# Patient Record
Sex: MALE | Race: WHITE | NOT HISPANIC OR LATINO | Employment: OTHER | URBAN - METROPOLITAN AREA
[De-identification: names, ages, dates, MRNs, and addresses within clinical notes are randomized per-mention and may not be internally consistent; named-entity substitution may affect disease eponyms.]

---

## 2018-02-13 ENCOUNTER — ANESTHESIA EVENT (OUTPATIENT)
Dept: SURGERY | Facility: CLINIC | Age: 68
DRG: 219 | End: 2018-02-13
Payer: MEDICARE

## 2018-02-13 ENCOUNTER — APPOINTMENT (OUTPATIENT)
Dept: CT IMAGING | Facility: CLINIC | Age: 68
DRG: 219 | End: 2018-02-13
Attending: EMERGENCY MEDICINE
Payer: MEDICARE

## 2018-02-13 ENCOUNTER — HOSPITAL ENCOUNTER (INPATIENT)
Facility: CLINIC | Age: 68
LOS: 6 days | Discharge: HOME | DRG: 219 | End: 2018-02-19
Attending: EMERGENCY MEDICINE | Admitting: INTERNAL MEDICINE
Payer: MEDICARE

## 2018-02-13 ENCOUNTER — APPOINTMENT (OUTPATIENT)
Dept: CARDIOLOGY | Facility: CLINIC | Age: 68
DRG: 219 | End: 2018-02-13
Attending: INTERNAL MEDICINE
Payer: MEDICARE

## 2018-02-13 ENCOUNTER — APPOINTMENT (OUTPATIENT)
Dept: GENERAL RADIOLOGY | Facility: CLINIC | Age: 68
DRG: 219 | End: 2018-02-13
Attending: PHYSICIAN ASSISTANT
Payer: MEDICARE

## 2018-02-13 ENCOUNTER — ANESTHESIA (OUTPATIENT)
Dept: SURGERY | Facility: CLINIC | Age: 68
DRG: 219 | End: 2018-02-13
Payer: MEDICARE

## 2018-02-13 DIAGNOSIS — I71.21 THORACIC ASCENDING AORTIC ANEURYSM (H): ICD-10-CM

## 2018-02-13 DIAGNOSIS — I71.019 DISSECTION OF THORACIC AORTA (H): Primary | ICD-10-CM

## 2018-02-13 DIAGNOSIS — Z98.890 S/P AORTIC DISSECTION REPAIR: ICD-10-CM

## 2018-02-13 DIAGNOSIS — R57.0 CARDIOGENIC SHOCK (H): ICD-10-CM

## 2018-02-13 DIAGNOSIS — I31.39 PERICARDIAL EFFUSION: ICD-10-CM

## 2018-02-13 LAB
ABO + RH BLD: NORMAL
ABO + RH BLD: NORMAL
ALBUMIN SERPL-MCNC: 4 G/DL (ref 3.4–5)
ALP SERPL-CCNC: 70 U/L (ref 40–150)
ALT SERPL W P-5'-P-CCNC: 47 U/L (ref 0–70)
ANION GAP SERPL CALCULATED.3IONS-SCNC: 12 MMOL/L (ref 3–14)
ANION GAP SERPL CALCULATED.3IONS-SCNC: 13 MMOL/L (ref 3–14)
ANION GAP SERPL CALCULATED.3IONS-SCNC: 15 MMOL/L (ref 3–14)
ANION GAP SERPL CALCULATED.3IONS-SCNC: 9 MMOL/L (ref 3–14)
APTT PPP: 27 SEC (ref 22–37)
APTT PPP: 60 SEC (ref 22–37)
APTT PPP: 67 SEC (ref 22–37)
APTT PPP: 86 SEC (ref 22–37)
AST SERPL W P-5'-P-CCNC: 35 U/L (ref 0–45)
BASE DEFICIT BLDA-SCNC: 3.6 MMOL/L
BASOPHILS # BLD AUTO: 0 10E9/L (ref 0–0.2)
BASOPHILS # BLD AUTO: 0 10E9/L (ref 0–0.2)
BASOPHILS NFR BLD AUTO: 0.1 %
BASOPHILS NFR BLD AUTO: 0.3 %
BILIRUB SERPL-MCNC: 0.8 MG/DL (ref 0.2–1.3)
BLD GP AB SCN SERPL QL: NORMAL
BLD PROD TYP BPU: NORMAL
BLD UNIT ID BPU: 0
BLOOD BANK CMNT PATIENT-IMP: NORMAL
BLOOD PRODUCT CODE: NORMAL
BPU ID: NORMAL
BUN SERPL-MCNC: 20 MG/DL (ref 7–30)
BUN SERPL-MCNC: 20 MG/DL (ref 7–30)
BUN SERPL-MCNC: 21 MG/DL (ref 7–30)
BUN SERPL-MCNC: 21 MG/DL (ref 7–30)
CA-I BLD-SCNC: 2.4 MG/DL (ref 4.4–5.2)
CA-I BLD-SCNC: 4.7 MG/DL (ref 4.4–5.2)
CALCIUM SERPL-MCNC: 7.2 MG/DL (ref 8.5–10.1)
CALCIUM SERPL-MCNC: 7.2 MG/DL (ref 8.5–10.1)
CALCIUM SERPL-MCNC: 7.6 MG/DL (ref 8.5–10.1)
CALCIUM SERPL-MCNC: 8.5 MG/DL (ref 8.5–10.1)
CHLORIDE SERPL-SCNC: 106 MMOL/L (ref 94–109)
CHLORIDE SERPL-SCNC: 110 MMOL/L (ref 94–109)
CHLORIDE SERPL-SCNC: 112 MMOL/L (ref 94–109)
CHLORIDE SERPL-SCNC: 112 MMOL/L (ref 94–109)
CO2 BLD-SCNC: 20 MMOL/L (ref 21–28)
CO2 BLD-SCNC: 20 MMOL/L (ref 21–28)
CO2 BLD-SCNC: 21 MMOL/L (ref 21–28)
CO2 BLD-SCNC: 22 MMOL/L (ref 21–28)
CO2 BLD-SCNC: 22 MMOL/L (ref 21–28)
CO2 BLD-SCNC: 23 MMOL/L (ref 21–28)
CO2 BLD-SCNC: 25 MMOL/L (ref 21–28)
CO2 BLDCOV-SCNC: 23 MMOL/L (ref 21–28)
CO2 SERPL-SCNC: 20 MMOL/L (ref 20–32)
CO2 SERPL-SCNC: 21 MMOL/L (ref 20–32)
CO2 SERPL-SCNC: 21 MMOL/L (ref 20–32)
CO2 SERPL-SCNC: 26 MMOL/L (ref 20–32)
CPB APPLIED: ABNORMAL
CREAT SERPL-MCNC: 1.35 MG/DL (ref 0.66–1.25)
CREAT SERPL-MCNC: 1.39 MG/DL (ref 0.66–1.25)
CREAT SERPL-MCNC: 1.4 MG/DL (ref 0.66–1.25)
CREAT SERPL-MCNC: 1.47 MG/DL (ref 0.66–1.25)
D DIMER PPP FEU-MCNC: 0.4 UG/ML FEU (ref 0–0.5)
DIFFERENTIAL METHOD BLD: ABNORMAL
DIFFERENTIAL METHOD BLD: ABNORMAL
EOSINOPHIL # BLD AUTO: 0 10E9/L (ref 0–0.7)
EOSINOPHIL # BLD AUTO: 0.1 10E9/L (ref 0–0.7)
EOSINOPHIL NFR BLD AUTO: 0.2 %
EOSINOPHIL NFR BLD AUTO: 0.8 %
ERYTHROCYTE [DISTWIDTH] IN BLOOD BY AUTOMATED COUNT: 13.9 % (ref 10–15)
ERYTHROCYTE [DISTWIDTH] IN BLOOD BY AUTOMATED COUNT: 14 % (ref 10–15)
ERYTHROCYTE [DISTWIDTH] IN BLOOD BY AUTOMATED COUNT: 14.2 % (ref 10–15)
ERYTHROCYTE [DISTWIDTH] IN BLOOD BY AUTOMATED COUNT: 14.2 % (ref 10–15)
FIBRINOGEN PPP-MCNC: 185 MG/DL (ref 200–420)
FIBRINOGEN PPP-MCNC: 211 MG/DL (ref 200–420)
GFR SERPL CREATININE-BSD FRML MDRD: 48 ML/MIN/1.7M2
GFR SERPL CREATININE-BSD FRML MDRD: 50 ML/MIN/1.7M2
GFR SERPL CREATININE-BSD FRML MDRD: 51 ML/MIN/1.7M2
GFR SERPL CREATININE-BSD FRML MDRD: 53 ML/MIN/1.7M2
GLUCOSE BLDC GLUCOMTR-MCNC: 122 MG/DL (ref 70–99)
GLUCOSE BLDC GLUCOMTR-MCNC: 167 MG/DL (ref 70–99)
GLUCOSE BLDC GLUCOMTR-MCNC: 174 MG/DL (ref 70–99)
GLUCOSE SERPL-MCNC: 140 MG/DL (ref 70–99)
GLUCOSE SERPL-MCNC: 178 MG/DL (ref 70–99)
GLUCOSE SERPL-MCNC: 185 MG/DL (ref 70–99)
GLUCOSE SERPL-MCNC: 209 MG/DL (ref 70–99)
HBA1C MFR BLD: 5.5 % (ref 4.3–6)
HCO3 BLD-SCNC: 21 MMOL/L (ref 21–28)
HCO3 BLDV-SCNC: 23 MMOL/L (ref 21–28)
HCT VFR BLD AUTO: 27.1 % (ref 40–53)
HCT VFR BLD AUTO: 29.8 % (ref 40–53)
HCT VFR BLD AUTO: 31.7 % (ref 40–53)
HCT VFR BLD AUTO: 43.3 % (ref 40–53)
HCT VFR BLD CALC: 22 %PCV (ref 40–53)
HCT VFR BLD CALC: 29 %PCV (ref 40–53)
HCT VFR BLD CALC: 29 %PCV (ref 40–53)
HCT VFR BLD CALC: 32 %PCV (ref 40–53)
HCT VFR BLD CALC: 33 %PCV (ref 40–53)
HCT VFR BLD CALC: 33 %PCV (ref 40–53)
HCT VFR BLD CALC: 34 %PCV (ref 40–53)
HCT VFR BLD CALC: 36 %PCV (ref 40–53)
HCT VFR BLD CALC: 42 %PCV (ref 40–53)
HGB BLD CALC-MCNC: 10.9 G/DL (ref 13.3–17.7)
HGB BLD CALC-MCNC: 11.2 G/DL (ref 13.3–17.7)
HGB BLD CALC-MCNC: 11.2 G/DL (ref 13.3–17.7)
HGB BLD CALC-MCNC: 11.6 G/DL (ref 13.3–17.7)
HGB BLD CALC-MCNC: 12.2 G/DL (ref 13.3–17.7)
HGB BLD CALC-MCNC: 14.3 G/DL (ref 13.3–17.7)
HGB BLD CALC-MCNC: 7.5 G/DL (ref 13.3–17.7)
HGB BLD CALC-MCNC: 9.9 G/DL (ref 13.3–17.7)
HGB BLD CALC-MCNC: 9.9 G/DL (ref 13.3–17.7)
HGB BLD-MCNC: 10.1 G/DL (ref 13.3–17.7)
HGB BLD-MCNC: 10.7 G/DL (ref 13.3–17.7)
HGB BLD-MCNC: 14.6 G/DL (ref 13.3–17.7)
HGB BLD-MCNC: 9 G/DL (ref 13.3–17.7)
IMM GRANULOCYTES # BLD: 0.1 10E9/L (ref 0–0.4)
IMM GRANULOCYTES # BLD: 0.2 10E9/L (ref 0–0.4)
IMM GRANULOCYTES NFR BLD: 0.5 %
IMM GRANULOCYTES NFR BLD: 1.2 %
INR PPP: 0.82 (ref 0.86–1.14)
INR PPP: 0.86 (ref 0.86–1.14)
INR PPP: 1.04 (ref 0.86–1.14)
INR PPP: 1.65 (ref 0.86–1.14)
INTERPRETATION ECG - MUSE: NORMAL
INTERPRETATION ECG - MUSE: NORMAL
LACTATE BLD-SCNC: 1.3 MMOL/L (ref 0.7–2.1)
LACTATE BLD-SCNC: 1.5 MMOL/L (ref 0.7–2.1)
LACTATE BLD-SCNC: 1.6 MMOL/L (ref 0.7–2.1)
LACTATE BLD-SCNC: 1.6 MMOL/L (ref 0.7–2.1)
LACTATE BLD-SCNC: 1.7 MMOL/L (ref 0.7–2.1)
LACTATE BLD-SCNC: 2 MMOL/L (ref 0.7–2.1)
LACTATE BLD-SCNC: 2.3 MMOL/L (ref 0.7–2)
LACTATE BLD-SCNC: 2.6 MMOL/L (ref 0.7–2)
LACTATE BLD-SCNC: 4.1 MMOL/L (ref 0.7–2)
LACTATE BLD-SCNC: NORMAL MMOL/L (ref 0.7–2)
LYMPHOCYTES # BLD AUTO: 1.3 10E9/L (ref 0.8–5.3)
LYMPHOCYTES # BLD AUTO: 1.4 10E9/L (ref 0.8–5.3)
LYMPHOCYTES NFR BLD AUTO: 11.2 %
LYMPHOCYTES NFR BLD AUTO: 7.8 %
MCH RBC QN AUTO: 30.4 PG (ref 26.5–33)
MCH RBC QN AUTO: 30.8 PG (ref 26.5–33)
MCH RBC QN AUTO: 30.9 PG (ref 26.5–33)
MCH RBC QN AUTO: 31 PG (ref 26.5–33)
MCHC RBC AUTO-ENTMCNC: 33.2 G/DL (ref 31.5–36.5)
MCHC RBC AUTO-ENTMCNC: 33.7 G/DL (ref 31.5–36.5)
MCHC RBC AUTO-ENTMCNC: 33.8 G/DL (ref 31.5–36.5)
MCHC RBC AUTO-ENTMCNC: 33.9 G/DL (ref 31.5–36.5)
MCV RBC AUTO: 91 FL (ref 78–100)
MCV RBC AUTO: 92 FL (ref 78–100)
MONOCYTES # BLD AUTO: 0.6 10E9/L (ref 0–1.3)
MONOCYTES # BLD AUTO: 1.2 10E9/L (ref 0–1.3)
MONOCYTES NFR BLD AUTO: 4.8 %
MONOCYTES NFR BLD AUTO: 6.9 %
NEUTROPHILS # BLD AUTO: 14.9 10E9/L (ref 1.6–8.3)
NEUTROPHILS # BLD AUTO: 9.8 10E9/L (ref 1.6–8.3)
NEUTROPHILS NFR BLD AUTO: 82.4 %
NEUTROPHILS NFR BLD AUTO: 83.8 %
NRBC # BLD AUTO: 0 10*3/UL
NRBC # BLD AUTO: 0 10*3/UL
NRBC BLD AUTO-RTO: 0 /100
NRBC BLD AUTO-RTO: 0 /100
NT-PROBNP SERPL-MCNC: 350 PG/ML (ref 0–900)
NUM BPU REQUESTED: 6
OXYHGB MFR BLD: 94 % (ref 92–100)
OXYHGB MFR BLDV: 66 %
PCO2 BLD: 36 MM HG (ref 35–45)
PCO2 BLD: 40 MM HG (ref 35–45)
PCO2 BLD: 42 MM HG (ref 35–45)
PCO2 BLD: 43 MM HG (ref 35–45)
PCO2 BLD: 44 MM HG (ref 35–45)
PCO2 BLD: 46 MM HG (ref 35–45)
PCO2 BLD: 47 MM HG (ref 35–45)
PCO2 BLD: 51 MM HG (ref 35–45)
PCO2 BLDV: 40 MM HG (ref 40–50)
PCO2 BLDV: 48 MM HG (ref 40–50)
PH BLD: 7.28 PH (ref 7.35–7.45)
PH BLD: 7.28 PH (ref 7.35–7.45)
PH BLD: 7.29 PH (ref 7.35–7.45)
PH BLD: 7.29 PH (ref 7.35–7.45)
PH BLD: 7.3 PH (ref 7.35–7.45)
PH BLD: 7.33 PH (ref 7.35–7.45)
PH BLD: 7.33 PH (ref 7.35–7.45)
PH BLD: 7.38 PH (ref 7.35–7.45)
PH BLDV: 7.29 PH (ref 7.32–7.43)
PH BLDV: 7.37 PH (ref 7.32–7.43)
PLATELET # BLD AUTO: 118 10E9/L (ref 150–450)
PLATELET # BLD AUTO: 151 10E9/L (ref 150–450)
PLATELET # BLD AUTO: 67 10E9/L (ref 150–450)
PLATELET # BLD AUTO: 97 10E9/L (ref 150–450)
PO2 BLD: 185 MM HG (ref 80–105)
PO2 BLD: 199 MM HG (ref 80–105)
PO2 BLD: 239 MM HG (ref 80–105)
PO2 BLD: 315 MM HG (ref 80–105)
PO2 BLD: 329 MM HG (ref 80–105)
PO2 BLD: 342 MM HG (ref 80–105)
PO2 BLD: 520 MM HG (ref 80–105)
PO2 BLD: 74 MM HG (ref 80–105)
PO2 BLDV: 34 MM HG (ref 25–47)
PO2 BLDV: 49 MM HG (ref 25–47)
POTASSIUM BLD-SCNC: 3.2 MMOL/L (ref 3.4–5.3)
POTASSIUM BLD-SCNC: 4 MMOL/L (ref 3.4–5.3)
POTASSIUM BLD-SCNC: 4.3 MMOL/L (ref 3.4–5.3)
POTASSIUM BLD-SCNC: 4.3 MMOL/L (ref 3.4–5.3)
POTASSIUM BLD-SCNC: 4.5 MMOL/L (ref 3.4–5.3)
POTASSIUM BLD-SCNC: 6.1 MMOL/L (ref 3.4–5.3)
POTASSIUM BLD-SCNC: 6.1 MMOL/L (ref 3.4–5.3)
POTASSIUM BLD-SCNC: 6.2 MMOL/L (ref 3.4–5.3)
POTASSIUM BLD-SCNC: 7 MMOL/L (ref 3.4–5.3)
POTASSIUM SERPL-SCNC: 3.3 MMOL/L (ref 3.4–5.3)
POTASSIUM SERPL-SCNC: 4 MMOL/L (ref 3.4–5.3)
POTASSIUM SERPL-SCNC: 4 MMOL/L (ref 3.4–5.3)
POTASSIUM SERPL-SCNC: 4.3 MMOL/L (ref 3.4–5.3)
PROT SERPL-MCNC: 7.2 G/DL (ref 6.8–8.8)
RBC # BLD AUTO: 2.96 10E12/L (ref 4.4–5.9)
RBC # BLD AUTO: 3.28 10E12/L (ref 4.4–5.9)
RBC # BLD AUTO: 3.46 10E12/L (ref 4.4–5.9)
RBC # BLD AUTO: 4.71 10E12/L (ref 4.4–5.9)
SAO2 % BLDA FROM PO2: 100 % (ref 92–100)
SAO2 % BLDA FROM PO2: 99 % (ref 92–100)
SAO2 % BLDV FROM PO2: 80 %
SODIUM BLD-SCNC: 139 MMOL/L (ref 133–144)
SODIUM BLD-SCNC: 141 MMOL/L (ref 133–144)
SODIUM BLD-SCNC: 141 MMOL/L (ref 133–144)
SODIUM BLD-SCNC: 142 MMOL/L (ref 133–144)
SODIUM BLD-SCNC: 144 MMOL/L (ref 133–144)
SODIUM BLD-SCNC: 144 MMOL/L (ref 133–144)
SODIUM SERPL-SCNC: 141 MMOL/L (ref 133–144)
SODIUM SERPL-SCNC: 144 MMOL/L (ref 133–144)
SODIUM SERPL-SCNC: 145 MMOL/L (ref 133–144)
SODIUM SERPL-SCNC: 147 MMOL/L (ref 133–144)
SPECIMEN EXP DATE BLD: NORMAL
TRANSFUSION STATUS PATIENT QL: NORMAL
TROPONIN I BLD-MCNC: 0 UG/L (ref 0–0.1)
TROPONIN I SERPL-MCNC: 0.02 UG/L (ref 0–0.04)
TROPONIN I SERPL-MCNC: <0.015 UG/L (ref 0–0.04)
WBC # BLD AUTO: 11.9 10E9/L (ref 4–11)
WBC # BLD AUTO: 17.8 10E9/L (ref 4–11)
WBC # BLD AUTO: 18.9 10E9/L (ref 4–11)
WBC # BLD AUTO: 8.6 10E9/L (ref 4–11)

## 2018-02-13 PROCEDURE — 86923 COMPATIBILITY TEST ELECTRIC: CPT | Performed by: EMERGENCY MEDICINE

## 2018-02-13 PROCEDURE — 86850 RBC ANTIBODY SCREEN: CPT | Performed by: EMERGENCY MEDICINE

## 2018-02-13 PROCEDURE — 82803 BLOOD GASES ANY COMBINATION: CPT

## 2018-02-13 PROCEDURE — 25000128 H RX IP 250 OP 636: Performed by: PHYSICIAN ASSISTANT

## 2018-02-13 PROCEDURE — 82805 BLOOD GASES W/O2 SATURATION: CPT | Performed by: SURGERY

## 2018-02-13 PROCEDURE — 86901 BLOOD TYPING SEROLOGIC RH(D): CPT | Performed by: EMERGENCY MEDICINE

## 2018-02-13 PROCEDURE — C1781 MESH (IMPLANTABLE): HCPCS | Performed by: SURGERY

## 2018-02-13 PROCEDURE — 25000128 H RX IP 250 OP 636: Performed by: EMERGENCY MEDICINE

## 2018-02-13 PROCEDURE — 25000128 H RX IP 250 OP 636: Performed by: NURSE ANESTHETIST, CERTIFIED REGISTERED

## 2018-02-13 PROCEDURE — 86900 BLOOD TYPING SEROLOGIC ABO: CPT | Performed by: EMERGENCY MEDICINE

## 2018-02-13 PROCEDURE — P9059 PLASMA, FRZ BETWEEN 8-24HOUR: HCPCS | Performed by: THORACIC SURGERY (CARDIOTHORACIC VASCULAR SURGERY)

## 2018-02-13 PROCEDURE — 70498 CT ANGIOGRAPHY NECK: CPT

## 2018-02-13 PROCEDURE — 80053 COMPREHEN METABOLIC PANEL: CPT | Performed by: EMERGENCY MEDICINE

## 2018-02-13 PROCEDURE — 96365 THER/PROPH/DIAG IV INF INIT: CPT

## 2018-02-13 PROCEDURE — 25000125 ZZHC RX 250: Performed by: SURGERY

## 2018-02-13 PROCEDURE — 25000125 ZZHC RX 250

## 2018-02-13 PROCEDURE — 80048 BASIC METABOLIC PNL TOTAL CA: CPT | Performed by: INTERNAL MEDICINE

## 2018-02-13 PROCEDURE — 20000003 ZZH R&B ICU

## 2018-02-13 PROCEDURE — 27210995 ZZH RX 272

## 2018-02-13 PROCEDURE — 25000128 H RX IP 250 OP 636: Performed by: INTERNAL MEDICINE

## 2018-02-13 PROCEDURE — 83605 ASSAY OF LACTIC ACID: CPT | Performed by: EMERGENCY MEDICINE

## 2018-02-13 PROCEDURE — 25800025 ZZH RX 258: Performed by: PHYSICIAN ASSISTANT

## 2018-02-13 PROCEDURE — 37000008 ZZH ANESTHESIA TECHNICAL FEE, 1ST 30 MIN: Performed by: SURGERY

## 2018-02-13 PROCEDURE — 84484 ASSAY OF TROPONIN QUANT: CPT | Performed by: EMERGENCY MEDICINE

## 2018-02-13 PROCEDURE — 85027 COMPLETE CBC AUTOMATED: CPT | Performed by: INTERNAL MEDICINE

## 2018-02-13 PROCEDURE — 27211024 ZZHC OR SUPPLY OTHER OPNP: Performed by: SURGERY

## 2018-02-13 PROCEDURE — 83605 ASSAY OF LACTIC ACID: CPT

## 2018-02-13 PROCEDURE — 36000075 ZZH SURGERY LEVEL 6 EA 15 ADDTL MIN: Performed by: SURGERY

## 2018-02-13 PROCEDURE — 96375 TX/PRO/DX INJ NEW DRUG ADDON: CPT

## 2018-02-13 PROCEDURE — 25000566 ZZH SEVOFLURANE, EA 15 MIN: Performed by: SURGERY

## 2018-02-13 PROCEDURE — 70450 CT HEAD/BRAIN W/O DYE: CPT

## 2018-02-13 PROCEDURE — 85027 COMPLETE CBC AUTOMATED: CPT | Performed by: SURGERY

## 2018-02-13 PROCEDURE — 93320 DOPPLER ECHO COMPLETE: CPT

## 2018-02-13 PROCEDURE — 02HQ32Z INSERTION OF MONITORING DEVICE INTO RIGHT PULMONARY ARTERY, PERCUTANEOUS APPROACH: ICD-10-PCS | Performed by: SURGERY

## 2018-02-13 PROCEDURE — 40000344 ZZHCL STATISTIC THAWING COMPONENT: Performed by: INTERNAL MEDICINE

## 2018-02-13 PROCEDURE — 37000009 ZZH ANESTHESIA TECHNICAL FEE, EACH ADDTL 15 MIN: Performed by: SURGERY

## 2018-02-13 PROCEDURE — 02RX0JZ REPLACEMENT OF THORACIC AORTA, ASCENDING/ARCH WITH SYNTHETIC SUBSTITUTE, OPEN APPROACH: ICD-10-PCS | Performed by: SURGERY

## 2018-02-13 PROCEDURE — 40000501 ZZHCL STATISTIC HEMATOCRIT ED POCT

## 2018-02-13 PROCEDURE — 4A133B3 MONITORING OF ARTERIAL PRESSURE, PULMONARY, PERCUTANEOUS APPROACH: ICD-10-PCS | Performed by: SURGERY

## 2018-02-13 PROCEDURE — 5A1221Z PERFORMANCE OF CARDIAC OUTPUT, CONTINUOUS: ICD-10-PCS | Performed by: SURGERY

## 2018-02-13 PROCEDURE — 85014 HEMATOCRIT: CPT

## 2018-02-13 PROCEDURE — 83605 ASSAY OF LACTIC ACID: CPT | Performed by: SURGERY

## 2018-02-13 PROCEDURE — 83036 HEMOGLOBIN GLYCOSYLATED A1C: CPT | Performed by: SURGERY

## 2018-02-13 PROCEDURE — 99285 EMERGENCY DEPT VISIT HI MDM: CPT | Mod: 25

## 2018-02-13 PROCEDURE — 40000498 ZZHCL STATISTIC POTASSIUM ED POCT

## 2018-02-13 PROCEDURE — P9037 PLATE PHERES LEUKOREDU IRRAD: HCPCS | Performed by: INTERNAL MEDICINE

## 2018-02-13 PROCEDURE — 25000555 ZZHC RX FACTOR IP 250 OP 636: Performed by: SURGERY

## 2018-02-13 PROCEDURE — 84132 ASSAY OF SERUM POTASSIUM: CPT

## 2018-02-13 PROCEDURE — 25000301 ZZH OR RX SURGIFLO W/THROMBIN KIT 2ML 1991 OPNP: Performed by: SURGERY

## 2018-02-13 PROCEDURE — 84295 ASSAY OF SERUM SODIUM: CPT

## 2018-02-13 PROCEDURE — P9016 RBC LEUKOCYTES REDUCED: HCPCS | Performed by: EMERGENCY MEDICINE

## 2018-02-13 PROCEDURE — 25000125 ZZHC RX 250: Performed by: NURSE ANESTHETIST, CERTIFIED REGISTERED

## 2018-02-13 PROCEDURE — 93010 ELECTROCARDIOGRAM REPORT: CPT | Performed by: INTERNAL MEDICINE

## 2018-02-13 PROCEDURE — 40000275 ZZH STATISTIC RCP TIME EA 10 MIN

## 2018-02-13 PROCEDURE — 27210995 ZZH RX 272: Performed by: SURGERY

## 2018-02-13 PROCEDURE — 25000128 H RX IP 250 OP 636: Performed by: SURGERY

## 2018-02-13 PROCEDURE — 25000125 ZZHC RX 250: Performed by: EMERGENCY MEDICINE

## 2018-02-13 PROCEDURE — 40000497 ZZHCL STATISTIC SODIUM ED POCT

## 2018-02-13 PROCEDURE — 25000128 H RX IP 250 OP 636

## 2018-02-13 PROCEDURE — 41000022 ZZH PER-PERFUSION, SH ONLY,  1ST 30 MIN: Performed by: SURGERY

## 2018-02-13 PROCEDURE — 85610 PROTHROMBIN TIME: CPT | Performed by: SURGERY

## 2018-02-13 PROCEDURE — 85379 FIBRIN DEGRADATION QUANT: CPT | Performed by: EMERGENCY MEDICINE

## 2018-02-13 PROCEDURE — P9012 CRYOPRECIPITATE EACH UNIT: HCPCS | Performed by: INTERNAL MEDICINE

## 2018-02-13 PROCEDURE — 82330 ASSAY OF CALCIUM: CPT

## 2018-02-13 PROCEDURE — 40000344 ZZHCL STATISTIC THAWING COMPONENT: Performed by: THORACIC SURGERY (CARDIOTHORACIC VASCULAR SURGERY)

## 2018-02-13 PROCEDURE — 93005 ELECTROCARDIOGRAM TRACING: CPT

## 2018-02-13 PROCEDURE — 88304 TISSUE EXAM BY PATHOLOGIST: CPT | Performed by: SURGERY

## 2018-02-13 PROCEDURE — 96367 TX/PROPH/DG ADDL SEQ IV INF: CPT

## 2018-02-13 PROCEDURE — 88304 TISSUE EXAM BY PATHOLOGIST: CPT | Mod: 26 | Performed by: SURGERY

## 2018-02-13 PROCEDURE — 93320 DOPPLER ECHO COMPLETE: CPT | Mod: 26 | Performed by: INTERNAL MEDICINE

## 2018-02-13 PROCEDURE — 4A1239Z MONITORING OF CARDIAC OUTPUT, PERCUTANEOUS APPROACH: ICD-10-PCS | Performed by: SURGERY

## 2018-02-13 PROCEDURE — 85610 PROTHROMBIN TIME: CPT | Performed by: EMERGENCY MEDICINE

## 2018-02-13 PROCEDURE — C1768 GRAFT, VASCULAR: HCPCS | Performed by: SURGERY

## 2018-02-13 PROCEDURE — 85730 THROMBOPLASTIN TIME PARTIAL: CPT | Performed by: EMERGENCY MEDICINE

## 2018-02-13 PROCEDURE — 93005 ELECTROCARDIOGRAM TRACING: CPT | Mod: 76

## 2018-02-13 PROCEDURE — 36415 COLL VENOUS BLD VENIPUNCTURE: CPT

## 2018-02-13 PROCEDURE — 40000986 XR CHEST PORT 1 VW

## 2018-02-13 PROCEDURE — 74177 CT ABD & PELVIS W/CONTRAST: CPT

## 2018-02-13 PROCEDURE — 87040 BLOOD CULTURE FOR BACTERIA: CPT | Performed by: EMERGENCY MEDICINE

## 2018-02-13 PROCEDURE — 40000857 ZZH STATISTIC TEE INCLUDES SEDATION

## 2018-02-13 PROCEDURE — 96366 THER/PROPH/DIAG IV INF ADDON: CPT

## 2018-02-13 PROCEDURE — 85025 COMPLETE CBC W/AUTO DIFF WBC: CPT | Performed by: EMERGENCY MEDICINE

## 2018-02-13 PROCEDURE — 80048 BASIC METABOLIC PNL TOTAL CA: CPT | Performed by: SURGERY

## 2018-02-13 PROCEDURE — P9041 ALBUMIN (HUMAN),5%, 50ML: HCPCS

## 2018-02-13 PROCEDURE — 85025 COMPLETE CBC W/AUTO DIFF WBC: CPT | Performed by: INTERNAL MEDICINE

## 2018-02-13 PROCEDURE — 85384 FIBRINOGEN ACTIVITY: CPT | Performed by: INTERNAL MEDICINE

## 2018-02-13 PROCEDURE — 36000073 ZZH SURGERY LEVEL 6 1ST 30 MIN: Performed by: SURGERY

## 2018-02-13 PROCEDURE — 25000125 ZZHC RX 250: Performed by: INTERNAL MEDICINE

## 2018-02-13 PROCEDURE — 84484 ASSAY OF TROPONIN QUANT: CPT

## 2018-02-13 PROCEDURE — 85730 THROMBOPLASTIN TIME PARTIAL: CPT | Performed by: INTERNAL MEDICINE

## 2018-02-13 PROCEDURE — 25000131 ZZH RX MED GY IP 250 OP 636 PS 637: Mod: GY | Performed by: PHYSICIAN ASSISTANT

## 2018-02-13 PROCEDURE — 93312 ECHO TRANSESOPHAGEAL: CPT | Mod: 26 | Performed by: INTERNAL MEDICINE

## 2018-02-13 PROCEDURE — 27210794 ZZH OR GENERAL SUPPLY STERILE: Performed by: SURGERY

## 2018-02-13 PROCEDURE — 41000023 ZZH PERA-PERFUSION, SH ONLY,  EACH ADDTL 15 MIN: Performed by: SURGERY

## 2018-02-13 PROCEDURE — 83880 ASSAY OF NATRIURETIC PEPTIDE: CPT | Performed by: EMERGENCY MEDICINE

## 2018-02-13 PROCEDURE — 85730 THROMBOPLASTIN TIME PARTIAL: CPT | Performed by: SURGERY

## 2018-02-13 PROCEDURE — 85610 PROTHROMBIN TIME: CPT | Performed by: INTERNAL MEDICINE

## 2018-02-13 PROCEDURE — 40000008 ZZH STATISTIC AIRWAY CARE

## 2018-02-13 PROCEDURE — 99291 CRITICAL CARE FIRST HOUR: CPT | Performed by: INTERNAL MEDICINE

## 2018-02-13 PROCEDURE — 94002 VENT MGMT INPAT INIT DAY: CPT

## 2018-02-13 PROCEDURE — 25000125 ZZHC RX 250: Performed by: PHYSICIAN ASSISTANT

## 2018-02-13 PROCEDURE — 93325 DOPPLER ECHO COLOR FLOW MAPG: CPT | Mod: 26 | Performed by: INTERNAL MEDICINE

## 2018-02-13 PROCEDURE — 00000146 ZZHCL STATISTIC GLUCOSE BY METER IP

## 2018-02-13 DEVICE — GRAFT PTFE FELT 6X6" 007837: Type: IMPLANTABLE DEVICE | Site: HEART | Status: FUNCTIONAL

## 2018-02-13 DEVICE — IMPLANTABLE DEVICE: Type: IMPLANTABLE DEVICE | Site: HEART | Status: FUNCTIONAL

## 2018-02-13 RX ORDER — SODIUM CHLORIDE, SODIUM LACTATE, POTASSIUM CHLORIDE, CALCIUM CHLORIDE 600; 310; 30; 20 MG/100ML; MG/100ML; MG/100ML; MG/100ML
INJECTION, SOLUTION INTRAVENOUS CONTINUOUS PRN
Status: DISCONTINUED | OUTPATIENT
Start: 2018-02-13 | End: 2018-02-13

## 2018-02-13 RX ORDER — LIDOCAINE 40 MG/G
CREAM TOPICAL
Status: DISCONTINUED | OUTPATIENT
Start: 2018-02-13 | End: 2018-02-13

## 2018-02-13 RX ORDER — SODIUM CHLORIDE 9 MG/ML
INJECTION, SOLUTION INTRAVENOUS CONTINUOUS PRN
Status: DISCONTINUED | OUTPATIENT
Start: 2018-02-13 | End: 2018-02-13

## 2018-02-13 RX ORDER — NOREPINEPHRINE BITARTRATE/D5W 16MG/250ML
0.03-0.4 PLASTIC BAG, INJECTION (ML) INTRAVENOUS CONTINUOUS
Status: DISCONTINUED | OUTPATIENT
Start: 2018-02-13 | End: 2018-02-13

## 2018-02-13 RX ORDER — FENTANYL CITRATE 50 UG/ML
25 INJECTION, SOLUTION INTRAMUSCULAR; INTRAVENOUS
Status: DISCONTINUED | OUTPATIENT
Start: 2018-02-13 | End: 2018-02-13

## 2018-02-13 RX ORDER — NICOTINE POLACRILEX 4 MG
15-30 LOZENGE BUCCAL
Status: DISCONTINUED | OUTPATIENT
Start: 2018-02-13 | End: 2018-02-19 | Stop reason: HOSPADM

## 2018-02-13 RX ORDER — FLUMAZENIL 0.1 MG/ML
0.2 INJECTION, SOLUTION INTRAVENOUS
Status: DISCONTINUED | OUTPATIENT
Start: 2018-02-13 | End: 2018-02-13

## 2018-02-13 RX ORDER — LIDOCAINE HYDROCHLORIDE 20 MG/ML
INJECTION, SOLUTION INFILTRATION; PERINEURAL PRN
Status: DISCONTINUED | OUTPATIENT
Start: 2018-02-13 | End: 2018-02-13

## 2018-02-13 RX ORDER — IOPAMIDOL 755 MG/ML
100 INJECTION, SOLUTION INTRAVASCULAR ONCE
Status: COMPLETED | OUTPATIENT
Start: 2018-02-13 | End: 2018-02-13

## 2018-02-13 RX ORDER — POTASSIUM CHLORIDE 1500 MG/1
20-40 TABLET, EXTENDED RELEASE ORAL
Status: DISCONTINUED | OUTPATIENT
Start: 2018-02-13 | End: 2018-02-19 | Stop reason: HOSPADM

## 2018-02-13 RX ORDER — HEPARIN SODIUM 1000 [USP'U]/ML
INJECTION, SOLUTION INTRAVENOUS; SUBCUTANEOUS PRN
Status: DISCONTINUED | OUTPATIENT
Start: 2018-02-13 | End: 2018-02-13

## 2018-02-13 RX ORDER — DEXTROSE MONOHYDRATE 25 G/50ML
25-50 INJECTION, SOLUTION INTRAVENOUS
Status: DISCONTINUED | OUTPATIENT
Start: 2018-02-13 | End: 2018-02-19 | Stop reason: HOSPADM

## 2018-02-13 RX ORDER — LIDOCAINE 40 MG/G
CREAM TOPICAL
Status: DISCONTINUED | OUTPATIENT
Start: 2018-02-13 | End: 2018-02-19 | Stop reason: HOSPADM

## 2018-02-13 RX ORDER — LIDOCAINE HYDROCHLORIDE 40 MG/ML
1.5 SOLUTION TOPICAL ONCE
Status: DISCONTINUED | OUTPATIENT
Start: 2018-02-13 | End: 2018-02-13

## 2018-02-13 RX ORDER — MAGNESIUM SULFATE HEPTAHYDRATE 40 MG/ML
4 INJECTION, SOLUTION INTRAVENOUS EVERY 4 HOURS PRN
Status: DISCONTINUED | OUTPATIENT
Start: 2018-02-13 | End: 2018-02-19 | Stop reason: HOSPADM

## 2018-02-13 RX ORDER — LIDOCAINE HYDROCHLORIDE AND EPINEPHRINE 10; 10 MG/ML; UG/ML
10 INJECTION, SOLUTION INFILTRATION; PERINEURAL ONCE
Status: DISCONTINUED | OUTPATIENT
Start: 2018-02-13 | End: 2018-02-13

## 2018-02-13 RX ORDER — CEFAZOLIN SODIUM 1 G
1 VIAL (EA) INJECTION SEE ADMIN INSTRUCTIONS
Status: DISCONTINUED | OUTPATIENT
Start: 2018-02-13 | End: 2018-02-13

## 2018-02-13 RX ORDER — NALOXONE HYDROCHLORIDE 0.4 MG/ML
.1-.4 INJECTION, SOLUTION INTRAMUSCULAR; INTRAVENOUS; SUBCUTANEOUS
Status: DISCONTINUED | OUTPATIENT
Start: 2018-02-13 | End: 2018-02-19 | Stop reason: HOSPADM

## 2018-02-13 RX ORDER — GLYCOPYRROLATE 0.2 MG/ML
0.1 INJECTION, SOLUTION INTRAMUSCULAR; INTRAVENOUS ONCE
Status: DISCONTINUED | OUTPATIENT
Start: 2018-02-13 | End: 2018-02-13

## 2018-02-13 RX ORDER — ALBUMIN, HUMAN INJ 5% 5 %
500-1000 SOLUTION INTRAVENOUS
Status: DISCONTINUED | OUTPATIENT
Start: 2018-02-13 | End: 2018-02-16

## 2018-02-13 RX ORDER — FENTANYL CITRATE 50 UG/ML
INJECTION, SOLUTION INTRAMUSCULAR; INTRAVENOUS PRN
Status: DISCONTINUED | OUTPATIENT
Start: 2018-02-13 | End: 2018-02-13

## 2018-02-13 RX ORDER — HYDRALAZINE HYDROCHLORIDE 20 MG/ML
10 INJECTION INTRAMUSCULAR; INTRAVENOUS EVERY 30 MIN PRN
Status: DISCONTINUED | OUTPATIENT
Start: 2018-02-13 | End: 2018-02-19 | Stop reason: HOSPADM

## 2018-02-13 RX ORDER — POTASSIUM CHLORIDE 1.5 G/1.58G
20-40 POWDER, FOR SOLUTION ORAL
Status: DISCONTINUED | OUTPATIENT
Start: 2018-02-13 | End: 2018-02-19 | Stop reason: HOSPADM

## 2018-02-13 RX ORDER — PROPOFOL 10 MG/ML
5-75 INJECTION, EMULSION INTRAVENOUS CONTINUOUS
Status: DISCONTINUED | OUTPATIENT
Start: 2018-02-13 | End: 2018-02-16

## 2018-02-13 RX ORDER — HEPARIN SODIUM 1000 [USP'U]/ML
INJECTION, SOLUTION INTRAVENOUS; SUBCUTANEOUS CONTINUOUS PRN
Status: DISCONTINUED | OUTPATIENT
Start: 2018-02-13 | End: 2018-02-13

## 2018-02-13 RX ORDER — AMOXICILLIN 250 MG
2 CAPSULE ORAL 2 TIMES DAILY PRN
Status: DISCONTINUED | OUTPATIENT
Start: 2018-02-13 | End: 2018-02-19 | Stop reason: HOSPADM

## 2018-02-13 RX ORDER — PROTAMINE SULFATE 10 MG/ML
INJECTION, SOLUTION INTRAVENOUS PRN
Status: DISCONTINUED | OUTPATIENT
Start: 2018-02-13 | End: 2018-02-13

## 2018-02-13 RX ORDER — OXYCODONE HYDROCHLORIDE 5 MG/1
5-10 TABLET ORAL EVERY 4 HOURS PRN
Status: DISCONTINUED | OUTPATIENT
Start: 2018-02-13 | End: 2018-02-19 | Stop reason: HOSPADM

## 2018-02-13 RX ORDER — METOPROLOL TARTRATE 25 MG/1
25 TABLET, FILM COATED ORAL
Status: DISCONTINUED | OUTPATIENT
Start: 2018-02-13 | End: 2018-02-13

## 2018-02-13 RX ORDER — POTASSIUM CL/LIDO/0.9 % NACL 10MEQ/0.1L
10 INTRAVENOUS SOLUTION, PIGGYBACK (ML) INTRAVENOUS
Status: DISCONTINUED | OUTPATIENT
Start: 2018-02-13 | End: 2018-02-19 | Stop reason: HOSPADM

## 2018-02-13 RX ORDER — ONDANSETRON 2 MG/ML
4 INJECTION INTRAMUSCULAR; INTRAVENOUS ONCE
Status: COMPLETED | OUTPATIENT
Start: 2018-02-13 | End: 2018-02-13

## 2018-02-13 RX ORDER — HYDROMORPHONE HYDROCHLORIDE 1 MG/ML
.3-.5 INJECTION, SOLUTION INTRAMUSCULAR; INTRAVENOUS; SUBCUTANEOUS
Status: DISCONTINUED | OUTPATIENT
Start: 2018-02-13 | End: 2018-02-16

## 2018-02-13 RX ORDER — MILRINONE LACTATE 0.2 MG/ML
0.25-0.75 INJECTION, SOLUTION INTRAVENOUS CONTINUOUS
Status: DISCONTINUED | OUTPATIENT
Start: 2018-02-13 | End: 2018-02-16

## 2018-02-13 RX ORDER — CEFAZOLIN SODIUM 2 G/100ML
2 INJECTION, SOLUTION INTRAVENOUS EVERY 8 HOURS
Status: COMPLETED | OUTPATIENT
Start: 2018-02-14 | End: 2018-02-14

## 2018-02-13 RX ORDER — DEXTROSE MONOHYDRATE, SODIUM CHLORIDE, AND POTASSIUM CHLORIDE 50; 1.49; 4.5 G/1000ML; G/1000ML; G/1000ML
INJECTION, SOLUTION INTRAVENOUS CONTINUOUS
Status: DISCONTINUED | OUTPATIENT
Start: 2018-02-13 | End: 2018-02-16

## 2018-02-13 RX ORDER — ONDANSETRON 2 MG/ML
4 INJECTION INTRAMUSCULAR; INTRAVENOUS EVERY 6 HOURS PRN
Status: DISCONTINUED | OUTPATIENT
Start: 2018-02-13 | End: 2018-02-19 | Stop reason: HOSPADM

## 2018-02-13 RX ORDER — ONDANSETRON 2 MG/ML
INJECTION INTRAMUSCULAR; INTRAVENOUS
Status: COMPLETED
Start: 2018-02-13 | End: 2018-02-13

## 2018-02-13 RX ORDER — SODIUM CHLORIDE 9 MG/ML
1000 INJECTION, SOLUTION INTRAVENOUS CONTINUOUS
Status: DISCONTINUED | OUTPATIENT
Start: 2018-02-13 | End: 2018-02-13

## 2018-02-13 RX ORDER — AMOXICILLIN 250 MG
1 CAPSULE ORAL 2 TIMES DAILY PRN
Status: DISCONTINUED | OUTPATIENT
Start: 2018-02-13 | End: 2018-02-19 | Stop reason: HOSPADM

## 2018-02-13 RX ORDER — NALOXONE HYDROCHLORIDE 0.4 MG/ML
.1-.4 INJECTION, SOLUTION INTRAMUSCULAR; INTRAVENOUS; SUBCUTANEOUS
Status: DISCONTINUED | OUTPATIENT
Start: 2018-02-13 | End: 2018-02-13

## 2018-02-13 RX ORDER — FENTANYL CITRATE 50 UG/ML
25-50 INJECTION, SOLUTION INTRAMUSCULAR; INTRAVENOUS
Status: DISCONTINUED | OUTPATIENT
Start: 2018-02-13 | End: 2018-02-13

## 2018-02-13 RX ORDER — SODIUM CHLORIDE 9 MG/ML
INJECTION, SOLUTION INTRAVENOUS ONCE
Status: COMPLETED | OUTPATIENT
Start: 2018-02-13 | End: 2018-02-13

## 2018-02-13 RX ORDER — POTASSIUM CHLORIDE 7.45 MG/ML
10 INJECTION INTRAVENOUS
Status: DISCONTINUED | OUTPATIENT
Start: 2018-02-13 | End: 2018-02-19 | Stop reason: HOSPADM

## 2018-02-13 RX ORDER — PROPOFOL 10 MG/ML
INJECTION, EMULSION INTRAVENOUS PRN
Status: DISCONTINUED | OUTPATIENT
Start: 2018-02-13 | End: 2018-02-13

## 2018-02-13 RX ORDER — CEFAZOLIN SODIUM 2 G/100ML
2 INJECTION, SOLUTION INTRAVENOUS
Status: DISCONTINUED | OUTPATIENT
Start: 2018-02-13 | End: 2018-02-13

## 2018-02-13 RX ORDER — ACETAMINOPHEN 325 MG/1
975 TABLET ORAL EVERY 8 HOURS
Status: DISPENSED | OUTPATIENT
Start: 2018-02-14 | End: 2018-02-16

## 2018-02-13 RX ORDER — ONDANSETRON 4 MG/1
4 TABLET, ORALLY DISINTEGRATING ORAL EVERY 6 HOURS PRN
Status: DISCONTINUED | OUTPATIENT
Start: 2018-02-13 | End: 2018-02-19 | Stop reason: HOSPADM

## 2018-02-13 RX ORDER — NITROGLYCERIN 20 MG/100ML
INJECTION INTRAVENOUS CONTINUOUS PRN
Status: DISCONTINUED | OUTPATIENT
Start: 2018-02-13 | End: 2018-02-13

## 2018-02-13 RX ORDER — PROPOFOL 10 MG/ML
INJECTION, EMULSION INTRAVENOUS CONTINUOUS PRN
Status: DISCONTINUED | OUTPATIENT
Start: 2018-02-13 | End: 2018-02-13

## 2018-02-13 RX ORDER — CEFAZOLIN SODIUM 1 G/3ML
INJECTION, POWDER, FOR SOLUTION INTRAMUSCULAR; INTRAVENOUS PRN
Status: DISCONTINUED | OUTPATIENT
Start: 2018-02-13 | End: 2018-02-13

## 2018-02-13 RX ORDER — MUPIROCIN 20 MG/G
OINTMENT TOPICAL 2 TIMES DAILY
Status: DISCONTINUED | OUTPATIENT
Start: 2018-02-13 | End: 2018-02-13

## 2018-02-13 RX ORDER — GLYCOPYRROLATE 0.2 MG/ML
0.1 INJECTION, SOLUTION INTRAMUSCULAR; INTRAVENOUS ONCE
Status: COMPLETED | OUTPATIENT
Start: 2018-02-13 | End: 2018-02-13

## 2018-02-13 RX ORDER — CALCIUM CHLORIDE 100 MG/ML
INJECTION INTRAVENOUS; INTRAVENTRICULAR PRN
Status: DISCONTINUED | OUTPATIENT
Start: 2018-02-13 | End: 2018-02-13

## 2018-02-13 RX ORDER — MUPIROCIN 20 MG/G
0.5 OINTMENT TOPICAL 2 TIMES DAILY
Status: DISPENSED | OUTPATIENT
Start: 2018-02-13 | End: 2018-02-18

## 2018-02-13 RX ORDER — FENTANYL CITRATE 50 UG/ML
25-50 INJECTION, SOLUTION INTRAMUSCULAR; INTRAVENOUS
Status: COMPLETED | OUTPATIENT
Start: 2018-02-13 | End: 2018-02-13

## 2018-02-13 RX ORDER — VECURONIUM BROMIDE 1 MG/ML
INJECTION, POWDER, LYOPHILIZED, FOR SOLUTION INTRAVENOUS PRN
Status: DISCONTINUED | OUTPATIENT
Start: 2018-02-13 | End: 2018-02-13

## 2018-02-13 RX ORDER — MEPERIDINE HYDROCHLORIDE 25 MG/ML
12.5-25 INJECTION INTRAMUSCULAR; INTRAVENOUS; SUBCUTANEOUS
Status: DISCONTINUED | OUTPATIENT
Start: 2018-02-13 | End: 2018-02-13 | Stop reason: CLARIF

## 2018-02-13 RX ORDER — PROCHLORPERAZINE MALEATE 5 MG
5 TABLET ORAL EVERY 6 HOURS PRN
Status: DISCONTINUED | OUTPATIENT
Start: 2018-02-13 | End: 2018-02-19 | Stop reason: HOSPADM

## 2018-02-13 RX ORDER — ACETAMINOPHEN 325 MG/1
650 TABLET ORAL EVERY 4 HOURS PRN
Status: DISCONTINUED | OUTPATIENT
Start: 2018-02-16 | End: 2018-02-19 | Stop reason: HOSPADM

## 2018-02-13 RX ORDER — ASPIRIN 81 MG/1
81 TABLET ORAL DAILY
Status: DISCONTINUED | OUTPATIENT
Start: 2018-02-14 | End: 2018-02-15

## 2018-02-13 RX ORDER — POTASSIUM CHLORIDE 29.8 MG/ML
20 INJECTION INTRAVENOUS
Status: DISCONTINUED | OUTPATIENT
Start: 2018-02-13 | End: 2018-02-19 | Stop reason: HOSPADM

## 2018-02-13 RX ADMIN — TRANEXAMIC ACID 125 MG/HR: 100 INJECTION, SOLUTION INTRAVENOUS at 16:53

## 2018-02-13 RX ADMIN — FENTANYL CITRATE 150 MCG: 50 INJECTION, SOLUTION INTRAMUSCULAR; INTRAVENOUS at 19:32

## 2018-02-13 RX ADMIN — PROTAMINE SULFATE 250 MG: 10 INJECTION, SOLUTION INTRAVENOUS at 19:49

## 2018-02-13 RX ADMIN — CEFAZOLIN 2 G: 1 INJECTION, POWDER, FOR SOLUTION INTRAMUSCULAR; INTRAVENOUS at 16:45

## 2018-02-13 RX ADMIN — GLYCOPYRROLATE 0.1 MG: 0.2 INJECTION, SOLUTION INTRAMUSCULAR; INTRAVENOUS at 14:32

## 2018-02-13 RX ADMIN — FENTANYL CITRATE 100 MCG: 50 INJECTION, SOLUTION INTRAMUSCULAR; INTRAVENOUS at 16:59

## 2018-02-13 RX ADMIN — PROPOFOL 100 MG: 10 INJECTION, EMULSION INTRAVENOUS at 16:20

## 2018-02-13 RX ADMIN — MUPIROCIN 0.5 G: 20 OINTMENT TOPICAL at 23:20

## 2018-02-13 RX ADMIN — FENTANYL CITRATE 100 MCG: 50 INJECTION, SOLUTION INTRAMUSCULAR; INTRAVENOUS at 21:17

## 2018-02-13 RX ADMIN — MIDAZOLAM 2 MG: 1 INJECTION INTRAMUSCULAR; INTRAVENOUS at 20:05

## 2018-02-13 RX ADMIN — NOREPINEPHRINE BITARTRATE 16 MCG: 1 INJECTION INTRAVENOUS at 16:27

## 2018-02-13 RX ADMIN — FENTANYL CITRATE 50 MCG: 50 INJECTION, SOLUTION INTRAMUSCULAR; INTRAVENOUS at 20:46

## 2018-02-13 RX ADMIN — MIDAZOLAM 2 MG: 1 INJECTION INTRAMUSCULAR; INTRAVENOUS at 17:17

## 2018-02-13 RX ADMIN — POTASSIUM CHLORIDE, DEXTROSE MONOHYDRATE AND SODIUM CHLORIDE: 150; 5; 450 INJECTION, SOLUTION INTRAVENOUS at 23:15

## 2018-02-13 RX ADMIN — SODIUM CHLORIDE, POTASSIUM CHLORIDE, SODIUM LACTATE AND CALCIUM CHLORIDE: 600; 310; 30; 20 INJECTION, SOLUTION INTRAVENOUS at 15:49

## 2018-02-13 RX ADMIN — SODIUM CHLORIDE, POTASSIUM CHLORIDE, SODIUM LACTATE AND CALCIUM CHLORIDE: 600; 310; 30; 20 INJECTION, SOLUTION INTRAVENOUS at 16:16

## 2018-02-13 RX ADMIN — FENTANYL CITRATE 50 MCG: 50 INJECTION, SOLUTION INTRAMUSCULAR; INTRAVENOUS at 18:30

## 2018-02-13 RX ADMIN — ONDANSETRON 4 MG: 2 INJECTION INTRAMUSCULAR; INTRAVENOUS at 10:15

## 2018-02-13 RX ADMIN — LIDOCAINE HYDROCHLORIDE 100 MG: 20 INJECTION, SOLUTION INFILTRATION; PERINEURAL at 16:20

## 2018-02-13 RX ADMIN — FENTANYL CITRATE 250 MCG: 50 INJECTION, SOLUTION INTRAMUSCULAR; INTRAVENOUS at 16:20

## 2018-02-13 RX ADMIN — PHENYLEPHRINE HYDROCHLORIDE 0.25 MCG/KG/MIN: 10 INJECTION, SOLUTION INTRAMUSCULAR; INTRAVENOUS; SUBCUTANEOUS at 16:28

## 2018-02-13 RX ADMIN — MIDAZOLAM 2 MG: 1 INJECTION INTRAMUSCULAR; INTRAVENOUS at 15:45

## 2018-02-13 RX ADMIN — Medication 40000 UNITS: at 17:04

## 2018-02-13 RX ADMIN — SODIUM CHLORIDE: 9 INJECTION, SOLUTION INTRAVENOUS at 16:14

## 2018-02-13 RX ADMIN — ROCURONIUM BROMIDE 50 MG: 10 INJECTION INTRAVENOUS at 16:21

## 2018-02-13 RX ADMIN — FENTANYL CITRATE 100 MCG: 50 INJECTION, SOLUTION INTRAMUSCULAR; INTRAVENOUS at 17:17

## 2018-02-13 RX ADMIN — PROPOFOL 25 MCG/KG/MIN: 10 INJECTION, EMULSION INTRAVENOUS at 21:04

## 2018-02-13 RX ADMIN — PROPOFOL 50 MCG/KG/MIN: 10 INJECTION, EMULSION INTRAVENOUS at 23:40

## 2018-02-13 RX ADMIN — FENTANYL CITRATE 100 MCG: 50 INJECTION, SOLUTION INTRAMUSCULAR; INTRAVENOUS at 16:38

## 2018-02-13 RX ADMIN — SODIUM CHLORIDE 1000 ML: 9 INJECTION, SOLUTION INTRAVENOUS at 10:31

## 2018-02-13 RX ADMIN — VECURONIUM BROMIDE 5 MG: 1 INJECTION, POWDER, LYOPHILIZED, FOR SOLUTION INTRAVENOUS at 17:21

## 2018-02-13 RX ADMIN — SODIUM CHLORIDE 90 ML: 9 INJECTION, SOLUTION INTRAVENOUS at 11:27

## 2018-02-13 RX ADMIN — TRANEXAMIC ACID 4000 MG/HR: 100 INJECTION, SOLUTION INTRAVENOUS at 16:38

## 2018-02-13 RX ADMIN — FENTANYL CITRATE 100 MCG: 50 INJECTION, SOLUTION INTRAMUSCULAR; INTRAVENOUS at 16:52

## 2018-02-13 RX ADMIN — NOREPINEPHRINE BITARTRATE 0.03 MCG/KG/MIN: 1 INJECTION INTRAVENOUS at 10:26

## 2018-02-13 RX ADMIN — FENTANYL CITRATE 100 MCG: 50 INJECTION, SOLUTION INTRAMUSCULAR; INTRAVENOUS at 20:05

## 2018-02-13 RX ADMIN — EPINEPHRINE 0.03 MCG/KG/MIN: 1 INJECTION INTRAMUSCULAR; INTRAVENOUS; SUBCUTANEOUS at 19:34

## 2018-02-13 RX ADMIN — CALCIUM CHLORIDE 500 MG: 100 INJECTION, SOLUTION INTRAVENOUS at 20:20

## 2018-02-13 RX ADMIN — VASOPRESSIN 3 UNITS/HR: 20 INJECTION INTRAVENOUS at 16:40

## 2018-02-13 RX ADMIN — NOREPINEPHRINE BITARTRATE 16 MCG: 1 INJECTION INTRAVENOUS at 16:33

## 2018-02-13 RX ADMIN — SODIUM CHLORIDE: 9 INJECTION, SOLUTION INTRAVENOUS at 12:07

## 2018-02-13 RX ADMIN — VANCOMYCIN HYDROCHLORIDE 1500 MG: 5 INJECTION, POWDER, LYOPHILIZED, FOR SOLUTION INTRAVENOUS at 13:34

## 2018-02-13 RX ADMIN — MIDAZOLAM 2 MG: 1 INJECTION INTRAMUSCULAR; INTRAVENOUS at 20:46

## 2018-02-13 RX ADMIN — VECURONIUM BROMIDE 10 MG: 1 INJECTION, POWDER, LYOPHILIZED, FOR SOLUTION INTRAVENOUS at 16:43

## 2018-02-13 RX ADMIN — MIDAZOLAM HYDROCHLORIDE 6 MG: 1 INJECTION, SOLUTION INTRAMUSCULAR; INTRAVENOUS at 14:51

## 2018-02-13 RX ADMIN — NOREPINEPHRINE BITARTRATE 16 MCG: 1 INJECTION INTRAVENOUS at 16:35

## 2018-02-13 RX ADMIN — NOREPINEPHRINE BITARTRATE 16 MCG: 1 INJECTION INTRAVENOUS at 16:30

## 2018-02-13 RX ADMIN — MIDAZOLAM 2 MG: 1 INJECTION INTRAMUSCULAR; INTRAVENOUS at 21:17

## 2018-02-13 RX ADMIN — VECURONIUM BROMIDE 5 MG: 1 INJECTION, POWDER, LYOPHILIZED, FOR SOLUTION INTRAVENOUS at 19:01

## 2018-02-13 RX ADMIN — SODIUM CHLORIDE, POTASSIUM CHLORIDE, SODIUM LACTATE AND CALCIUM CHLORIDE: 600; 310; 30; 20 INJECTION, SOLUTION INTRAVENOUS at 16:10

## 2018-02-13 RX ADMIN — BENZOCAINE 2 ML: 220 SPRAY, METERED PERIODONTAL at 14:34

## 2018-02-13 RX ADMIN — POTASSIUM CHLORIDE 20 MEQ: 29.8 INJECTION, SOLUTION INTRAVENOUS at 23:52

## 2018-02-13 RX ADMIN — TAZOBACTAM SODIUM AND PIPERACILLIN SODIUM 4.5 G: 500; 4 INJECTION, SOLUTION INTRAVENOUS at 10:57

## 2018-02-13 RX ADMIN — IOPAMIDOL 100 ML: 755 INJECTION, SOLUTION INTRAVENOUS at 11:26

## 2018-02-13 RX ADMIN — SODIUM CHLORIDE: 9 INJECTION, SOLUTION INTRAVENOUS at 17:15

## 2018-02-13 RX ADMIN — NITROGLYCERIN 0.07 MCG/KG/MIN: 20 INJECTION INTRAVENOUS at 21:15

## 2018-02-13 RX ADMIN — HYDROMORPHONE HYDROCHLORIDE 0.5 MG: 1 INJECTION, SOLUTION INTRAMUSCULAR; INTRAVENOUS; SUBCUTANEOUS at 23:40

## 2018-02-13 RX ADMIN — MIDAZOLAM 2 MG: 1 INJECTION INTRAMUSCULAR; INTRAVENOUS at 19:09

## 2018-02-13 RX ADMIN — FENTANYL CITRATE 100 MCG: 50 INJECTION, SOLUTION INTRAMUSCULAR; INTRAVENOUS at 14:51

## 2018-02-13 RX ADMIN — SODIUM CHLORIDE 1000 ML: 9 INJECTION, SOLUTION INTRAVENOUS at 10:18

## 2018-02-13 RX ADMIN — SODIUM CHLORIDE 1 UNITS/HR: 9 INJECTION, SOLUTION INTRAVENOUS at 23:14

## 2018-02-13 RX ADMIN — MIDAZOLAM 2 MG: 1 INJECTION INTRAMUSCULAR; INTRAVENOUS at 16:20

## 2018-02-13 RX ADMIN — MIDAZOLAM 2 MG: 1 INJECTION INTRAMUSCULAR; INTRAVENOUS at 18:17

## 2018-02-13 RX ADMIN — MIDAZOLAM 2 MG: 1 INJECTION INTRAMUSCULAR; INTRAVENOUS at 19:32

## 2018-02-13 RX ADMIN — COAGULATION FACTOR VIIA (RECOMBINANT) 4491 MCG: KIT at 20:33

## 2018-02-13 RX ADMIN — NOREPINEPHRINE BITARTRATE 0.06 MCG/KG/MIN: 1 INJECTION INTRAVENOUS at 16:30

## 2018-02-13 ASSESSMENT — ENCOUNTER SYMPTOMS
BACK PAIN: 0
NUMBNESS: 1
BLOOD IN STOOL: 0
COUGH: 0
ABDOMINAL PAIN: 0
VOMITING: 0
NAUSEA: 0
DIAPHORESIS: 1
LIGHT-HEADEDNESS: 1
SHORTNESS OF BREATH: 1
HEADACHES: 1

## 2018-02-13 NOTE — IP AVS SNAPSHOT
MRN:[id number]                      After Visit Summary   2/13/2018    Vincenzo Moss    MRN: [id number]           Thank you!     Thank you for choosing [facility] for your care. Our goal is always to provide you with excellent care. Hearing back from our patients is one way we can continue to improve our services. Please take a few minutes to complete the written survey that you may receive in the mail after you visit with us. Thank you!        Patient Information     Date Of Birth          1950        Designated Caregiver       Most Recent Value    Caregiver    Will someone help with your care after discharge? yes    Name of designated caregiver Pricila    Phone number of caregiver [phone]    Caregiver address .      About your hospital stay     You were admitted on:  February 13, 2018 You last received care in the:  [place] [facility] [facility] [facility]    You were discharged on:  February 19, 2018        Reason for your hospital stay       Emergent Ascending Aortic Dissection Repair                  Who to Call     For medical emergencies, please call 911.  For non-urgent questions about your medical care, please call your primary care provider or clinic, [phone]  For questions related to your surgery, please call your surgery clinic        Attending Provider     Provider Specialty    Vandana Messer MD Emergency Medicine    [facility], Royce Harvey MD Critical Care Medicine    [facility], Naomie Dunlap MD Thoracic Diseases       Primary Care Provider Office Phone # Fax #    Michael Mcbride [phone]      After Care Instructions     Activity       Your activity upon discharge: activity as tolerated, no driving for 4 weeks, do not lift more than 10lbs            Diet       Follow this diet upon discharge: Orders Placed This Encounter      Moderate Consistent CHO Diet            Discharge Instructions       *Weight yourself daily and please call  if weight gain 2-3 lbs over 24 hours or if greater than 5 lbs in 1 week as this may indicate fluid overload and could signify a problem with your heart. Please also call if you notice worsening swelling in your legs as this may indicate fluid overload as well.  A certain amount of swelling is expected, especially if you had vein taken out of your leg for bypass surgery.  Keep your legs elevated above the level of your heart when you are in bed to help drainage.  *No lifting more than 10 lbs for 8-12 weeks.  *No driving for 1 month.  *Call for pain medication refill.  Other medications should be filled by your primary doctor.  *Call for temperature greater than 101 degree Fahrenheit, signs of incision infection, such as redness, drainage, odor, pain.  *Daily shower with antibacterial soap. You can gently clean incision.  *Some popping or clicking sensations can be normal after open heart surgery as the chest has been stretched open.  If it is associated with significant pain or becomes bothersome, please call.  *Some drainage from the sites where your chest tubes were is normal and can persist for awhile until the tract has healed, it is best to keep this open to air to allow scab formation and healing, but you can cover it with a sterile gauze pad if necessary for comfort.  *It can be normal to have a decrease appetite for awhile after surgery.  You can eat whatever it takes to keep up a normal food/calorie intake in the immediate post-operative period for about a month or so, and eventually we will want you to adopt a heart healthy diet, especially if you have coronary artery disease.  *If you are going home, please set up outpatient cardiac rehabilitation as soon as possible, knowing that there may be a bit of a delay before you can start after you get discharged from the hospital.  In the meantime, continue to work on the rehab activities you learned in the hospital and maintain your physical activity.  Aerobic  "activity, especially walking, is a great way to regain your physical endurance.  *Please continue docusate (Colace) for as long as you are on narcotic pain medications (oxycodone, Oxycontin, MS Contin), as these medication can be constipating.  You can discontinue these once you are off these medications and having normal bowel movements or sooner if you are experiencing diarrhea.  *You may use Tylenol (acetaminophen) as needed for pain as well to help transition off of narcotic pain medications, as long as you have no problems with your liver.  Keep intake less than 4 g (4,000 mg) of Tylenol daily.  *The following medications may or may not be new for you:   -Aspirin: this helps to thin your blood and possibly prevent clot formation, this can   also be hard on your stomach though so be sure to use the \"enteric coated\"   Version.  You may be prescribed either the baby aspirin dose (81 mg), or the   adult dose (325 mg) depending on your situation.     -Beta blocker (carvedilol, atenolol, metoprolol): this medication lowers your blood   pressure and heart rate and is helpful for your heart, especially if you have had a   heart attack or bypass surgery.  This medication can also help prevent atrial   fibrillation, a heart rhythm disturbance, or control the rate of it so your heart isn't   beating so fast.     -Cholesterol medication (atorvastatin, simvastatin, rosuvastatin): these    medications help decrease cholesterol, which is a contributor to plaque formation  in your arteries.   -Zantac (ranitidine)/Protonix (pantoprazole): these medication reduce the acid in your stomach, which can be high from the stress of surgery.  These should be continued in the post-operative period, but don't necessarily need to continue long term, unless you were previously on them or had an ulcer.            Monitor and record       Weight and blood pressure daily                  Follow-up Appointments     Follow-up and recommended " labs and tests        *Follow up primary care provider in 7-10 days after discharge in order to review your medication, vital signs, obtain any necessary lab work your doctor may want, and to update them on your hospitalization and medical issues.  *Follow up with Nicole Melara PA-C for Dr Toledo, heart surgeon, on 3/8/18 at 3pm at [facility] at [facility] [address]. If any questions or concerns call [phone].  You will see us once at this visit and then if everything is going well you will not need to see us again.  You will follow long term with your cardiologist.  *Follow up with Dr Velazquez, cardiologist, 3/14/18 at 1:15pm. This is who you will follow with long term about your heart issues. [phone].  *For [facility] outpatient cardiac rehab, call [phone].                  Your next 10 appointments already scheduled     Mar 08, 2018  3:00 PM CST   Post-Op with [facility] CARDIOTHORACIC [facility]GERY[place]   [facility] Cardiothoracic ([facility])    [address] [facility] [id number] [phone]            Mar 14, 2018  1:15 PM CDT   New Visit with Can Velazquez MD   [facility] ([facility])    [address] [place], [place] [address]  [facility] [address] [phone]              Additional Services     CARDIAC REHAB REFERRAL       Please be aware that coverage of these services is subject to the terms and limitations of your health insurance plan.  Call member services at your health plan with any benefit or coverage questions.     Order is sent electronically to central rehab scheduling. Call [phone] if you haven't been contacted regarding these appointments within 2 business days of discharge.            CARDIAC REHAB REFERRAL       *This therapy referral will be filtered to a centralized scheduling office at [facility] and the patient will receive a call to schedule an appointment at a [facility] location most  "convenient for them.*     If you have not heard from the scheduling office within 2 business days, please call [phone] for all locations.    Please be aware that coverage of these services is subject to the terms and limitations of your health insurance plan.  Call member services at your health plan with any benefit or coverage questions.      **Note to Provider:  If you are referring outside of [facility] for the therapy appointment, please list the name of the location in the \"special instructions\" above, print the referral and give to the patient to schedule the appointment.     Diagnosis of aortic aneurysm/repair of aortic dissection                  Further instructions from your care team       YOUR CARE AFTER HEART SURGERY   DISCHARGE INTRUCTIONS      Weight - Weigh yourself daily and record on daily weight sheet provided in this packet.     Incentive Spirometer - Continue to use 3 to 4 times a day for 10 days; follow use of spirometer with coughing. Use attached sheet to report progress.     Daily shower - Wash all surgical incisions daily with liquid antibacterial soap, such as Dial.   No tub baths until incisions are healed. The sticky glue used to close your incisions may peel off slowly.    Incisions - Avoid all lotions, oils, ointments or sunscreen on incisions for 8 weeks. Avoid sunlight on incision sites for 8 weeks and then use sunscreen on incisions for one year.   You may have numbness, tingling, and increased sensitivity around your incision lines for several weeks/months as the nerves grow back. Some drainage from the sites where your chest tubes were is normal and can persist for a while until it has healed. It is best to keep this open to air to allow scab formation and healing, you can cover it with a gauze pad or band-aid if needed.     Diet - Eat a well balanced diet to promote healing. It can be normal to have a decreased appetite for a while after surgery. You can eat whatever it takes " to keep up a normal food/calorie intake in the immediate post-operative period for about a month. Try to limit high sodium (salt) foods to prevent fluid retention.  If you are a diabetic, continue to balance your carbohydrate intake with your medicine. Eventually you will need to adopt a heart healthy diet, especially if you have coronary artery disease.     Daily exercise/activity precautions - Cardiac rehab therapist will review your restrictions with you.  No lifting, pushing or pulling anything over 10 pounds for 12 weeks if you are a diabetic or 8 weeks if you are not a diabetic (reference: a gallon of milk weighs 8 pounds).    Positioning -Keep your legs elevated above the level of your heart when you are in bed. You may lie on your side and stomach if it s comfortable and you have no sternal click (popping in breastbone).    Pain medications - Use as directed. Call surgeon for pain medication refill if needed. Other medications should be filled by your primary doctor.     Depression - It is not uncommon after surgery.  If it lasts longer than two weeks or you feel you need to talk to someone, contact your primary physician.    Driving -No driving for 4 weeks from the day of surgery (or until your surgeon has approved it).    Work and Travel - Consult with your physician about specific work and travel restrictions    Cardiac Rehabilitation - Usually begins approximately one week after discharge and lasts up to 6 weeks. In the meantime, continue to work on the rehab activities you learned in the hospital and maintain your physical activity. Aerobic activity, especially walking, is a great way to regain your physical endurance.     Checking your Blood sugar (glucose) - If you have been instructed to begin checking your blood sugars at home, record them on the back page of this packet and take the packet with you to your follow appointment with you primary physician (usually about 1 week after  surgery).          CALL YOUR SURGEON IF ANY OF THE FOLLOWING OCCURS:      Fever - If you feel chills, shaking, or your temperature is over 101 degrees    Sternal Click - Some popping or clicking sensations can be normal as the chest has been stretched open. If you notice a significant change or if pain becomes bothersome, please call.    Angina/Chest Pain - Or symptoms similar to those you experienced before surgery    Infection - If incisions look infected (increasing redness, tenderness, swelling, warmth, odor, drainage, or change in color if drainage).    Weight gain - Please call if weight gain of 2-3pounds over 24 hours or if greater than 5 pounds in 1 week as this may indicate fluid overload and could signify a problem with your heart.     Swelling - If you notice worsening swelling in your legs. A certain amount of swelling is expected, especially if you had a vein taken out of your leg for bypass surgery.      Shortness of breath - Not relieved by rest    Persistent heart palpitations - Rapid, pounding heartbeat    Dizziness/lightheadedness - While sitting or at rest    Pain - Not relieved by pain medications      Your Daily Weight  Weigh yourself every morning in the same clothes after urinating and before breakfast.* Call your physician if your weight increases 2-3 lbs in one day or 3-5 lbs in a week**  My baseline weight (first morning home):____________   Date Weight   Date Weight   1.    17.     2.    18.     3.    19.     4.    20.     5.    21.     6.    22.     7.    23.     8.    24.     9.    25.     10.    26.     11.    27.     12.    28.     13.    29.     14.    30.     15.    31.     16.    32.           Incentive Spirometer- After Surgery Progress Record    Discharge Volume_______________ml    As you recover from your surgery it is important to continue the use of your incentive spirometer at home.  We recommend that you use your spirometer at least 3-4 times per day.  You should take 5 slow  "deep breaths with each use. Inhale slowly to raise the piston in the chamber as high as you are able.  Hold breath to count of 3 and then exhale normally.  Allow piston to return to bottom of chamber, rest and repeat 4 more times. It is helpful to see your progress by recording your average volume in the spaces provided below.    Day  1       Day  2       Day  3       Day  4       Day  5       Day  6       Day  7       Day  8       Day  9       Day  10             Pending Results     Date and Time Order Name Status Description    2/13/2018 1851 Cryoprecipitate prepare order unit In process     2/13/2018 1507 Plasma prepare order unit In process             Statement of Approval     Ordered          02/19/18 1441  I have reviewed and agree with all the recommendations and orders detailed in this document.  EFFECTIVE NOW     Approved and electronically signed by:  Nicole Melara PA-C             Admission Information     Date & Time Provider Department Dept. Phone    2/13/2018 Naomie Toledo MD [place] [address] [facility] [phone]      Your Vitals Were     Blood Pressure Pulse Temperature Respirations Height Weight    111/60 (BP Location: Right arm) 75 99.1  F (37.3  C) (Oral) 16 1.778 m (5' 10\") 105.6 kg (232 lb 12.9 oz)    Pulse Oximetry BMI (Body Mass Index)                93% 33.4 kg/m2          MyChart Information     [employer] lets you send messages to your doctor, view your test results, renew your prescriptions, schedule appointments and more. To sign up, go to [email or web address] . Click on \"Log in\" on the left side of the screen, which will take you to the Welcome page. Then click on \"Sign up Now\" on the right side of the page.     You will be asked to enter the access code listed below, as well as some personal information. Please follow the directions to create your username and password.     Your access code is: [email or web address]  Expires: 5/20/2018 10:00 AM     Your access " code will  in 90 days. If you need help or a new code, please call your [facility] clinic or [phone].        Care EveryWhere ID     This is your Care EveryWhere ID. This could be used by other organizations to access your [facility] medical records  [id number]        Equal Access to Services     KATI CONWAY : Hadii raymundo gilbert hadasho Soomaali, waaxda luqadaha, qaybta kaalmada adeegyada, joe stewartmamiesacha torres. So [facility] [phone].    ATENCIÓN: Si habla español, tiene a antonio disposición servicios gratuitos de asistencia lingüística. Llleonela al [phone].    We comply with applicable federal civil rights laws and [place] laws. We do not discriminate on the basis of race, color, national origin, age, disability, sex, sexual orientation, or gender identity.               Review of your medicines      START taking        Dose / Directions    acetaminophen 325 MG tablet   Commonly known as:  TYLENOL        Dose:  650 mg   Take 2 tablets (650 mg) by mouth every 4 hours as needed for other   Quantity:  100 tablet   Refills:  1       aspirin 81 MG chewable tablet        Dose:  81 mg   Start taking on:  2018   Take 1 tablet (81 mg) by mouth daily   Quantity:  30 tablet   Refills:  3       metoprolol tartrate 50 MG tablet   Commonly known as:  LOPRESSOR        Dose:  50 mg   Take 1 tablet (50 mg) by mouth 2 times daily   Quantity:  60 tablet   Refills:  3       oxyCODONE IR 5 MG tablet   Commonly known as:  ROXICODONE        Dose:  5-10 mg   Take 1-2 tablets (5-10 mg) by mouth every 4 hours as needed for other (pain control or improvement in physical function. Hold dose for analgesic side effects.)   Quantity:  40 tablet   Refills:  0       senna-docusate 8.6-50 MG per tablet   Commonly known as:  SENOKOT-S;PERICOLACE        Dose:  1 tablet   Take 1 tablet by mouth 2 times daily as needed for constipation   Quantity:  100 tablet   Refills:  1         CONTINUE these medicines which may have  CHANGED, or have new prescriptions. If we are uncertain of the size of tablets/capsules you have at home, strength may be listed as something that might have changed.        Dose / Directions    losartan 25 MG tablet   Commonly known as:  COZAAR   This may have changed:  how much to take        Dose:  25 mg   Start taking on:  2/20/2018   Take 1 tablet (25 mg) by mouth daily   Quantity:  30 tablet   Refills:  3         CONTINUE these medicines which have NOT CHANGED        Dose / Directions    atorvastatin 10 MG tablet   Commonly known as:  LIPITOR        Dose:  10 mg   Take 1 tablet (10 mg) by mouth daily   Quantity:  30 tablet   Refills:  3       CALCIUM + D PO        Dose:  600 mg   Take 600 mg by mouth daily   Refills:  0       Multi-vitamin Tabs tablet        Dose:  1 tablet   Take 1 tablet by mouth daily   Refills:  0            Where to get your medicines      These medications were sent to [facility] [place], [place] - [address]  [address], [place] [address]     Phone:  [phone]     acetaminophen 325 MG tablet    aspirin 81 MG chewable tablet    atorvastatin 10 MG tablet    losartan 25 MG tablet    metoprolol tartrate 50 MG tablet    senna-docusate 8.6-50 MG per tablet         Some of these will need a paper prescription and others can be bought over the counter. Ask your nurse if you have questions.     Bring a paper prescription for each of these medications     oxyCODONE IR 5 MG tablet                Protect others around you: Learn how to safely use, store and throw away your medicines at www.disposemymeds.org.        Information about OPIOIDS     PRESCRIPTION OPIOIDS: WHAT YOU NEED TO KNOW    Prescription opioids can be used to help relieve moderate to severe pain and are often prescribed following a surgery or injury, or for certain health conditions. These medications can be an important part of treatment but also come with serious risks. It is important to work  with your health care provider to make sure you are getting the safest, most effective care.    WHAT ARE THE RISKS AND SIDE EFFECTS OF OPIOID USE?  Prescription opioids carry serious risks of addiction and overdose, especially with prolonged use. An opioid overdose, often marked by slowed breathing can cause sudden death. The use of prescription opioids can have a number of side effects as well, even when taken as directed:      Tolerance - meaning you might need to take more of a medication for the same pain relief    Physical dependence - meaning you have symptoms of withdrawal when a medication is stopped    Increased sensitivity to pain    Constipation    Nausea, vomiting, and dry mouth    Sleepiness and dizziness    Confusion    Depression    Low levels of testosterone that can result in lower sex drive, energy, and strength    Itching and sweating    RISKS ARE GREATER WITH:    History of drug misuse, substance use disorder, or overdose    Mental health conditions (such as depression or anxiety)    Sleep apnea    Older age (65 years or older)    Pregnancy    Avoid alcohol while taking prescription opioids.   Also, unless specifically advised by your health care provider, medications to avoid include:    Benzodiazepines (such as Xanax or Valium)    Muscle relaxants (such as Soma or Flexeril)    Hypnotics (such as Ambien or Lunesta)    Other prescription opioids    KNOW YOUR OPTIONS:  Talk to your health care provider about ways to manage your pain that do not involve prescription opioids. Some of these options may actually work better and have fewer risks and side effects:    Pain relievers such as acetaminophen, ibuprofen, and naproxen    Some medications that are also used for depression or seizures    Physical therapy and exercise    Cognitive behavioral therapy, a psychological, goal-directed approach, in which patients learn how to modify physical, behavioral, and emotional triggers of pain and stress    IF  YOU ARE PRESCRIBED OPIOIDS FOR PAIN:    Never take opioids in greater amounts or more often than prescribed    Follow up with your primary health care provider and work together to create a plan on how to manage your pain.    Talk about ways to help manage your pain that do not involve prescription opioids    Talk about all concerns and side effects    Help prevent misuse and abuse    Never sell or share prescription opioids    Never use another person's prescription opioids    Store prescription opioids in a secure place and out of reach of others (this may include visitors, children, friends, and family)    Visit www.cdc.gov/drugoverdose to learn about risks of opioid abuse and overdose    If you believe you may be struggling with addiction, tell your health care provider and ask for guidance or call [facility]'s National Helpline at 7-814-312-HELP    LEARN MORE / www.cdc.gov/drugoverdose/prescribing/guideline.html    Safely dispose of unused prescription opioids: Find your local drug take-back programs and more information about the importance of safe disposal at www.doseofreality.[place].gov             Medication List: This is a list of all your medications and when to take them. Check marks below indicate your daily home schedule. Keep this list as a reference.      Medications           Morning Afternoon Evening Bedtime As Needed    acetaminophen 325 MG tablet   Commonly known as:  TYLENOL   Take 2 tablets (650 mg) by mouth every 4 hours as needed for other   Last time this was given:  650 mg on 2/19/2018  4:54 AM                                   aspirin 81 MG chewable tablet   Take 1 tablet (81 mg) by mouth daily   Start taking on:  2/20/2018   Last time this was given:  81 mg on 2/19/2018  8:36 AM                                   atorvastatin 10 MG tablet   Commonly known as:  LIPITOR   Take 1 tablet (10 mg) by mouth daily   Last time this was given:  10 mg on 2/19/2018  8:36 AM                                    CALCIUM + D PO   Take 600 mg by mouth daily                                   losartan 25 MG tablet   Commonly known as:  COZAAR   Take 1 tablet (25 mg) by mouth daily   Start taking on:  2/20/2018   Last time this was given:  25 mg on 2/19/2018  8:36 AM                                   metoprolol tartrate 50 MG tablet   Commonly known as:  LOPRESSOR   Take 1 tablet (50 mg) by mouth 2 times daily   Last time this was given:  50 mg on 2/19/2018  8:36 AM                                   Multi-vitamin Tabs tablet   Take 1 tablet by mouth daily                                   oxyCODONE IR 5 MG tablet   Commonly known as:  ROXICODONE   Take 1-2 tablets (5-10 mg) by mouth every 4 hours as needed for other (pain control or improvement in physical function. Hold dose for analgesic side effects.)   Last time this was given:  5 mg on 2/15/2018  2:27 PM                                   senna-docusate 8.6-50 MG per tablet   Commonly known as:  SENOKOT-S;PERICOLACE   Take 1 tablet by mouth 2 times daily as needed for constipation
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
## 2018-02-13 NOTE — ANESTHESIA PROCEDURE NOTES
ARTERIAL LINE PROCEDURE NOTE:   Pre-Procedure  Performed by ANA ALFARO  Location: pre-op      Pre-Anesthestic Checklist: patient identified, IV checked, risks and benefits discussed, informed consent, monitors and equipment checked and pre-op evaluation    Timeout  Correct Patient: Yes   Correct Procedure: Yes   Correct Site: Yes   Correct Laterality: N/A   Correct Position: Yes   Site Marked: N/A   .   Procedure Documentation  Procedure: arterial line    Supine  Insertion Site:left, radial.Skin infiltrated with mL of 1% lidocaine. Injection technique: Seldinger Technique  .  .  Patient Prep;all elements of maximal sterile barrier technique followed, mask, hat, sterile gown, sterile gloves, draped, hand hygiene, chlorhexidine gluconate and isopropyl alcohol    Assessment/Narrative    Catheter: 20 gauge, 1.75 in/4.5 cm quick cath (integral wire)          Arterial waveform: Yes IBP within 10% of NIBP: Yes

## 2018-02-13 NOTE — CONSULTS
"Critical Care  Note      02/13/2018    Name: Vincenzo Moss MRN#: [id number]   Age: 67 year old YOB: 1950     [facility]  [facility]1    MV DAY #             Problem List:   Active Problems:    * No active hospital problems. *  1. Acute dissection of ascending aortic anuerysm resulting in pericardial effusion (bloody) and hypotension requiring Levophed support (functionally, he had obstructive shock due to partial tamponade. He had a OLI in ICU confirming diagnosis and was \"whisked away\" to OR for surgical intervention. Of note, he remained clinically compensated throughout the course of events this afternoon.   2. History of HTN  3. Dyslipidemia   4. GERD  5. BPH  6. History of Urinary frequency  Overall, acute and critical. 40 minutes of critical care time spent at bedside.          Summary/Hospital Course:   See below        Assessment and plan :     Vincenzo Moss IS a 67 year old male admitted on 2/13/2018 with symptoms of syncopal symptoms, abilio and neck pain (right sided) which is what prompted visit to ED. In ED, he was noted to be hypotensive, received 2 liters of IV NS, and was started on Levophed while work up was undertaken. He had a head CT and CTA (negative) and a CT chest showing an ascending aortic AAA and a pericardial effusion.  He was admitted to ICU for the purpose of consulting CV surgery and an emergency OLI. The OLI showed evidence of a \"rent\" in aortic wall, confirming a leaking anuerysm. He was taken to OR emergently.   I have personally reviewed the daily labs, imaging studies, cultures and discussed the case with referring physician and consulting physicians.     My assessment and plan by system for this patient is as follows:    Neurology/Psychiatry:   1. Normal    Cardiovascular:   1.Hemodynamics - obstructive shock due to pericardial tamponade- well compensated after IV fluid (2 liters by resport) and Levophed. He is on his way to OR.    Pulmonary/Ventilator " Management:   1. He was oxygenating well on room air prior to going to OR. I expect he will come back on a ventilator    GI and Nutrition :   1. Deferred at present     Renal/Fluids/Electrolytes:   1. His renal function appeared to be normal at the time of assessment     Infectious Disease:   1. No issues at present. Antibiotics were started in ED with concern of possible septic shock, but will likely stop them as the cause of shock now known.     Endocrine:   1. Monitor glucose expectantly     Hematology/Oncology:   1. No issues at present.      IV/Access:   1. Venous access - peripheral IV  2. Arterial access - pending   3.  Plan  - central access required and necessary      ICU Prophylaxis:   1. DVT: Hep Subq/ LMWH/mechanical  2. VAP: HOB 30 degrees, chlorhexidine rinse  3. Stress Ulcer: PPI/H2 blocker  4. Restraints: Nonviolent soft two point restraints required and necessary for patient safety and continued cares and good effect as patient continues to pull at necessary lines, tubes despite education and distraction. Will readdress daily.   5. Wound care  -   6. Feeding - deferred   7. Family Update: discussed with wife and children at bedside before surgery   8. Disposition - to OR        Key goals for next 24 hours:   1. Pending return from OR  2.  3.               Medical History:     Past Medical History:   Diagnosis Date     BPH (benign prostatic hyperplasia)      GERD (gastroesophageal reflux disease)      [place] (hard of hearing)      Hyperlipidemia      Hypertension      Urinary frequency      Past Surgical History:   Procedure Laterality Date     COLONOSCOPY       CYSTOSCOPY, TRANSURETHRAL RESECTION (TUR) PROSTATE, COMBINED N/A 12/12/2016    Procedure: COMBINED CYSTOSCOPY, TRANSURETHRAL RESECTION (TUR) PROSTATE;  Surgeon: Derian Dash MD;  Location:  OR     HEAD & NECK SURGERY      brain surgery after childhood trauma     HERNIA REPAIR       ORTHOPEDIC SURGERY       Social History     Social History      Marital status:      Spouse name: N/A     Number of children: N/A     Years of education: N/A     Occupational History     Not on file.     Social History Main Topics     Smoking status: Never Smoker     Smokeless tobacco: Not on file     Alcohol use No     Drug use: No     Sexual activity: Not on file     Other Topics Concern     Not on file     Social History Narrative      No Known Allergies           Key Medications:       lidocaine 1% with EPINEPHrine 1:100,000  10 mL Intradermal Once     vancomycin (VANCOCIN) IV  1,500 mg Intravenous Pre-Op/Pre-procedure x 1 dose     vancomycin (VANCOCIN) IV  1,500 mg Intravenous See Admin Instructions     ceFAZolin  2 g Intravenous Pre-Op/Pre-procedure x 1 dose     ceFAZolin  1 g Intravenous See Admin Instructions     ceFAZolin  2 g Intravenous Pre-Op/Pre-procedure x 1 dose     ceFAZolin  1 g Intravenous See Admin Instructions       NaCl       norepinephrine 0.06 mcg/kg/min (02/13/18 1452)     phenylephrine IV infusion ADULT          Home Meds    No current facility-administered medications on file prior to encounter.   Current Outpatient Prescriptions on File Prior to Encounter:  Atorvastatin Calcium (LIPITOR PO) Take 10 mg by mouth daily   Calcium Citrate-Vitamin D (CALCIUM + D PO) Take 600 mg by mouth daily   Losartan Potassium (COZAAR PO) Take 50 mg by mouth daily   multivitamin, therapeutic with minerals (MULTI-VITAMIN) TABS tablet Take 1 tablet by mouth daily              Physical Examination:   Temp:  [96.6  F (35.9  C)] 96.6  F (35.9  C)  Pulse:  [] 94  Heart Rate:  [] 94  Resp:  [7-38] 7  BP: ()/() 101/86  SpO2:  [82 %-100 %] 99 %    Intake/Output Summary (Last 24 hours) at 02/13/18 1726  Last data filed at 02/13/18 1721   Gross per 24 hour   Intake             1800 ml   Output              150 ml   Net             1650 ml     Wt Readings from Last 4 Encounters:   02/13/18 99.8 kg (220 lb)   12/12/16 99.3 kg (219 lb)   08/20/16  101.2 kg (223 lb)     BP - Mean:  [] 92  Resp: 7  No lab results found in last 7 days.    NOTE: Physical exam was deferred at the initial interaction given urgency of care delivered and immediate disposition to OR.     GEN: no acute distress   HEENT: head ncat, sclera anicteric, OP patent, trachea midline   PULM: unlabored synchronous with vent, clear anteriorly    CV/COR: RRR S1S2 no gallop,  No rub, no murmur  ABD: soft nontender, hypoactive bowel sounds, no mass  EXT:  Edema   warm  NEURO: grossly intact  SKIN: no obvious rash  LINES: clean, dry intact         Data:   All data and imaging reviewed     ROUTINE ICU LABS (Last four results)  CMP  Recent Labs  Lab 02/13/18  1011 02/13/18  0957    141   POTASSIUM 4.0 4.0   CHLORIDE  --  106   CO2  --  26   ANIONGAP  --  9   GLC  --  140*   BUN  --  20   CR  --  1.47*   GFRESTIMATED  --  48*   GFRESTBLACK  --  58*   TAMARA  --  8.5   PROTTOTAL  --  7.2   ALBUMIN  --  4.0   BILITOTAL  --  0.8   ALKPHOS  --  70   AST  --  35   ALT  --  47     CBC  Recent Labs  Lab 02/13/18  1011 02/13/18  0957   WBC  --  11.9*   RBC  --  4.71   HGB 14.3 14.6   HCT  --  43.3   MCV  --  92   MCH  --  31.0   MCHC  --  33.7   RDW  --  13.9   PLT  --  151     INR  Recent Labs  Lab 02/13/18  0957   INR 1.04     Arterial Blood GasNo lab results found in last 7 days.    All cultures:  No results for input(s): CULT in the last 168 hours.  Recent Results (from the past 24 hour(s))   CT Head w/o Contrast    Narrative    CT OF THE HEAD WITHOUT CONTRAST  2/13/2018 11:42 AM     COMPARISON: None.    HISTORY:  Headache, hypotensive, lightheaded, neck and chest pain.     TECHNIQUE: Axial CT images of the head from the skull base to the  vertex were acquired without IV contrast.    FINDINGS:  The ventricles and basal cisterns are within normal limits  in configuration. There is no midline shift. There are no extra-axial  fluid collections.  Gray-white differentiation is well maintained.     No  intracranial hemorrhage, mass or recent infarct.    The visualized paranasal sinuses are well aerated. There is no  mastoiditis. There are no fractures of the visualized bones.       Impression    IMPRESSION:  Normal head CT.     Radiation dose for this scan was reduced using automated exposure  control, adjustment of the mA and/or kV according to patient size, or  iterative reconstruction technique.    DEAN MORGAN MD   CT Aortic Survey w Contrast    Narrative    CT AORTIC SURVEY WITH CONTRAST 2/13/2018 12:04 PM    HISTORY: Chest pain, neck pain and headache with hypotension.    TECHNIQUE: Helical axial scans from the lung apices through pubic  symphysis with 100 mL Isovue-370 IV contrast. Radiation dose for this  scan was reduced using automated exposure control, adjustment of the  mA and/or kV according to patient size, or iterative reconstruction  technique.    COMPARISON: None.    FINDINGS:    Chest: There is a fusiform ascending thoracic aortic aneurysm  measuring 6.5 cm maximum transverse diameter. Descending thoracic  aorta is of normal caliber with mild tortuosity. There is no CT  evidence for intimal dissection. There is a pericardial effusion  measuring 1.2 cm in thickness, predominantly anterior. Mediastinal and  hilar structures otherwise appear normal. However, pulmonary arteries  are not well opacified with contrast and this study is not sufficient  for diagnosis or exclusion of pulmonary emboli. The lungs are clear  bilaterally.    Abdomen and pelvis: Abdominal aorta is of normal caliber with no  evidence for intimal dissection, aneurysm or atherosclerotic disease.  Major aortic branches are patent. There is a 1 cm lucency in the  lateral segment of the left lobe of the liver which is too small to  characterize but most likely represents a benign cyst. There is  thickening of the gallbladder wall which could indicate cholecystitis.  Ultrasound exam is recommended. The spleen, pancreas,  bilateral  adrenal glands and kidneys bilaterally are unremarkable. The bowel and  mesentery in the upper abdomen appear normal.    Scans through the pelvis show no acute abnormality. Iliac vessels show  no aneurysm or dissection. The appendix is identified and appears  normal. No free fluid. There is a 2 x 1.5 cm focus of increased bony  density in the posterior left iliac bone (image 151 of series 5) which  is nonspecific but may represent a benign bone island. Bone scan is  recommended if the patient has any history of primary malignancy with  propensity for sclerotic bone metastases.      Impression    IMPRESSION:  1. Fusiform ascending thoracic aortic aneurysm measuring up to 6.5 cm.  2. Pericardial effusion.  3. Probable small cyst left lobe of liver.  4. Thickening of the gallbladder wall could indicate cholecystitis and  ultrasound exam is recommended.  5. Sclerotic focus posterior left iliac bone.    DARSHANA ZAMORA MD   Head/neck angiogram CT  w & w/o contrast    Narrative    CT ANGIOGRAM OF THE HEAD AND NECK WITHOUT AND WITH CONTRAST February 13, 2018 12:00 PM     HISTORY: Headache, lightheaded, neck pain, hypotensive.    TECHNIQUE: Precontrast localizing scans were followed by CT  angiography with an injection of 100mL Isovue-370 nonionic intravenous  contrast material with scans through the head and neck.  Images were  transferred to a separate 3-D workstation where multiplanar  reformations and 3-D images were created.  Estimates of carotid  stenoses are made relative to the distal internal carotid artery  diameters except as noted. Radiation dose for this scan was reduced  using automated exposure control, adjustment of the mA and/or kV  according to patient size, or iterative reconstruction technique.    COMPARISON: None.    FINDINGS:   Neck CTA: The common carotid arteries bilaterally are tortuous but are  patent without stenosis. The cervical internal carotid arteries  bilaterally are tortuous but  are patent without stenosis. The  vertebral arteries bilaterally are patent without stenosis.    Head CTA: The basilar, bilateral distal internal carotid, bilateral  anterior cerebral, bilateral middle cerebral and bilateral posterior  cerebral arteries are patent and unremarkable. The anterior  communicating artery is patent and unremarkable.      Impression    IMPRESSION: Normal neck and head CTA.      MD Joanne DUARTE MD    Billing: This patient is critically ill: Yes. Total critical care time today 40 min.

## 2018-02-13 NOTE — CONSULTS
Addendum:  OLI with possible root dissection, will require surgical exploration, most likely repair of Type A dissection and root.     Will Lopez MD  Cardiothoracic Surgery Fellow  [phone]      CARDIAC SURGERY CONSULT NOTE    Consult Reason: Ascending Aortic Aneurysm, possible aortic dissection    HPI:   Mr. Vincenzo Moss is a 68 y/o man with a PMH significant for HTN, HLD who presented to the ED today with left sided facial pain, neck pain and headache that awoke him from sleep at 3 AM. He subsequently developed chest tightness and while undergoing his CTA chest pain and back pain. In the ED he underwent CTA to evaluated for acute aortic dissection and this identified no dissection flap, however a 6.5 cm Ascending aneurysm and pericardial effusion were identified. Cardiac surgery is consulted for consideration for evaluation.     A/P: Patient is a 67 year old male with acute chest pain found to have 6.5 cm ascending aortic aneurysm with concern for possible aortic dissection    -diligent BP control, will require arterial invasive monitoring, SBP goal 100-110  -STAT OLI to evaluate for possible Asc aortic flap and aortic valve insufficiency, tamponade  -type and cross  -NPO  -Will require emergent repair if evidence of dissection, if not plan observation and would require elective repair for aneurysm if symptoms improve  -patient seen and imaging reviewed with Dr. Toledo    Thank you for the opportunity to participate in the care of this patient.    Will Lopez MD  Cardiothoracic Surgery Fellow  [phone]      PMH:  Past Medical History:   Diagnosis Date     BPH (benign prostatic hyperplasia)      GERD (gastroesophageal reflux disease)      [place] (hard of hearing)      Hyperlipidemia      Hypertension      Urinary frequency          PSH:  Past Surgical History:   Procedure Laterality Date     COLONOSCOPY       CYSTOSCOPY, TRANSURETHRAL RESECTION (TUR) PROSTATE, COMBINED N/A 12/12/2016    Procedure:  COMBINED CYSTOSCOPY, TRANSURETHRAL RESECTION (TUR) PROSTATE;  Surgeon: Derian Dash MD;  Location: [facility] OR     HEAD & NECK SURGERY      brain surgery after childhood trauma     HERNIA REPAIR       ORTHOPEDIC SURGERY           FH:  family history is not on file.      SH:  Never Tobacco use  Denies EtOH use  Denies Illicit drug use    Allergies:  No Known Allergies    Home Meds:    (Not in a hospital admission)    ROS:   Skin: negative  Eyes: negative  Ears/Nose/Throat: earache Headache, left facial pain, left neck pain  Respiratory: No shortness of breath  Cardiovascular: positive for negative, chest pain and exertional chest pain or pressure  Gastrointestinal: negative  Genitourinary: negative  Musculoskeletal: negative  Neurologic: negative  Psychiatric: negative  Hematologic/Lymphatic/Immunologic: negative  Endocrine: negative      Physical Exam:  Temp:  [96.6  F (35.9  C)] 96.6  F (35.9  C)  Heart Rate:  [56-76] 64  Resp:  [8-19] 8  BP: ()/(40-82) 93/71  SpO2:  [82 %-99 %] 95 %    Gen: NAD, resting comfortably in bed  Lungs: CTAB, non-labored breathing  CV: regular rhythm, normal rate, elevated JVD  Abd: soft, NT, ND  Ext: 2+ femoral/DP/PT pulses b/l, no edema, no varicose veins  Neuro: AOx3    Labs:  ABG No lab results found in last 7 days.  CBC  Recent Labs  Lab 02/13/18  1011 02/13/18  0957   WBC  --  11.9*   HGB 14.3 14.6   PLT  --  151     BMP  Recent Labs  Lab 02/13/18  1011 02/13/18  0957    141   POTASSIUM 4.0 4.0   CHLORIDE  --  106   CO2  --  26   BUN  --  20   CR  --  1.47*   GLC  --  140*     LFT  Recent Labs  Lab 02/13/18  0957   AST 35   ALT 47   ALKPHOS 70   BILITOTAL 0.8   ALBUMIN 4.0   INR 1.04     PancreasNo lab results found in last 7 days.    Trop 0.018    Imaging:  EKG: NSR, rate 70    CTA chest/Abd/Pel: fusiform 6.5 cm ascending aortic aneurysm, pericardial effusion

## 2018-02-13 NOTE — PHARMACY-ADMISSION MEDICATION HISTORY
Admission medication history interview status for the 2/13/2018  admission is complete. See EPIC admission navigator for prior to admission medications     Medication history source reliability:Good    Actions taken by pharmacist (provider contacted, etc):None     Additional medication history information not noted on PTA med list :None    Medication reconciliation/reorder completed by provider prior to medication history? No    Time spent in this activity: 15 minutes    Prior to Admission medications    Medication Sig Last Dose Taking? Auth Provider   Atorvastatin Calcium (LIPITOR PO) Take 10 mg by mouth daily 2/12/2018 at Unknown time Yes Reported, Patient   Calcium Citrate-Vitamin D (CALCIUM + D PO) Take 600 mg by mouth daily 2/12/2018 at Unknown time Yes Reported, Patient   Losartan Potassium (COZAAR PO) Take 50 mg by mouth daily 2/12/2018 at Unknown time Yes Reported, Patient   multivitamin, therapeutic with minerals (MULTI-VITAMIN) TABS tablet Take 1 tablet by mouth daily 2/12/2018 at Unknown time Yes Reported, Patient

## 2018-02-13 NOTE — ED PROVIDER NOTES
History     Chief Complaint:  Chest Pain     The history is provided by the patient.      Vincenzo Moss is a 67 year old male with history of HTN and HLD who presents with chest pain. The patient woke up this morning at 0330 with a headache that stretched across his forehead and went into his left ear. He went downstairs and took an Advil at 0630 and when he walked back up the stairs he broke out in a cold sweat and felt lightheaded. He continued to feel lightheaded and stumbled into his bathroom to rest. His headache slowly improved but he then developed a pain/tightness in his throat down to his central chest, prompting him to come into the ED. Here in the ED he complains of feeling lightheaded and continued chest pain rating 5/10 in severity in his chest, but his headache is gone. He also endorses some shortness of breath, numbness in his hands and face, and is visibly pale while resting on the bed. He denies any symptoms prior to going to bed last night.  He has had no neurologic symptoms such as blurry vision, facial droop, or focal weakness or numbness.  He denies any weakness, nausea, vomiting, cough, abdominal pain, back pain, or other medical concerns. His last bowel movement was this morning with dark brown stools (denies blood).    CARDIAC RISK FACTORS:  Sex:    Male  Tobacco:   Never  Hypertension:   Pos  Hyperlipidemia:  Pos  Diabetes:   Neg  Family History:  Pos. Father: MI    PE/DVT RISK FACTORS:  Sex:    Male  Hormones:   Neg  Tobacco:   Neg  Cancer:   Neg  Travel:   Neg  Surgery:   Neg  Other immobilization: Neg  Personal history:  Neg  Family history:  Neg     Allergies:  No known drug allergies      Medications:    Lipitor  Cozaar    Past Medical History:    BPH (benign prostatic hyperplasia)   GERD (gastroesophageal reflux disease)   [place] (hard of hearing)   Hyperlipidemia   Hypertension   Urinary frequency     Past Surgical History:    Colonoscopy  Cystoscopy, transurethral resection  Brain  surgery after trauma  Hernia repair  Orthopedic surgery    Family History:    MI    Social History:  Presents with spouse   Tobacco use: Never  Alcohol use: No  PCP: Michael Mcbride    Marital Status:        Review of Systems   Constitutional: Positive for diaphoresis.   Respiratory: Positive for shortness of breath. Negative for cough.    Cardiovascular: Positive for chest pain.   Gastrointestinal: Negative for abdominal pain, blood in stool, nausea and vomiting.   Musculoskeletal: Positive for gait problem. Negative for back pain.   Skin: Positive for pallor.   Neurological: Positive for light-headedness, numbness and headaches.   All other systems reviewed and are negative.    Physical Exam     Patient Vitals for the past 24 hrs:   BP Temp Temp src Heart Rate Resp SpO2 Weight   02/13/18 1250 116/73 - - 66 18 95 % -   02/13/18 1245 113/73 - - 67 18 95 % -   02/13/18 1236 - - - 65 10 95 % -   02/13/18 1235 106/64 - - - - - -   02/13/18 1230 112/61 - - 72 - 94 % -   02/13/18 1225 118/71 - - 67 - 95 % -   02/13/18 1220 109/74 - - - - - -   02/13/18 1215 107/67 - - - - - -   02/13/18 1210 114/71 - - - - - -   02/13/18 1205 104/66 - - - - - -   02/13/18 1200 111/82 - - 66 11 93 % -   02/13/18 1155 108/69 - - 61 12 92 % -   02/13/18 1150 109/66 - - - - - -   02/13/18 1145 111/61 - - 62 15 94 % -   02/13/18 1140 106/65 - - - - - -   02/13/18 1135 106/72 - - 61 16 95 % -   02/13/18 1133 110/68 - - 64 11 94 % -   02/13/18 1131 101/66 - - - - - -   02/13/18 1120 103/69 - - - - - -   02/13/18 1115 101/66 - - - - - -   02/13/18 1100 (!) 81/61 - - 57 - 93 % -   02/13/18 1055 (!) 78/58 - - - - - -   02/13/18 1050 (!) 75/58 - - 59 11 95 % -   02/13/18 1045 (!) 75/59 - - 64 17 97 % -   02/13/18 1035 (!) 76/56 96.6  F (35.9  C) Temporal 56 19 97 % -   02/13/18 1030 (!) 63/48 - - 57 - 96 % -   02/13/18 1025 (!) 61/40 - - 63 19 98 % -   02/13/18 1017 - - - - - - 99.8 kg (220 lb)   02/13/18 1015 (!) 60/42 - - - - - -    02/13/18 1000 - - - 66 15 98 % -   02/13/18 0954 - - - - - 98 % -   02/13/18 0952 - - - - - 99 % -   02/13/18 0951 (!) 78/55 - - - 18 99 % -   02/13/18 0949 (!) 76/51 - - - - 95 % -   02/13/18 0947 - - - - - 92 % -   02/13/18 0946 (!) 79/57 - - - - - -      Physical Exam  Nursing note and vitals reviewed.    Constitutional:  Appears pale, not diaphoretic. Appearing stated age.   HENT:    No evidence of facial or scalp trauma.  No external ear deformity or discharge.     Nose normal.  No discharge.      Oropharynx is clear and moist.  No tenderness over the trachea.     No carotid bruits , no carotid tenderness.  Eyes:    Conjunctivae are normal without injection. No lid droop.     Pupils are equal, round, and reactive to light.      Right eye exhibits no discharge. Left eye exhibits no discharge.      No scleral icterus.   Cardiovascular:  Normal rate, regular rhythm with normal S1 and S2.  No JVD.     Normal heart sounds and peripheral pulses are weak, capillary refill 2 seconds.       No murmur or héctor.     No palpable pulsatile mass in abdomen.  Pulmonary/Respiratory:     Effort normal and breath sounds clear to auscultation bilaterally. No respiratory distress.  No stridor.     No wheezes. No rales. No chest wall tenderness.    GI:    Soft. No distension and no mass. No tenderness.      No rebound and no guarding. No flank pain.  No HSM.  Musculoskeletal:  Normal range of motion. No extremity deformity.     No edema and no tenderness.  No cyanosis.                                       Neck supple, no midline cervical tenderness.   Neurological:   Alert and oriented.      Exhibits good muscle tone. Coordination normal.      GCS eye subscore is 4. GCS verbal subscore is 5.      GCS motor subscore is 6.      No hand drift.  Lower extremity strength is grossly normal.     No focal weakness. No facial droop or facial numbness.  No cranial nerve deficit.   Skin:    Pale.  Skin is warm and dry. No rash noted. No  diaphoresis.      No erythema. No lesions.  Psychiatric:   Behavior is normal. Appropriate mood and affect.     Judgment and thought content normal.     Emergency Department Course   ECG (9:45:40):  Rate 72 bpm. OK interval 194. QRS duration 82. QT/QTc 404/442. P-R-T axes 58 22 39. Normal sinus rhythm. Normal ECG. Agree with computer interpretation. Interpreted at 0950 by Vandana Messer MD.     ECG (12:27:53):  Rate 70 bpm. OK interval 184. QRS duration 82. QT/QTc 392/423. P-R-T axes 60 16 41. Normal sinus rhythm. Normal ECG. Agree with computer interpretation. No significant change when compared to EKG dated 2/13/18. Interpreted at 1230 by Vandana Messer MD.     Imaging:  Radiographic findings were communicated with the patient and family who voiced understanding of the findings.  CT Head w/o Contrast  IMPRESSION:  Normal head CT.     CT Aortic Survey w Contrast  IMPRESSION:  1. Fusiform ascending thoracic aortic aneurysm measuring up to 6.5 cm.  2. Pericardial effusion.  3. Probable small cyst left lobe of liver.  4. Thickening of the gallbladder wall could indicate cholecystitis and ultrasound exam is recommended.  5. Sclerotic focus posterior left iliac bone.    FINDINGS:  Chest: There is a fusiform ascending thoracic aortic aneurysm measuring 6.5 cm maximum transverse diameter. Descending thoracic aorta is of normal caliber with mild tortuosity. There is no CT evidence for intimal dissection. There is a pericardial effusion measuring 1.2 cm in thickness, predominantly anterior. Mediastinal and hilar structures otherwise appear normal. However, pulmonary arteries are not well opacified with contrast and this study is not sufficient for diagnosis or exclusion of pulmonary emboli. The lungs are clear bilaterally.    Head/neck angiogram CT w & w/o Contrast  IMPRESSION: Normal neck and head CTA.    Preliminary reading per radiology.      Imaging independently reviewed and agree with radiologist  interpretation.     Laboratory:  ISTAT electrolytes POCT: , Potassium 4.0, HGB 14.3, Hematocrit - POCT 42  CBC: WBC 11.9 (H), o/w WNL (HGB 14.6, )    CMP: Creatinine 1.47 (H), Glucose 140 (H), GFR 48 (L), o/w WNL   INR: 1.04  PTT: 27  Troponin POCT: 0.00  1055: Troponin: <0.015   1305: Troponin: 0.018  D-dimer: 0.4   Lactic Acid: 2.3 (H)   Repeat Lactic Acid: 2.6 (H)   Blood culture: Pending  Blood culture: Pending    ABO/Rh type and screen: Type O, Rh Pos, Antibody Neg    Interventions:  1015: Zofran 4mg IV injection    1018: NS 1L IV Bolus   1031: NS 1L IV Bolus   1052: Levophed 0.15 mcg/kg/min x 99.8 kg  1057: Zosyn 4.5 mg IV  1334: Vancomycin 1500 mg IV    Emergency Department Course:  Past medical records, nursing notes, and vitals reviewed.  0953: I performed an exam of the patient and obtained history, as documented above.    1019: I rechecked the patient. Explained findings to patient and spouse.   1215: I rechecked the patient and updated him and his wife on findings.  1258: Findings and plan explained to the Patient and spouse who consents to admission.   1302: I discussed the patient with the PA-C for Dr. Toledo of Thoracic Surgery.  1311: I discussed the patient with Dr. Toledo of Thoracic Surgery here in the ED.     1315: Discussed the patient with Dr. Sanchez, who will admit the patient to an ICU bed for further monitoring, evaluation, and treatment.      I personally reviewed the laboratory results with the Patient and spouse and answered all related questions prior to admit.   Impression & Plan    CMS Diagnoses: Lactate is greater than 2 due to cardiogenic shock and not infection, at this time there is no sign of severe sepsis or septic shock.    Medical Decision Making:  Patient comes in after having a headache during the night that gradually moved down to his neck and upper chest.  No radiation into his back.  Neurologically he is intact and normal.  I was very concerned about his  clinical status when he came in because his blood pressure was 79 systolic, he was moved to a stable room and a large bore IV was placed antecubital he, he was given a liter of fluids and labs were obtained.  I was concerned about the possibility of an aneurysm rupture and/or dissection.  After a liter of fluids his blood pressure still remained in the 70s and so I decided to have a second large-bore IV placed and started him on norepinephrine peripherally.  I could not send him down to CT because of his low blood pressures and being unstable.  His blood pressure responded nicely, coming up over 100 systolic.  He had received 2 L of fluids.  His lab work came back with a normal CBC other than a white count elevation of 11.9 with hemoglobin of 14.6.  I was concerned initially about a bleed and loss of blood because he was pale but his hemoglobin came back normal.  I had type and crossed him for 2 units of blood.  He had not reported any dark stools recently and had no abdominal pain.  His comprehensive metabolic panel showed renal failure with a GFR of 48 and creatinine of 1.47, electrolytes were otherwise normal and coagulation studies are normal.  Troponin and d-dimer are normal.  Lactic acid was elevated at 2.3, blood cultures were obtained and he was given a dose of Zosyn IV although I do not suspect infection as the cause for his hypotension and elevated lactate.  I am more concerned about a cardiogenic shock secondary to a vascular issue.  He did not have JVD and so initially I did not suspect cardiac tamponade.  He was able to go down for that head CT and angiogram of the head and neck, they all came back normal.  No dissection or bleed.  However the aorta survey came back showing a 6.5 cm ascending aorta aneurysm.  There was also a pericardial effusion.  This heightened my concern for cardiac tamponade.  I consulted with Dr. Toledo from cardiothoracic surgery and he came right down promptly to see the  patient and agreed.  Patient's blood pressure has remained stable and I talked with the intensivist Dr. Sanchez who wants the patient up to the intensive care unit right away to get a stat cardiac echo and OLI.  Cardiology will be consulted as well.  Second troponin was obtained and a second EKG to make sure there was not an occult MI and both remain normal.  I chose not to put a central line and as he had 2 large-bore peripheral IVs and the patient needs to go to the intensive care unit at this time.  Dr. Sanchez was fine with this and will place any lines that are necessary when he gets there.      Critical Care time:  was 120 minutes for this patient excluding procedures.    Diagnosis:    ICD-10-CM   1. Cardiogenic shock (H) R57.0   2. Thoracic ascending aortic aneurysm (H) I71.2   3. Pericardial effusion I31.3    Possible tamponade       Disposition:  Admitted to Dr. Sanchez for further evaluation and treatment.  Stat cardiac echo, OLI, may need a pericardial window and/or thoracic surgery to repair the aneurysm.  He is on a low-dose of Levophed at this time maintaining his systolic blood pressure above 100.       Lewis Eden  2/13/2018    EMERGENCY DEPARTMENT  ILewis, am serving as a scribe at 9:53 AM on 2/13/2018 to document services personally performed by Vandana Messer MD based on my observations and the provider's statements to me.       Vandana Messer MD  02/13/18 [id number]

## 2018-02-13 NOTE — ANESTHESIA PROCEDURE NOTES
CENTRAL LINE INSERTION PROCEDURE NOTE:   Pre-Procedure  Performed by ANNETTE JONES  Location: OR      Pre-Anesthestic Checklist: patient identified, risks and benefits discussed and informed consent    Timeout  Correct Patient: Yes   Correct Procedure: Yes   Correct Site: Yes   Correct Laterality: N/A   Correct Position: Yes   Site Marked: N/A   .   Procedure Documentation   Procedure: new line (Introducer and PA Catheter)  Position: Trendelenburg  Patient Prep;all elements of maximal sterile barrier technique followed, mask, hat, sterile gown, sterile gloves, draped, hand hygiene, chlorhexidine gluconate and isopropyl alcohol, patient draped  Diagnosis:aortic dissection    Insertion Site:right, internal jugular  Using U/S with sterile probe cover and sterile gel, vein evaluated for patency/adequacy of cortis insertion is adequate, and using realtime U/S imaging the berta was punctured, and needle was observed entering vein on U/S. A permanent image is entered into the patient's record.   Skin infiltrated with 3 mL of 1% lidocaine.  Catheter: PA Catheter  Assessment/Narrative         Secured by suture  Tegaderm and Biopatch dressing used.  blood aspirated from all lumens  All lumens flushed: Yes  Verification method: Placement to be verified post-op  Comments:  PA Catheter  No complications  Patient tolerated procedure well  Line placed in operating room, prior to induction

## 2018-02-13 NOTE — IP AVS SNAPSHOT
[facility] [address] [facility]    [address] [place] [address]    Phone:  [phone]                                       After Visit Summary   2/13/2018    Vincenzo Moss    MRN: [id number]           After Visit Summary Signature Page     I have received my discharge instructions, and my questions have been answered. I have discussed any challenges I see with this plan with the nurse or doctor.    ..........................................................................................................................................  Patient/Patient Representative Signature      ..........................................................................................................................................  Patient Representative Print Name and Relationship to Patient    ..................................................               ................................................  Date                                            Time    ..........................................................................................................................................  Reviewed by Signature/Title    ...................................................              ..............................................  Date                                                            Time

## 2018-02-13 NOTE — ANESTHESIA POSTPROCEDURE EVALUATION
Patient: Vincenzo Moss    Procedure(s):   - Wound Class: I-Clean    Diagnosis:AORTIC ANEURESYM   Diagnosis Additional Information: No value filed.    Anesthesia Type:  General, ETT    Note:  Anesthesia Post Evaluation    Patient location during evaluation: PACU  Patient participation: Able to fully participate in evaluation  Level of consciousness: awake  Pain management: adequate  Airway patency: patent  Cardiovascular status: acceptable  Respiratory status: acceptable  Hydration status: acceptable  PONV: none     Anesthetic complications: None          Last vitals:  Vitals:    02/13/18 1503 02/13/18 1505 02/13/18 1510   BP: 123/80 101/86    Pulse:      Resp: 14 18 (!) 7   Temp:      SpO2: 98% 98% 99%         Electronically Signed By: Raquel Rollins MD, MD  February 13, 2018  5:31 PM

## 2018-02-13 NOTE — PROGRESS NOTES
1505 assisted with urgent candelario in icu, pt consented and time out done 1436.pt given total of 6 mg versed and 100 mcg fentanyl iv for sedation per dr roe. Pt tolerated well. Back to icu rn and team.

## 2018-02-14 ENCOUNTER — APPOINTMENT (OUTPATIENT)
Dept: GENERAL RADIOLOGY | Facility: CLINIC | Age: 68
DRG: 219 | End: 2018-02-14
Attending: PHYSICIAN ASSISTANT
Payer: MEDICARE

## 2018-02-14 LAB
ANION GAP SERPL CALCULATED.3IONS-SCNC: 6 MMOL/L (ref 3–14)
APTT PPP: 52 SEC (ref 22–37)
BASE DEFICIT BLDA-SCNC: 0.4 MMOL/L
BASE DEFICIT BLDA-SCNC: 0.5 MMOL/L
BASE DEFICIT BLDA-SCNC: 2.1 MMOL/L
BLD PROD TYP BPU: NORMAL
BLD UNIT ID BPU: 0
BLOOD PRODUCT CODE: NORMAL
BPU ID: NORMAL
BUN SERPL-MCNC: 23 MG/DL (ref 7–30)
CA-I BLD-MCNC: 4.4 MG/DL (ref 4.4–5.2)
CALCIUM SERPL-MCNC: 7.2 MG/DL (ref 8.5–10.1)
CHLORIDE SERPL-SCNC: 113 MMOL/L (ref 94–109)
CO2 SERPL-SCNC: 25 MMOL/L (ref 20–32)
CREAT SERPL-MCNC: 1.24 MG/DL (ref 0.66–1.25)
ERYTHROCYTE [DISTWIDTH] IN BLOOD BY AUTOMATED COUNT: 14.3 % (ref 10–15)
GFR SERPL CREATININE-BSD FRML MDRD: 58 ML/MIN/1.7M2
GLUCOSE BLDC GLUCOMTR-MCNC: 117 MG/DL (ref 70–99)
GLUCOSE BLDC GLUCOMTR-MCNC: 118 MG/DL (ref 70–99)
GLUCOSE BLDC GLUCOMTR-MCNC: 118 MG/DL (ref 70–99)
GLUCOSE BLDC GLUCOMTR-MCNC: 120 MG/DL (ref 70–99)
GLUCOSE BLDC GLUCOMTR-MCNC: 122 MG/DL (ref 70–99)
GLUCOSE BLDC GLUCOMTR-MCNC: 124 MG/DL (ref 70–99)
GLUCOSE BLDC GLUCOMTR-MCNC: 125 MG/DL (ref 70–99)
GLUCOSE BLDC GLUCOMTR-MCNC: 129 MG/DL (ref 70–99)
GLUCOSE BLDC GLUCOMTR-MCNC: 133 MG/DL (ref 70–99)
GLUCOSE BLDC GLUCOMTR-MCNC: 145 MG/DL (ref 70–99)
GLUCOSE BLDC GLUCOMTR-MCNC: 153 MG/DL (ref 70–99)
GLUCOSE BLDC GLUCOMTR-MCNC: 159 MG/DL (ref 70–99)
GLUCOSE SERPL-MCNC: 119 MG/DL (ref 70–99)
HCO3 BLD-SCNC: 23 MMOL/L (ref 21–28)
HCO3 BLD-SCNC: 24 MMOL/L (ref 21–28)
HCO3 BLD-SCNC: 25 MMOL/L (ref 21–28)
HCT VFR BLD AUTO: 30.3 % (ref 40–53)
HGB BLD-MCNC: 10.3 G/DL (ref 13.3–17.7)
INR PPP: 0.93 (ref 0.86–1.14)
LACTATE BLD-SCNC: 2.4 MMOL/L (ref 0.7–2)
MAGNESIUM SERPL-MCNC: 1.9 MG/DL (ref 1.6–2.3)
MCH RBC QN AUTO: 30.7 PG (ref 26.5–33)
MCHC RBC AUTO-ENTMCNC: 34 G/DL (ref 31.5–36.5)
MCV RBC AUTO: 90 FL (ref 78–100)
OXYHGB MFR BLD: 94 % (ref 92–100)
OXYHGB MFR BLD: 94 % (ref 92–100)
PCO2 BLD: 36 MM HG (ref 35–45)
PCO2 BLD: 40 MM HG (ref 35–45)
PCO2 BLD: 40 MM HG (ref 35–45)
PH BLD: 7.37 PH (ref 7.35–7.45)
PH BLD: 7.39 PH (ref 7.35–7.45)
PH BLD: 7.42 PH (ref 7.35–7.45)
PHOSPHATE SERPL-MCNC: 2.1 MG/DL (ref 2.5–4.5)
PLATELET # BLD AUTO: 80 10E9/L (ref 150–450)
PO2 BLD: 68 MM HG (ref 80–105)
PO2 BLD: 71 MM HG (ref 80–105)
PO2 BLD: 80 MM HG (ref 80–105)
POTASSIUM SERPL-SCNC: 4 MMOL/L (ref 3.4–5.3)
RBC # BLD AUTO: 3.35 10E12/L (ref 4.4–5.9)
SODIUM SERPL-SCNC: 144 MMOL/L (ref 133–144)
TRANSFUSION STATUS PATIENT QL: NORMAL
WBC # BLD AUTO: 8.2 10E9/L (ref 4–11)

## 2018-02-14 PROCEDURE — 99207 ZZC APP CREDIT; MD BILLING SHARED VISIT: CPT | Performed by: INTERNAL MEDICINE

## 2018-02-14 PROCEDURE — 83605 ASSAY OF LACTIC ACID: CPT | Performed by: PHYSICIAN ASSISTANT

## 2018-02-14 PROCEDURE — 25000132 ZZH RX MED GY IP 250 OP 250 PS 637: Mod: GY | Performed by: PHYSICIAN ASSISTANT

## 2018-02-14 PROCEDURE — 84100 ASSAY OF PHOSPHORUS: CPT | Performed by: PHYSICIAN ASSISTANT

## 2018-02-14 PROCEDURE — 25000125 ZZHC RX 250: Performed by: THORACIC SURGERY (CARDIOTHORACIC VASCULAR SURGERY)

## 2018-02-14 PROCEDURE — 82803 BLOOD GASES ANY COMBINATION: CPT | Performed by: SURGERY

## 2018-02-14 PROCEDURE — 93010 ELECTROCARDIOGRAM REPORT: CPT | Performed by: INTERNAL MEDICINE

## 2018-02-14 PROCEDURE — A9270 NON-COVERED ITEM OR SERVICE: HCPCS | Mod: GY | Performed by: PHYSICIAN ASSISTANT

## 2018-02-14 PROCEDURE — 99291 CRITICAL CARE FIRST HOUR: CPT | Performed by: INTERNAL MEDICINE

## 2018-02-14 PROCEDURE — 25000132 ZZH RX MED GY IP 250 OP 250 PS 637: Mod: GY

## 2018-02-14 PROCEDURE — 85730 THROMBOPLASTIN TIME PARTIAL: CPT | Performed by: PHYSICIAN ASSISTANT

## 2018-02-14 PROCEDURE — 83735 ASSAY OF MAGNESIUM: CPT | Performed by: PHYSICIAN ASSISTANT

## 2018-02-14 PROCEDURE — 94645 CONT INHLJ TX EACH ADDL HOUR: CPT

## 2018-02-14 PROCEDURE — 93005 ELECTROCARDIOGRAM TRACING: CPT

## 2018-02-14 PROCEDURE — 71045 X-RAY EXAM CHEST 1 VIEW: CPT

## 2018-02-14 PROCEDURE — 00000146 ZZHCL STATISTIC GLUCOSE BY METER IP

## 2018-02-14 PROCEDURE — 25000131 ZZH RX MED GY IP 250 OP 636 PS 637: Mod: GY | Performed by: PHYSICIAN ASSISTANT

## 2018-02-14 PROCEDURE — 25000128 H RX IP 250 OP 636: Performed by: THORACIC SURGERY (CARDIOTHORACIC VASCULAR SURGERY)

## 2018-02-14 PROCEDURE — 80048 BASIC METABOLIC PNL TOTAL CA: CPT | Performed by: PHYSICIAN ASSISTANT

## 2018-02-14 PROCEDURE — 25000125 ZZHC RX 250: Performed by: PHYSICIAN ASSISTANT

## 2018-02-14 PROCEDURE — 20000003 ZZH R&B ICU

## 2018-02-14 PROCEDURE — 40000275 ZZH STATISTIC RCP TIME EA 10 MIN

## 2018-02-14 PROCEDURE — 82330 ASSAY OF CALCIUM: CPT | Performed by: PHYSICIAN ASSISTANT

## 2018-02-14 PROCEDURE — 25000128 H RX IP 250 OP 636: Performed by: INTERNAL MEDICINE

## 2018-02-14 PROCEDURE — A9270 NON-COVERED ITEM OR SERVICE: HCPCS | Mod: GY

## 2018-02-14 PROCEDURE — 94003 VENT MGMT INPAT SUBQ DAY: CPT

## 2018-02-14 PROCEDURE — 85610 PROTHROMBIN TIME: CPT | Performed by: PHYSICIAN ASSISTANT

## 2018-02-14 PROCEDURE — 82805 BLOOD GASES W/O2 SATURATION: CPT | Performed by: PHYSICIAN ASSISTANT

## 2018-02-14 PROCEDURE — 85027 COMPLETE CBC AUTOMATED: CPT | Performed by: PHYSICIAN ASSISTANT

## 2018-02-14 PROCEDURE — 94644 CONT INHLJ TX 1ST HOUR: CPT

## 2018-02-14 PROCEDURE — 25000128 H RX IP 250 OP 636: Performed by: PHYSICIAN ASSISTANT

## 2018-02-14 RX ORDER — METOPROLOL TARTRATE 25 MG/1
25 TABLET, FILM COATED ORAL 2 TIMES DAILY
Status: DISCONTINUED | OUTPATIENT
Start: 2018-02-14 | End: 2018-02-16

## 2018-02-14 RX ORDER — LOSARTAN POTASSIUM 25 MG/1
25 TABLET ORAL DAILY
Status: DISCONTINUED | OUTPATIENT
Start: 2018-02-14 | End: 2018-02-15

## 2018-02-14 RX ORDER — ATORVASTATIN CALCIUM 10 MG/1
10 TABLET, FILM COATED ORAL DAILY
Status: DISCONTINUED | OUTPATIENT
Start: 2018-02-14 | End: 2018-02-19 | Stop reason: HOSPADM

## 2018-02-14 RX ORDER — METOPROLOL TARTRATE 25 MG/1
25 TABLET, FILM COATED ORAL 2 TIMES DAILY
Status: DISCONTINUED | OUTPATIENT
Start: 2018-02-14 | End: 2018-02-14

## 2018-02-14 RX ORDER — METOPROLOL TARTRATE 1 MG/ML
5 INJECTION, SOLUTION INTRAVENOUS EVERY 6 HOURS PRN
Status: DISCONTINUED | OUTPATIENT
Start: 2018-02-14 | End: 2018-02-16

## 2018-02-14 RX ORDER — NITROGLYCERIN 20 MG/100ML
0.07-2 INJECTION INTRAVENOUS CONTINUOUS
Status: DISCONTINUED | OUTPATIENT
Start: 2018-02-14 | End: 2018-02-16

## 2018-02-14 RX ORDER — PANTOPRAZOLE SODIUM 40 MG/1
40 TABLET, DELAYED RELEASE ORAL EVERY MORNING
Status: DISCONTINUED | OUTPATIENT
Start: 2018-02-15 | End: 2018-02-15

## 2018-02-14 RX ADMIN — POTASSIUM CHLORIDE 20 MEQ: 29.8 INJECTION, SOLUTION INTRAVENOUS at 09:14

## 2018-02-14 RX ADMIN — CEFAZOLIN SODIUM 2 G: 2 INJECTION, SOLUTION INTRAVENOUS at 00:57

## 2018-02-14 RX ADMIN — SODIUM PHOSPHATE, MONOBASIC, MONOHYDRATE 15 MMOL: 276; 142 INJECTION, SOLUTION INTRAVENOUS at 09:14

## 2018-02-14 RX ADMIN — HYDROMORPHONE HYDROCHLORIDE 0.5 MG: 1 INJECTION, SOLUTION INTRAMUSCULAR; INTRAVENOUS; SUBCUTANEOUS at 08:28

## 2018-02-14 RX ADMIN — HYDROMORPHONE HYDROCHLORIDE 0.5 MG: 1 INJECTION, SOLUTION INTRAMUSCULAR; INTRAVENOUS; SUBCUTANEOUS at 06:20

## 2018-02-14 RX ADMIN — PROPOFOL 40 MCG/KG/MIN: 10 INJECTION, EMULSION INTRAVENOUS at 03:12

## 2018-02-14 RX ADMIN — HYDROMORPHONE HYDROCHLORIDE 0.5 MG: 1 INJECTION, SOLUTION INTRAMUSCULAR; INTRAVENOUS; SUBCUTANEOUS at 12:57

## 2018-02-14 RX ADMIN — Medication 5 MG/HR: at 00:10

## 2018-02-14 RX ADMIN — VANCOMYCIN HYDROCHLORIDE 1500 MG: 5 INJECTION, POWDER, LYOPHILIZED, FOR SOLUTION INTRAVENOUS at 14:19

## 2018-02-14 RX ADMIN — CEFAZOLIN SODIUM 2 G: 2 INJECTION, SOLUTION INTRAVENOUS at 08:55

## 2018-02-14 RX ADMIN — MUPIROCIN 0.5 G: 20 OINTMENT TOPICAL at 20:29

## 2018-02-14 RX ADMIN — VANCOMYCIN HYDROCHLORIDE 1500 MG: 5 INJECTION, POWDER, LYOPHILIZED, FOR SOLUTION INTRAVENOUS at 02:22

## 2018-02-14 RX ADMIN — ACETAMINOPHEN 975 MG: 325 TABLET, FILM COATED ORAL at 23:53

## 2018-02-14 RX ADMIN — Medication 2 G: at 07:46

## 2018-02-14 RX ADMIN — Medication 7.5 MG/HR: at 00:27

## 2018-02-14 RX ADMIN — METOPROLOL TARTRATE 25 MG: 25 TABLET ORAL at 17:38

## 2018-02-14 RX ADMIN — HYDROMORPHONE HYDROCHLORIDE 0.5 MG: 1 INJECTION, SOLUTION INTRAMUSCULAR; INTRAVENOUS; SUBCUTANEOUS at 03:12

## 2018-02-14 RX ADMIN — HYDROMORPHONE HYDROCHLORIDE 0.5 MG: 1 INJECTION, SOLUTION INTRAMUSCULAR; INTRAVENOUS; SUBCUTANEOUS at 22:08

## 2018-02-14 RX ADMIN — ATORVASTATIN CALCIUM 10 MG: 10 TABLET, FILM COATED ORAL at 18:29

## 2018-02-14 RX ADMIN — HYDROMORPHONE HYDROCHLORIDE 0.5 MG: 1 INJECTION, SOLUTION INTRAMUSCULAR; INTRAVENOUS; SUBCUTANEOUS at 15:26

## 2018-02-14 RX ADMIN — MUPIROCIN 0.5 G: 20 OINTMENT TOPICAL at 10:45

## 2018-02-14 RX ADMIN — NITROGLYCERIN 0.1 MCG/KG/MIN: 20 INJECTION INTRAVENOUS at 00:30

## 2018-02-14 RX ADMIN — PROPOFOL 10 MCG/KG/MIN: 10 INJECTION, EMULSION INTRAVENOUS at 08:53

## 2018-02-14 RX ADMIN — PANTOPRAZOLE SODIUM 40 MG: 40 INJECTION, POWDER, FOR SOLUTION INTRAVENOUS at 10:45

## 2018-02-14 RX ADMIN — Medication 5 MG/HR: at 15:22

## 2018-02-14 RX ADMIN — SODIUM CHLORIDE 2 UNITS/HR: 9 INJECTION, SOLUTION INTRAVENOUS at 17:47

## 2018-02-14 RX ADMIN — CEFAZOLIN SODIUM 2 G: 2 INJECTION, SOLUTION INTRAVENOUS at 16:35

## 2018-02-14 RX ADMIN — LOSARTAN POTASSIUM 25 MG: 25 TABLET ORAL at 18:29

## 2018-02-14 ASSESSMENT — PAIN DESCRIPTION - DESCRIPTORS: DESCRIPTORS: ACHING

## 2018-02-14 NOTE — PROGRESS NOTES
[facility] Intensive Care Progress Note    Date of Service (when I saw the patient): 02/14/2018    PROBLEM LIST:  1. Ascending aortic aneurysm-s/p repair 2/13/18  2. Pericardial effusion/tamponade associated with above  3. Mild JARED      Past 24 Hrs:  Returned from OR. Emergent type A aortic dissection repair. No AVR.     Assessment & Plan    67 year old male who was admitted on 2/13/2018 for the above.    Neurology    Plan:    Cardiovascular  1. Type A aneurysm repair  2. Pericardial effusion with tamponade  Maintain SBP < 110 per CVS  Plan:  Maintain SBP < 110 per CVS      Pulmonary  Never smoker  ETT deep-at nicole  Plan:  1. Pull ETT back ~ 3 cm  2. Repeat ABG  3. Wean Fi02  4. Maintain intubated ON    Renal  Mild JARED  Plan:  1. Ensure adequate renal perfusion  2. Avoid nephrotoxic agents  3. Follow UOP/renal parameters      Plans for next 24 Hrs:  1. Pull ETT bacv/out 3 cm  2. Adjusted vent settings-wean Fi02  3. Leave Intubated ON    Krys Nixon MD  #[id number]    Time Spent on this Encounter   Billing:  I spent 30  minutes bedside and on the inpatient unit today managing the critical care of Vincenzo Moss in relation to the issues listed in this note.        Physical Exam   Temp: 96.1  F (35.6  C) Temp src: Bladder Temp  Min: 95.4  F (35.2  C)  Max: 96.6  F (35.9  C) BP: 101/86 Pulse: 94 Heart Rate: 73 Resp: (!) 7 SpO2: 100 % O2 Device: Mechanical Ventilator Oxygen Delivery: 4 LPM  Vitals:    02/13/18 1017   Weight: 99.8 kg (220 lb)       Ventilation Mode: CMV/AC  (Continuous Mandatory Ventilation/ Assist Control)  FiO2 (%): 80 %  Rate Set (breaths/minute): 16 breaths/min  Tidal Volume Set (mL): 600 mL  PEEP (cm H2O): 6 cmH2O  Oxygen Concentration (%): 80 %  Resp: 7  I/O last 3 completed shifts:  In: 4594 [I.V.:1800; Other:560]  Out: 485 [Urine:485]    GEN: Orally intubated  HEENT: PERRL, anicteric  CHEST: CTA bilaterally, no wheezing wound, incision site covered  CVS: HS  1 + S2   ABDO: Soft, ND, BS+  EXTREM: Trace LE edema  SKIN: No rashes  NEURO: sedated      Medications     nitroGLYcerin 0.1 mcg/kg/min (02/14/18 0030)     niCARdipine 40 mg in 200 mL 0.9% NaCl 5 mg/hr (02/14/18 0058)     Reason beta blocker order not selected       dextrose 5% and 0.45% NaCl + KCl 20 mEq/L 30 mL/hr at 02/13/18 2315     phenylephrine IV infusion ADULT       EPINEPHrine IV infusion ADULT       IV fluid REPLACEMENT ONLY       insulin (regular) 3 Units/hr (02/14/18 0010)     milrinone       vasopressin (PITRESSIN) infusion ADULT (40 mL)       propofol (DIPRIVAN) infusion 50 mcg/kg/min (02/13/18 2340)       sodium chloride (PF)  3 mL Intracatheter Q8H     insulin aspart   Subcutaneous TID w/meals     pantoprazole (PROTONIX) IV  40 mg Intravenous Daily     ceFAZolin  2 g Intravenous Q8H     mupirocin  0.5 g Both Nostrils BID     vancomycin (VANCOCIN) IV  1,500 mg Intravenous Q12H     aspirin  81 mg Oral Daily     acetaminophen  975 mg Oral Q8H       Data     Recent Labs  Lab 02/13/18 2200 02/13/18 2053 02/13/18 2050 02/13/18 2001   WBC 8.6  --  17.8* 18.9*   HGB 10.1* 7.5* 9.0* 10.7*   MCV 91  --  92 92   PLT 67*  --  118* 97*   INR 0.86  --  0.82* 1.65*   * 144 147* 144   POTASSIUM 4.0 3.2* 3.3* 4.3   CHLORIDE 112*  --  112* 110*   CO2 21  --  20 21   BUN 20  --  21 21   CR 1.35*  --  1.40* 1.39*   ANIONGAP 12  --  15* 13   TAMARA 7.2*  --  7.6* 7.2*   *  --  209* 178*   ALBUMIN  --   --   --   --    PROTTOTAL  --   --   --   --    BILITOTAL  --   --   --   --    ALKPHOS  --   --   --   --    ALT  --   --   --   --    AST  --   --   --   --    TROPI  --   --   --   --    TROPONIN  --   --   --   --    < > = values in this interval not displayed.  Recent Results (from the past 24 hour(s))   CT Head w/o Contrast   CT Aortic Survey w Contrast    Narrative    CT AORTIC SURVEY WITH CONTRAST 2/13/2018 12:04 PM    FINDINGS:    Chest: There is a fusiform ascending thoracic aortic  aneurysm  measuring 6.5 cm maximum transverse diameter. Descending thoracic  aorta is of normal caliber with mild tortuosity. There is no CT  evidence for intimal dissection. There is a pericardial effusion  measuring 1.2 cm in thickness, predominantly anterior. Mediastinal and  hilar structures otherwise appear normal. However, pulmonary arteries  are not well opacified with contrast and this study is not sufficient  for diagnosis or exclusion of pulmonary emboli. The lungs are clear  bilaterally.        Impression    IMPRESSION:  1. Fusiform ascending thoracic aortic aneurysm measuring up to 6.5 cm.  2. Pericardial effusion.  3. Probable small cyst left lobe of liver.  4. Thickening of the gallbladder wall could indicate cholecystitis and  ultrasound exam is recommended.  5. Sclerotic focus posterior left iliac bone.       Head/neck angiogram CT  w & w/o contrast    Narrative    CT ANGIOGRAM OF THE HEAD AND NECK WITHOUT AND WITH CONTRAST February 13, 2018 12:00 PM     HISTORY: Headache, lightheaded, neck pain, hypotensive.      Impression    IMPRESSION: Normal neck and head CTA.

## 2018-02-14 NOTE — ANESTHESIA CARE TRANSFER NOTE
Patient: Vincenzo Moss    Procedure(s):  TYPE A  ASCENDING AORTIC ANEURYSM REPAIR WITH INTERPOSITION GRAFT-HEMASHIELD PLATINUM WOVEN GRAFT D:34MM L:30MM  (ON PUMP/OLI) - Wound Class: I-Clean    Diagnosis: AORTIC ANEURESYM   Diagnosis Additional Information: No value filed.    Anesthesia Type:   General, ETT     Note:  Airway :ETT  Patient transferred to:ICU  ICU Handoff: Call for PAUSE to initiate/utilize ICU HANDOFF, Identified Patient, Identified Responsible Provider, Reviewed the Pertinent Medical History, Discussed Surgical Course, Reviewed Intra-OP Anesthesia Management and Issues during Anesthesia, Set Expectations for Post Procedure Period and Allowed Opportunity for Questions and Acknowledgement of Understanding      Vitals: (Last set prior to Anesthesia Care Transfer)    CRNA VITALS  2/13/2018 2105 - 2/13/2018 2154      2/13/2018             Resp Rate (set): 10                Electronically Signed By: RAIN Guevara CRNA  February 13, 2018  9:54 PM

## 2018-02-14 NOTE — PROGRESS NOTES
[facility]  Cardiovascular and Thoracic Surgery Daily Note          Assessment and Plan:   POD#1 s/p Emergent type A aortic dissection repair with a 34 mm Hemashield interposition graft, intraoperative OLI  -CVS: HR: 60s-80s. SBP: 90s-120s. Remains on nitro and nicardipine. ASA, BB, statin. Resumed PTA losartan. Wean gtts as able.   -Resp: Extubated within 24 hours of surgery. Saturating well on nasal cannula. Continue to encourage IS, cough, deep breathing, ambulation  -Neuro:  Grossly intact. Pain controlled.  -Renal: good UO, up about 7kg from preoperative weight. Will hold off diuresis today.   Creatinine   Date Value Ref Range Status   2018 1.24 0.66 - 1.25 mg/dL Final   ]  -GI: Tolerating diet, will advance Continue bowel regimen  -:  Miller in place d/t limited mobility and need for accurate I&Os.   -Endo: pre op A1c: 5.5. Continue Insulin gtt 48hrs.  -FEN: replete lytes as needed, ADAT. Na: 144. K: 4.0    Active Diet Order      Regular Diet Adult  -ID: Temp (24hrs), Av  F (37.2  C), Min:91.9  F (33.3  C), Max:101.3  F (38.5  C)   Completing perioperative abx.   WBC   Date Value Ref Range Status   2018 8.2 4.0 - 11.0 10e9/L Final   ]  -Heme: plt: 80. Acute blood loss anemia and thrombocytopenia. Will hold off on sub q heparin today.   Hemoglobin   Date Value Ref Range Status   2018 10.3 (L) 13.3 - 17.7 g/dL Final   ],   -Proph: PCD, ASA, BB, statin, PPI.  -Dispo: Continue ICU cares. Continue to wean gtts as able. Continue to encourage IS, cough, deep breathing, ambulation          Interval History:   Extubated today. Saturating well on nasal cannula. Weaning gtts as able. Pain controlled. Tolerating diet.          Medications:       metoprolol tartrate  25 mg Oral BID     losartan  25 mg Oral Daily     sodium chloride (PF)  3 mL Intracatheter Q8H     insulin aspart   Subcutaneous TID w/meals     pantoprazole (PROTONIX) IV  40 mg Intravenous Daily     ceFAZolin  2 g  "Intravenous Q8H     mupirocin  0.5 g Both Nostrils BID     aspirin  81 mg Oral Daily     acetaminophen  975 mg Oral Q8H     metoprolol, lidocaine (buffered or not buffered), lidocaine 4%, sodium chloride (PF), Reason beta blocker order not selected, albumin human, hydrALAZINE, insulin aspart, naloxone, potassium chloride, potassium chloride, potassium chloride, potassium chloride with lidocaine, potassium chloride, magnesium sulfate, magnesium sulfate, sodium phosphate, sodium phosphate, sodium phosphate, sodium phosphate, HYDROmorphone, [START ON 2/16/2018] acetaminophen, oxyCODONE IR, ondansetron **OR** ondansetron, prochlorperazine **OR** prochlorperazine, senna-docusate **OR** senna-docusate, IV fluid REPLACEMENT ONLY, glucose **OR** dextrose **OR** glucagon          Physical Exam:   Vitals were reviewed  Blood pressure 101/86, pulse 94, temperature 99  F (37.2  C), resp. rate 16, height 1.778 m (5' 10\"), weight 106.6 kg (235 lb 0.2 oz), SpO2 91 %.  Rhythm: NSR    Lungs: diminished bases    Cardiovascular: RRR normal s1 and s2    Abdomen: soft NTND    Extremeties: minimal edema    Incision: CDI    CT: to suction    Weight:   Vitals:    02/13/18 1017 02/14/18 0600   Weight: 99.8 kg (220 lb) 106.6 kg (235 lb 0.2 oz)            Data:   Labs:   Lab Results   Component Value Date    WBC 8.2 02/14/2018     Lab Results   Component Value Date    RBC 3.35 02/14/2018     Lab Results   Component Value Date    HGB 10.3 02/14/2018     Lab Results   Component Value Date    HCT 30.3 02/14/2018     No components found for: MCT  Lab Results   Component Value Date    MCV 90 02/14/2018     Lab Results   Component Value Date    MCH 30.7 02/14/2018     Lab Results   Component Value Date    MCHC 34.0 02/14/2018     Lab Results   Component Value Date    RDW 14.3 02/14/2018     Lab Results   Component Value Date    PLT 80 02/14/2018       Last Basic Metabolic Panel:  Lab Results   Component Value Date     02/14/2018      Lab " Results   Component Value Date    POTASSIUM 4.0 02/14/2018     Lab Results   Component Value Date    CHLORIDE 113 02/14/2018     Lab Results   Component Value Date    TAMARA 7.2 02/14/2018     Lab Results   Component Value Date    CO2 25 02/14/2018     Lab Results   Component Value Date    BUN 23 02/14/2018     Lab Results   Component Value Date    CR 1.24 02/14/2018     Lab Results   Component Value Date     02/14/2018       CXR: 2/14/18    IMPRESSION: Endotracheal tube has been pulled back into good position.  Central venous catheter with the tip in the right pulmonary artery.  Mediastinal tubes, cardiomegaly, left lower lobe atelectasis and  minimal right lower lobe atelectasis are unchanged.    Nicole Melara PA-C  CV Surgery  Pager #[phone]

## 2018-02-14 NOTE — PROGRESS NOTES
Pt extubated and placed on 70% aerosol.  Will continue to follow.  Jose Antonio Richey  2/14/2018

## 2018-02-14 NOTE — PROGRESS NOTES
[facility] ICU RESPIRATORY NOTE  Date of Admission: 2/13/18  Date of Intubation (most recent): 2/13/18  Reason for Mechanical Ventilation: AAA repair  Number of Days on Mechanical Ventilation: 2  Met Criteria for Pressure Support Trial:  Length of Pressure Support Trial:  Reason for Stopping Pressure Support Trial:  Reason for No Pressure Support Trial: Per MD    Significant Events Today: Pt up from OR     ABG Results:     Recent Labs  Lab 02/14/18  0205 02/13/18  2225 02/13/18 2005 02/13/18  1930   PH 7.37 7.38 7.33* 7.33*   PCO2 40 36 42 40   PO2 80 74* 315* 239*   HCO3 23 21 22 21         ETT appearance on chest x-ray: pulled back 3 cm, in good position now.       Plan:  Assess daily for wean    Antonio Sandoval RT

## 2018-02-14 NOTE — CONSULTS
CARDIAC SURGERY NUTRITION CONSULT    Received standing order to assess and educate patient    Patient inappropriate for instructions at this time    Will follow and complete assessment once patient is extubated and/or transferred to medical unit    Funmilayo Luong RD, LD, CNSC   Clinical Dietitian - [facility]

## 2018-02-14 NOTE — OP NOTE
Procedure Date: 02/13/2018      PREOPERATIVE DIAGNOSIS:  Acute type A aortic dissection and ascending aortic aneurysm.      POSTOPERATIVE DIAGNOSIS:  Acute type A aortic dissection and ascending aortic aneurysm.      SURGEON:  Naomie Toledo MD.      ASSISTANT: Will Lopez MD and  Concha Pappas PA-C.      NAME OF OPERATION:  Emergent type A aortic dissection repair with a 34 mm Hemashield interposition graft, resuspension of the aortic valve, intraoperative OLI.      ANESTHESIA:  General orotracheal.      INDICATIONS FOR PROCEDURE:  Mr. Moss is a very pleasant 67-year-old gentleman with no prior cardiac history who presented to the emergency room with complaints of chest pain/back pain that woke him up in the middle of the night at around 3:00 a.m.  CT angiogram of the chest was performed in the ER that demonstrated a 6.5 cm ascending aortic aneurysm with a moderate-size pericardial effusion but no definite aortic dissection flap was identified.  We were consulted in the ED and evaluated the patient emergently and viewed the CT angiogram.  Our concerns were high enough for an acute type A aortic dissection given the hemopericardium.      The patient was brought to the ICU and an emergent OLI was performed at the bedside.  This again demonstrated a large ascending aortic aneurysm.  His aortic valve was trileaflet and had mild to moderate insufficiency.  His LV function was good.  There was a hint of a possible dissection flap down at the sinotubular junction, but again no definite intimal tear was visualized.  My suspicion for an acute aortic dissection involving the ascending aorta was high enough that the patient was taken to the operating room for an emergent acute type A aortic dissection repair.      OPERATIVE FINDINGS:  The patient had a very large fusiform ascending aortic aneurysm that tapered off at the level of the innominate artery takeoff.  The aneurysm also tapered off at the level of the  sinotubular junction proximally.  He did have an intimal tear in the posterior wall of the mid ascending aorta right at the sinotubular junction and it extended all the way up to the distal ascending aorta, but not into the aortic arch.  This allowed us to crossclamp the aorta high and replace the entire ascending aorta without performing a deep hypothermic circulatory arrest.  We examined the aortic valve carefully.  The aortic valve was trileaflet, noncalcified and appeared grossly normal.  We therefore decided to resuspend the valve to spare the aortic valve.  His aortic root itself did not appear significantly dilated below the sinotubular junction.      DESCRIPTION OF PROCEDURE:  After informed consent was obtained, the patient was brought down to the operating room, and was placed on the OR table in a supine position.  Intravenous and intra-arterial lines were begun.  A Dunlo-Jefry catheter was also introduced.  While monitoring his blood pressure and EKG tracing he was anesthetized and intubated using a single lumen endotracheal tube.  His entire chest, abdomen, both groins and legs were circumferentially prepped down to the toes using multiple layers of DuraPrep.  He was draped in a sterile field.  A median sternotomy was performed and the patient was fully heparinized.  The sternal edges were retracted laterally and the pericardium was opened to suspend the heart in a pericardial cradle.  The patient has a significant amount of blood in his pericardium, which was definitely causing tamponade physiology.  Upon evacuating the hemopericardium, the patient's blood pressure significantly shot up.  There was clearly a significant amount of active bleeding from the aortic root.  We quickly cannulated the right common femoral artery by performing a cut down via a 5 cm transverse incision 1 cm parallel and above the inguinal crease.  The 5-0 pursestring suture was placed in the anterior surface of the right common  femoral artery and a 19-Azerbaijani femoral arterial cannula was placed using Seldinger technique and OLI guidance.  The SVC and IVC were then cannulated and the patient was placed on cardiopulmonary bypass.  A retrograde cardioplegia catheter was placed into the coronary sinus without difficulty.  An antegrade needle/aortic root vent was placed in the mid ascending aorta as well.  The patient was cooled down to 26 degrees Celsius.  The ascending aorta was dissected all the way down to the aortic root and up to the proximal aortic arch.  An LV vent was placed via the right superior pulmonary vein.  Carbon dioxide was flooded into mediastinal wound to prevent air embolism.  The aorta was eventually cross-clamped way up high at the level of the innominate artery takeoff and a liter of antegrade cold blood cardioplegia was given to arrest the heart.  The patient had significant leak from the aortic root and therefore, we were unable to completely arrest the heart using antegrade cardioplegia.  We therefore gave a liter of retrograde cardioplegia to fully arrest the heart.  The patient went into good diastolic arrest without any LV distention.  Following this, intermittent retrograde cardioplegia doses were given on average every 15 minutes for myocardial protection while the aorta was crossclamped.      The mid ascending aorta was transected.  We immediately identified a large intimal tear in the posterior surface of the mid ascending aorta as described above.  This tear went all the way down to the level of the sinotubular junction, but did not extend into the aortic root.  The tear extended vertically up towards the aortic arch, but again did not go into the aortic arch.  The ascending aorta was transected at the sinotubular junction and was trimmed all the way up to the aortic crossclamp.  This completely resected the aneurysmal segment and the intimal tear of the ascending aorta.  We then inspected the aortic valve.   Findings are noted above.  We decide to resuspend the aortic valve by placing pledgeted 4-0 Prolene horizontal mattress sutures at each of the 3 commissures.  We then decided to use a 34 mm Hemashield interposition graft to replace the ascending aorta.  The proximal graft to aorta anastomosis was performed in an end-to-end fashion using running 3-0 Prolene with outside felt reinforcement with BioGlue.  The graft was trimmed to the appropriate length and the graft to distal aorta anastomosis was performed in an end-to-end fashion using running 3-0 Prolene with outside felt and BioGlue reinforcement.  Next, using eye cautery, a small opening was created in the graft and aortic root was placed back in the graft for de-airing purposes.  A liter of warm blood cardioplegia was given as a retrograde hot shot and the aortic crossclamp was removed.  Aortic crossclamp time was 76 minutes.  The patient was rewarmed.  The left ventricle and the ascending aorta were continuously vented to de-air the heart.  The patient was eventually weaned off cardiopulmonary bypass with epinephrine and Levophed support.  Total cardiopulmonary bypass time was 138 minutes.  We had Cardiology come and do a formal OLI.  The LV function was good and the heart was adequately deaired and the aortic valve had trivial to at most very mild insufficiency.  Once the patient remained stable off bypass and the patient was rewarmed to 37 degrees Celsius, the venous cannula removed and protamine was administered.  The femoral arterial cannula was removed as well.  The femoral arterial cannulation site was closed using the existing 5-0 pursestring and a single 5-0 repair suture.  Hemostasis was good.  The groin incision was closed in layers of running Vicryl suture.  Skin was closed using 0 Vicryl and was sealed using Dermabond.  We then spent about an hour, obtaining hemostasis in the mediastinum.  A 32-Micronesian angled and two 32-Micronesian straight chest tubes  were placed in the mediastinum.  These were all brought out percutaneously below the sternotomy incision, secured to skin using 2-0 Ethibond.  Temporary ventricular pacing wires also placed in the RV muscle and were brought out percutaneously below the sternotomy incision and secured.  Both pleural spaces were slightly open.  The mediastinum was irrigated again and hemostasis was confirmed.  The sternums were reapproximated using multiple interrupted single and double wires.  The incision was closed in layers of running Vicryl suture.  Skin was closed using 3-0 Vicryl and was sealed using Dermabond.      There were no intraoperative complications and the patient tolerated the operation well.  Two units of FFP, 2 units of platelets and 1 pack of cryoprecipitate and 1 pack of packed red blood cells were given intraoperatively.  Factor VII was also given for hemostasis.  The sternum was reapproximated using multiple interrupted single and double wires.  The incision was closed in layers of running Vicryl suture.  Skin was closed using 3-0 Vicryl and was sealed using Dermabond.      There were no intraoperative complications and the patient tolerated the operation well.  All sponge counts, needle counts and instrument counts were correct x2 at the end the operation.  EBL unknown.  Specimen removed was the ascending aorta.      The patient was brought to the ICU in hemodynamically stable condition on low dose epinephrine drip and remained intubated.         DAMON TRISTAN MD             D: 2018   T: 2018   MT: HERB      Name:     FRED CLARK   MRN:      [id number]        Account:        [id number]   :      1950           Procedure Date: 2018      Document: [id number]

## 2018-02-14 NOTE — PLAN OF CARE
Problem: Cardiac Surgery/Open with Cardiopulmonary Bypass (Infant)  Goal: Signs and Symptoms of Listed Potential Problems Will be Absent, Minimized or Managed (Cardiac Surgery/Open with Cardiopulmonary Bypass)  Signs and symptoms of listed potential problems will be absent, minimized or managed by discharge/transition of care (reference Cardiac Surgery/Open with Cardiopulmonary Bypass (Infant) CPG).  Outcome: Improving  Pt out of OR at 2134 and arrived to ICU room 362. Pt medically sedated on propofol throughout shift, pt is spontaneously moving all extremities with purposeful movement but not following commands, pupils are pinpoint to 2mm equal. Dilaudid given for pain management.  Lung sounds are clear to diminished, vent changes made per Dr. Nixon, current settings CMV 16, tV 600, PEEP 8 and FiO2 60%, scant secretions per ETT and oral. Pt in SR on monitor throughout shift, no ectopy noted, pacer connected but off. Pt on nicardipine and nitro to maintain SBP . Chest tube output dark red and minimal. Urine output adequate. Insulin drip per protocol. Wife updated at bedside post op.

## 2018-02-14 NOTE — ANESTHESIA POSTPROCEDURE EVALUATION
Patient: Vincenzo Moss    Procedure(s):  TYPE A  ASCENDING AORTIC ANEURYSM REPAIR WITH INTERPOSITION GRAFT-HEMASHIELD PLATINUM WOVEN GRAFT D:34MM L:30MM  (ON PUMP/OLI) - Wound Class: I-Clean    Diagnosis:AORTIC ANEURESYM   Diagnosis Additional Information: No value filed.    Anesthesia Type:  General, ETT    Note:  Anesthesia Post Evaluation    Patient location during evaluation: ICU  Patient participation: Unable to participate in evaluation secondary to underlying medical condition  Post-procedure mental status: sedated.  Pain management: adequate  Airway patency: intubated.  Cardiovascular status: acceptable  Respiratory status: acceptable  Hydration status: acceptable  PONV: controlled     Anesthetic complications: None    Comments: Report to ICU MD and nurse.  /65, HR 78, SpO2 97% when I left the ICU.        Last vitals:  Vitals:    02/13/18 1505 02/13/18 1510 02/13/18 2147   BP: 101/86     Pulse:      Resp: 18 (!) 7    Temp:      SpO2: 98% 99% 96%         Electronically Signed By: Leila Dunbar MD  February 13, 2018  9:57 PM

## 2018-02-14 NOTE — PROVIDER NOTIFICATION
Dr. Nixon notified of PaO2 80 on CMV 16, tV 600, PEEP 6, FiO2 80%. Orders received to increase PEEP to 8 and titrate FiO2 to keep O2 sats >92%

## 2018-02-14 NOTE — PLAN OF CARE
Problem: Patient Care Overview  Goal: Plan of Care/Patient Progress Review  PT/CR: Order rec'd. Per discussion with nurse, patient not appropriate for eval today. Intubated and sedated.

## 2018-02-14 NOTE — BRIEF OP NOTE
[facility]    Brief Operative Note     Pre-operative diagnosis: Aortic dissection of the ascending aorta.   Post-operative diagnosis:  Aortic dissection of the ascending aorta.   Procedure: Procedure(s):  VALVE SPARING ASCENDING AORTIC ANEURYSM REPAIR WITH HEMASHIELD PLATINUM WOVEN GRAFT D:34MM L:30MM  (ON PUMP/OLI) - Wound Class: I-Clean  Surgeon: Surgeon(s) and Role:     * Naomie Toledo MD - Primary     * Will Lopez MD - Assisting     * Concha Pappas PA-C - Assisting  Anesthesia: General   Estimated blood loss: See anesthesia report   Drains: See official operative report  Specimens:   ID Type Source Tests Collected by Time Destination   A : ASCENDING AORTIC WALL Tissue Artery, Aortic SURGICAL PATHOLOGY EXAM Naomie Toledo MD 2/13/2018  7:00 PM      Findings:   See official operative report   Complications: See official operative report   Implants: See above

## 2018-02-15 ENCOUNTER — APPOINTMENT (OUTPATIENT)
Dept: PHYSICAL THERAPY | Facility: CLINIC | Age: 68
DRG: 219 | End: 2018-02-15
Attending: PHYSICIAN ASSISTANT
Payer: MEDICARE

## 2018-02-15 LAB
ALBUMIN SERPL-MCNC: 2.8 G/DL (ref 3.4–5)
ALP SERPL-CCNC: 40 U/L (ref 40–150)
ALT SERPL W P-5'-P-CCNC: 31 U/L (ref 0–70)
ANION GAP SERPL CALCULATED.3IONS-SCNC: 6 MMOL/L (ref 3–14)
AST SERPL W P-5'-P-CCNC: 40 U/L (ref 0–45)
BILIRUB SERPL-MCNC: 0.7 MG/DL (ref 0.2–1.3)
BLD PROD TYP BPU: NORMAL
BUN SERPL-MCNC: 24 MG/DL (ref 7–30)
CALCIUM SERPL-MCNC: 7.4 MG/DL (ref 8.5–10.1)
CHLORIDE SERPL-SCNC: 112 MMOL/L (ref 94–109)
CO2 SERPL-SCNC: 26 MMOL/L (ref 20–32)
COPATH REPORT: NORMAL
CREAT SERPL-MCNC: 0.97 MG/DL (ref 0.66–1.25)
ERYTHROCYTE [DISTWIDTH] IN BLOOD BY AUTOMATED COUNT: 14.8 % (ref 10–15)
GFR SERPL CREATININE-BSD FRML MDRD: 77 ML/MIN/1.7M2
GLUCOSE BLDC GLUCOMTR-MCNC: 101 MG/DL (ref 70–99)
GLUCOSE BLDC GLUCOMTR-MCNC: 101 MG/DL (ref 70–99)
GLUCOSE BLDC GLUCOMTR-MCNC: 102 MG/DL (ref 70–99)
GLUCOSE BLDC GLUCOMTR-MCNC: 104 MG/DL (ref 70–99)
GLUCOSE BLDC GLUCOMTR-MCNC: 105 MG/DL (ref 70–99)
GLUCOSE BLDC GLUCOMTR-MCNC: 105 MG/DL (ref 70–99)
GLUCOSE BLDC GLUCOMTR-MCNC: 106 MG/DL (ref 70–99)
GLUCOSE BLDC GLUCOMTR-MCNC: 107 MG/DL (ref 70–99)
GLUCOSE BLDC GLUCOMTR-MCNC: 117 MG/DL (ref 70–99)
GLUCOSE BLDC GLUCOMTR-MCNC: 121 MG/DL (ref 70–99)
GLUCOSE BLDC GLUCOMTR-MCNC: 142 MG/DL (ref 70–99)
GLUCOSE BLDC GLUCOMTR-MCNC: 155 MG/DL (ref 70–99)
GLUCOSE SERPL-MCNC: 117 MG/DL (ref 70–99)
HCT VFR BLD AUTO: 27.7 % (ref 40–53)
HGB BLD-MCNC: 9 G/DL (ref 13.3–17.7)
INTERPRETATION ECG - MUSE: NORMAL
INTERPRETATION ECG - MUSE: NORMAL
MCH RBC QN AUTO: 30.3 PG (ref 26.5–33)
MCHC RBC AUTO-ENTMCNC: 32.5 G/DL (ref 31.5–36.5)
MCV RBC AUTO: 93 FL (ref 78–100)
NUM BPU REQUESTED: 2
PLATELET # BLD AUTO: 66 10E9/L (ref 150–450)
POTASSIUM SERPL-SCNC: 4.2 MMOL/L (ref 3.4–5.3)
PROT SERPL-MCNC: 5.5 G/DL (ref 6.8–8.8)
RBC # BLD AUTO: 2.97 10E12/L (ref 4.4–5.9)
SODIUM SERPL-SCNC: 144 MMOL/L (ref 133–144)
WBC # BLD AUTO: 9.9 10E9/L (ref 4–11)

## 2018-02-15 PROCEDURE — 25000132 ZZH RX MED GY IP 250 OP 250 PS 637: Mod: GY | Performed by: PHYSICIAN ASSISTANT

## 2018-02-15 PROCEDURE — 00000146 ZZHCL STATISTIC GLUCOSE BY METER IP

## 2018-02-15 PROCEDURE — A9270 NON-COVERED ITEM OR SERVICE: HCPCS | Mod: GY | Performed by: PHYSICIAN ASSISTANT

## 2018-02-15 PROCEDURE — 12000007 ZZH R&B INTERMEDIATE

## 2018-02-15 PROCEDURE — 99222 1ST HOSP IP/OBS MODERATE 55: CPT | Performed by: HOSPITALIST

## 2018-02-15 PROCEDURE — 97162 PT EVAL MOD COMPLEX 30 MIN: CPT | Mod: GP | Performed by: PHYSICAL THERAPIST

## 2018-02-15 PROCEDURE — 25000128 H RX IP 250 OP 636: Performed by: THORACIC SURGERY (CARDIOTHORACIC VASCULAR SURGERY)

## 2018-02-15 PROCEDURE — 40000193 ZZH STATISTIC PT WARD VISIT: Performed by: PHYSICAL THERAPIST

## 2018-02-15 PROCEDURE — A9270 NON-COVERED ITEM OR SERVICE: HCPCS | Mod: GY | Performed by: THORACIC SURGERY (CARDIOTHORACIC VASCULAR SURGERY)

## 2018-02-15 PROCEDURE — 25800025 ZZH RX 258: Performed by: PHYSICIAN ASSISTANT

## 2018-02-15 PROCEDURE — 97530 THERAPEUTIC ACTIVITIES: CPT | Mod: GP | Performed by: PHYSICAL THERAPIST

## 2018-02-15 PROCEDURE — 80053 COMPREHEN METABOLIC PANEL: CPT | Performed by: SURGERY

## 2018-02-15 PROCEDURE — 85027 COMPLETE CBC AUTOMATED: CPT | Performed by: SURGERY

## 2018-02-15 PROCEDURE — 99207 ZZC CONSULT E&M CHANGED TO INITIAL LEVEL: CPT | Performed by: HOSPITALIST

## 2018-02-15 PROCEDURE — 25000132 ZZH RX MED GY IP 250 OP 250 PS 637: Mod: GY | Performed by: THORACIC SURGERY (CARDIOTHORACIC VASCULAR SURGERY)

## 2018-02-15 PROCEDURE — A9270 NON-COVERED ITEM OR SERVICE: HCPCS | Mod: GY

## 2018-02-15 PROCEDURE — 25000132 ZZH RX MED GY IP 250 OP 250 PS 637: Mod: GY

## 2018-02-15 RX ORDER — FUROSEMIDE 10 MG/ML
20 INJECTION INTRAMUSCULAR; INTRAVENOUS
Status: COMPLETED | OUTPATIENT
Start: 2018-02-15 | End: 2018-02-15

## 2018-02-15 RX ORDER — FAMOTIDINE 10 MG
10 TABLET ORAL 2 TIMES DAILY
Status: DISCONTINUED | OUTPATIENT
Start: 2018-02-15 | End: 2018-02-19 | Stop reason: HOSPADM

## 2018-02-15 RX ADMIN — POTASSIUM CHLORIDE, DEXTROSE MONOHYDRATE AND SODIUM CHLORIDE: 150; 5; 450 INJECTION, SOLUTION INTRAVENOUS at 11:17

## 2018-02-15 RX ADMIN — FUROSEMIDE 20 MG: 10 INJECTION, SOLUTION INTRAVENOUS at 16:33

## 2018-02-15 RX ADMIN — METOPROLOL TARTRATE 25 MG: 25 TABLET ORAL at 08:04

## 2018-02-15 RX ADMIN — ACETAMINOPHEN 975 MG: 325 TABLET, FILM COATED ORAL at 08:03

## 2018-02-15 RX ADMIN — ACETAMINOPHEN 975 MG: 325 TABLET, FILM COATED ORAL at 16:34

## 2018-02-15 RX ADMIN — FAMOTIDINE 10 MG: 10 TABLET, FILM COATED ORAL at 21:55

## 2018-02-15 RX ADMIN — MUPIROCIN 0.5 G: 20 OINTMENT TOPICAL at 21:55

## 2018-02-15 RX ADMIN — ATORVASTATIN CALCIUM 10 MG: 10 TABLET, FILM COATED ORAL at 08:03

## 2018-02-15 RX ADMIN — FAMOTIDINE 10 MG: 10 TABLET, FILM COATED ORAL at 08:03

## 2018-02-15 RX ADMIN — Medication 12.5 MG: at 18:49

## 2018-02-15 RX ADMIN — MUPIROCIN 0.5 G: 20 OINTMENT TOPICAL at 11:17

## 2018-02-15 RX ADMIN — FUROSEMIDE 20 MG: 10 INJECTION, SOLUTION INTRAVENOUS at 08:03

## 2018-02-15 RX ADMIN — METOPROLOL TARTRATE 25 MG: 25 TABLET ORAL at 21:54

## 2018-02-15 RX ADMIN — OXYCODONE HYDROCHLORIDE 5 MG: 5 TABLET ORAL at 14:27

## 2018-02-15 NOTE — PLAN OF CARE
Problem: Cardiac Surgery/Open with Cardiopulmonary Bypass (Infant)  Goal: Signs and Symptoms of Listed Potential Problems Will be Absent, Minimized or Managed (Cardiac Surgery/Open with Cardiopulmonary Bypass)  Signs and symptoms of listed potential problems will be absent, minimized or managed by discharge/transition of care (reference Cardiac Surgery/Open with Cardiopulmonary Bypass (Infant) CPG).   Outcome: Improving  [facility]   Intensive Care Unit   Nursing Note    Vital signs stable during the day with nicardipine and nitro drips.  SBP goal .   PERRL.  Moves all extremities.  Follows commands.  Pt dangled and sat in the chair for 40 minutes this afternoon. Pt advance to regular diet, but is drinking water and eating jello. Good urine output.  No airleak in CT, 400 ml dark red out today. Wife and Daughter updated to POC and medications at the bedside.  Labs noted.  Continue to monitor closely.        Recent Labs  Lab 02/14/18  1134 02/14/18  0555 02/14/18 0205 02/13/18  2225   PH 7.39 7.42 7.37 7.38   PCO2 40 36 40 36   PO2 71* 68* 80 74*   HCO3 25 24 23 21         ROUTINE IP LABS (Last four results)  BMP  Recent Labs  Lab 02/14/18  0555 02/13/18 2200 02/13/18 2053 02/13/18 2050 02/13/18 2001    145* 144 147*  < > 144   POTASSIUM 4.0 4.0 3.2* 3.3*  < > 4.3   CHLORIDE 113* 112*  --  112*  --  110*   TAMARA 7.2* 7.2*  --  7.6*  --  7.2*   CO2 25 21  --  20  --  21   BUN 23 20  --  21  --  21   CR 1.24 1.35*  --  1.40*  --  1.39*   * 185*  --  209*  --  178*   < > = values in this interval not displayed.  CBC  Recent Labs  Lab 02/14/18  0555 02/13/18 2200 02/13/18 2053 02/13/18 2050 02/13/18 2001   WBC 8.2 8.6  --  17.8*  --  18.9*   RBC 3.35* 3.28*  --  2.96*  --  3.46*   HGB 10.3* 10.1* 7.5* 9.0*  < > 10.7*   HCT 30.3* 29.8*  --  27.1*  --  31.7*   MCV 90 91  --  92  --  92   MCH 30.7 30.8  --  30.4  --  30.9   MCHC 34.0 33.9  --  33.2  --  33.8   RDW 14.3 14.0  --   14.2  --  14.2   PLT 80* 67*  --  118*  --  97*   < > = values in this interval not displayed.  INR  Recent Labs  Lab 02/14/18  0555 02/13/18  2200 02/13/18 2050 02/13/18 2001   INR 0.93 0.86 0.82* 1.65*       Gage Sutherland RN

## 2018-02-15 NOTE — PROGRESS NOTES
"Intensivist note  He was seen on vent this morning. Though still on 50%, he was able to be extubated and is awake and alert. He is down to 5-6 liters oxygen. Hemodynamically well compensated.     I spoke with patient and family at bedside.     Assessment and Plan  1. Acute dissection of ascending aortic anuerysm resulting in pericardial effusion (bloody) and s/p aortic tube graft replacement. Clinically he is well compensated and will continue to support and follow.   2. Acute respiratory failure- compensated and finally extubated late in the morning, now on 5 liters NC oxygen. Continue to focus on pulmonary hygiene and increasing mobility.   3. History of HTN  4. Dyslipidemia   5. GERD  6. BPH  7. History of Urinary frequency  Overall acute and critical earlier, and finally improving later in the day. 30 minutes of critical care time spent with him today.    Physical exam  Well developed male nad  /86  Pulse 94  Temp 100  F (37.8  C)  Resp 24  Ht 1.778 m (5' 10\")  Wt 106.6 kg (235 lb 0.2 oz)  SpO2 93%  BMI 33.72 kg/m2  Lungs with mild rhonchi  Heart is RRR  Abdomen is soft and non-tender  Extremities are warm with minimal edema  Skin shows no cyanosis or mottling  CNS moves all extremities well to command    Labs reviewed    david hightower  February 14, 2018            "

## 2018-02-15 NOTE — PROGRESS NOTES
02/15/18 1340   Quick Adds   Type of Visit Initial PT Evaluation   Living Environment   Lives With spouse   Living Arrangements house   Home Accessibility stairs to enter home;stairs within home;bed not on first floor  (2nd floor bedroom)   Number of Stairs to Enter Home 1   Number of Stairs Within Home 14   Transportation Available car;family or friend will provide   Living Environment Comment 2 story home   Self-Care   Usual Activity Tolerance good   Current Activity Tolerance fair   Equipment Currently Used at Home none   Activity/Exercise/Self-Care Comment patient normally independent and active at baseline; no device   Functional Level Prior   Ambulation 0-->independent   Transferring 0-->independent   Toileting 0-->independent   Bathing 0-->independent   Dressing 0-->independent   Eating 0-->independent   Communication 0-->understands/communicates without difficulty   Swallowing 0-->swallows foods/liquids without difficulty   Cognition 0 - no cognition issues reported   Fall history within last six months no   Which of the above functional risks had a recent onset or change? ambulation;transferring;toileting   Prior Functional Level Comment patient previously independent   General Information   Onset of Illness/Injury or Date of Surgery - Date 02/13/18   Referring Physician Concha Pappas PA-C   Patient/Family Goals Statement return home   Pertinent History of Current Problem (include personal factors and/or comorbidities that impact the POC) per chart:  Mr. Moss is a very pleasant 67-year-old gentleman with no prior cardiac history who presented to the emergency room with complaints of chest pain/back pain that woke him up in the middle of the night ; patient found to have a  Acute type A aortic dissection and ascending aortic aneurysm; Patient underwent  Emergent type A aortic dissection repair; see medical record for further information   Precautions/Limitations oxygen therapy device and  L/min;sternal precautions;fall precautions  (3L; chest tubes; Keep supine BP < 130)   General Observations patient in bed; wife present initially   General Info Comments Activity: ambulate with assist   Cognitive Status Examination   Orientation orientation to person, place and time   Level of Consciousness alert   Follows Commands and Answers Questions 100% of the time   Personal Safety and Judgment intact   Memory intact   Pain Assessment   Patient Currently in Pain No   Integumentary/Edema   Integumentary/Edema Comments sternal incision   Posture    Posture Comments WFL   Range of Motion (ROM)   ROM Comment WFL; UE's limited some by sternal discomfort   Strength   Strength Comments functional weakness from recent surgery/immobility   Bed Mobility   Bed Mobility Comments Mod/max A at shoulders to come to sitting secondary to sternal precautions; SBA of another to manage lines   Transfer Skills   Transfer Comments sit>stand with min A of 1 and SBA of another for tube management; no use of a device   Gait   Gait Comments able to take steps from bed to bedside commode with min A x 1-2 and safety cues   Balance   Balance Comments current need for UE support   Sensory Examination   Sensory Perception Comments intact   General Therapy Interventions   Planned Therapy Interventions bed mobility training;gait training;strengthening;transfer training;progressive activity/exercise   Clinical Impression   Criteria for Skilled Therapeutic Intervention yes, treatment indicated   PT Diagnosis impaired gait/transfers   Influenced by the following impairments sternal precautions; functional weakness; decreased activity tolerance; impaired balance   Functional limitations due to impairments impaired independence with functional mobility   Clinical Presentation Stable/Uncomplicated   Clinical Presentation Rationale mod/max A for bed mobility; multiple stairs to access home; weakness   Clinical Decision Making (Complexity) Moderate  "complexity   Therapy Frequency` daily   Predicted Duration of Therapy Intervention (days/wks) 5 days   Anticipated Equipment Needs at Discharge (to be determined)   Anticipated Discharge Disposition Home with Assist;Home with Outpatient Therapy   Risk & Benefits of therapy have been explained Yes   Patient, Family & other staff in agreement with plan of care Yes   [facility] TM \"6 Clicks\"   2016, Trustees of [facility], under license to [employer].  All rights reserved.   6 Clicks Short Forms Basic Mobility Inpatient Short Form   Health system-[facility]  \"6 Clicks\" V.2 Basic Mobility Inpatient Short Form   1. Turning from your back to your side while in a flat bed without using bedrails? 2 - A Lot   2. Moving from lying on your back to sitting on the side of a flat bed without using bedrails? 2 - A Lot   3. Moving to and from a bed to a chair (including a wheelchair)? 3 - A Little   4. Standing up from a chair using your arms (e.g., wheelchair, or bedside chair)? 3 - A Little   5. To walk in hospital room? 3 - A Little   6. Climbing 3-5 steps with a railing? 1 - Total   Basic Mobility Raw Score (Score out of 24.Lower scores equate to lower levels of function) 14   Total Evaluation Time   Total Evaluation Time (Minutes) 15     "

## 2018-02-15 NOTE — PROGRESS NOTES
SBP/Drips-  Pt weaned from Nitro gtt at 2324 on 2/14/18. Sherman gtt started at 2345 to maintain SBP . Will continue to monitor.

## 2018-02-15 NOTE — PROGRESS NOTES
[facility]  Cardiovascular and Thoracic Surgery Daily Note          Assessment and Plan:   POD#2 s/p Emergent type A aortic dissection repair with a 34 mm Hemashield interposition graft, intraoperative OLI  -CVS: HR: 60-70s. SBP: 90-110s. Weaned off nitro and nicardipine. Pressure a little soft with cozaar requiring a little lana this am- will decrease to 12.5mg. Pacer wires capped. Chest tubes too much to pull today. ASA held today. BB, statin.  -Resp: Extubated within 24 hours of surgery. Saturating well on 5L. Weaning as able. Continue to encourage IS, cough, deep breathing, ambulation  -Neuro:  Grossly intact. Pain controlled.   -Renal: Good UOP. Up about 10kg from preoperative weight. Cr: 0.97.Will give 20mg IV lasix x 2 doses today.   -GI:  Tolerating diet. Continue bowel regimen.   -: Miller catheter to remain in d/t limited mobility and need for accurate I&Os.   -Endo: pre op A1c: 5.5. Completed insulin gtt per protocol, transition to SSI today.  -FEN: replace electrolytes as needed. Na: 144. K: 4.2.    Active Diet Order      Regular Diet Adult  -ID: Temp (24hrs), Av.1  F (37.3  C), Min:95.7  F (35.4  C), Max:100.8  F (38.2  C)  WBC: 9.9. Completed perioperative abx.   -Heme: Hgb: 9.0. Plt: 66. Acute blood loss anemia and thrombocytopenia related to surgery. Will hold ASA and sub q heparin today for thrombocytopenia  -Proph: PCD, BB, statin, PPI.  -Dispo: Transfer to [facility] today. Continue therapies. Continue to encourage IS, cough, deep breathing, ambulation          Interval History:   No acute events overnight, pain controlled. Saturating well on 5L. Tolerating diet. No BM.         Medications:       famotidine  10 mg Oral BID     metoprolol tartrate  25 mg Oral BID     atorvastatin (LIPITOR) tablet 10 mg  10 mg Oral Daily     sodium chloride (PF)  3 mL Intracatheter Q8H     insulin aspart   Subcutaneous TID w/meals     mupirocin  0.5 g Both Nostrils BID     acetaminophen  975 mg Oral  "Q8H     metoprolol, lidocaine (buffered or not buffered), lidocaine 4%, sodium chloride (PF), Reason beta blocker order not selected, albumin human, hydrALAZINE, insulin aspart, naloxone, potassium chloride, potassium chloride, potassium chloride, potassium chloride with lidocaine, potassium chloride, magnesium sulfate, magnesium sulfate, sodium phosphate, sodium phosphate, sodium phosphate, sodium phosphate, HYDROmorphone, [START ON 2/16/2018] acetaminophen, oxyCODONE IR, ondansetron **OR** ondansetron, prochlorperazine **OR** prochlorperazine, senna-docusate **OR** senna-docusate, IV fluid REPLACEMENT ONLY, glucose **OR** dextrose **OR** glucagon          Physical Exam:   Vitals were reviewed  Blood pressure 131/75, pulse 94, temperature 99.5  F (37.5  C), resp. rate 17, height 1.778 m (5' 10\"), weight 109.3 kg (240 lb 15.4 oz), SpO2 98 %.  Rhythm: NSR    Lungs: diminished bases    Cardiovascular: RRR normal s1 and s2    Abdomen: soft NTND    Extremeties: 1+ edema lower extremities    Incision: CDI    CT: to suciton    Weight:   Vitals:    02/13/18 1017 02/14/18 0600 02/15/18 0400   Weight: 99.8 kg (220 lb) 106.6 kg (235 lb 0.2 oz) 109.3 kg (240 lb 15.4 oz)            Data:   Labs:   Lab Results   Component Value Date    WBC 9.9 02/15/2018     Lab Results   Component Value Date    RBC 2.97 02/15/2018     Lab Results   Component Value Date    HGB 9.0 02/15/2018     Lab Results   Component Value Date    HCT 27.7 02/15/2018     No components found for: MCT  Lab Results   Component Value Date    MCV 93 02/15/2018     Lab Results   Component Value Date    MCH 30.3 02/15/2018     Lab Results   Component Value Date    MCHC 32.5 02/15/2018     Lab Results   Component Value Date    RDW 14.8 02/15/2018     Lab Results   Component Value Date    PLT 66 02/15/2018       Last Basic Metabolic Panel:  Lab Results   Component Value Date     02/15/2018      Lab Results   Component Value Date    POTASSIUM 4.2 02/15/2018 "     Lab Results   Component Value Date    CHLORIDE 112 02/15/2018     Lab Results   Component Value Date    TAMARA 7.4 02/15/2018     Lab Results   Component Value Date    CO2 26 02/15/2018     Lab Results   Component Value Date    BUN 24 02/15/2018     Lab Results   Component Value Date    CR 0.97 02/15/2018     Lab Results   Component Value Date     02/15/2018       CXR: 2/14/18    IMPRESSION: Endotracheal tube has been pulled back into good position.  Central venous catheter with the tip in the right pulmonary artery.  Mediastinal tubes, cardiomegaly, left lower lobe atelectasis and  minimal right lower lobe atelectasis are unchanged.    Nicole Melara PA-C  CV Surgery  Pager # [phone]

## 2018-02-15 NOTE — PLAN OF CARE
Problem: Patient Care Overview  Goal: Plan of Care/Patient Progress Review  PT/CR: Order received; Initial evaluation completed and treatment initiated POD #2 s/p aortic dissection repair. Prior to admit patient was very active, independent without an assistive device, and living in a 2 story home with a 2nd floor bedroom; Patient is retired.  Discharge Planner PT   Patient plan for discharge: home with assist of spouse  Current status: Patient currently needing mod/max A for supine to sit bed mobility secondary to sternal precautions; /72; HR 74; O2 sats 99% on 3L; no dizziness at EOB; transfer to a commode instead of chair initially secondary to needing to have a bowel movement; min A x 1-2 for safety with transfer to commode; no gross LOB; mild unsteadiness  Barriers to return to prior living situation: multiple stairs to access home environment; current need for assist  Recommendations for discharge: Home with possible OP CR if min A or less with all functional mobility; Spouse able to provide some assist; Acute rehab if needing more assist  Rationale for recommendations: functional weakness; impaired balance; decreased activity tolerance; medically complex       Entered by: Myra Snow 02/15/2018 2:39 PM

## 2018-02-15 NOTE — PLAN OF CARE
Problem: PT General Care Plan  Goal: PT Goal 1  PT Goal 1      4. Independently verbalize understanding of activity guidelines and safe progression of home exercise program.

## 2018-02-15 NOTE — CONSULTS
Consult Date:  02/15/2018      DATE OF ADMISSION:  02/13/2018        DATE OF CONSULTATION:  02/15/2018      REASON FOR CONSULTATION:  Postoperative medical management, patient with hypertension, hyperlipidemia, GERD, BPH, status post repair of aortic dissection.        HISTORY OF PRESENT ILLNESS:  Vincenzo Moss is a 67-year-old very pleasant male with medical history significant for hypertension, hyperlipidemia, BPH status post TURP, who presented to the Emergency Department 2 days ago with symptoms including chest pain, throat discomfort, headache and lightheadedness.  I discussed with the patient and reviewed prior notes for further information.  It looks like the patient woke up 2 nights ago in the morning with headache and pain toward his left ear.  He took some Advil but then felt lightheaded and developed diaphoresis.  His headache improved but then developed tightness and pain in his throat, down to his central chest and so he came to the Emergency Room.  The patient felt shortness of breath as well as numbness in his hands and face.      The patient denied any neck pain, stiffness, blurry vision, focal weakness, facial droop, dysphagia, nausea, vomiting, abdominal pain, flank pain, hematuria.      In Emergency Room the patient was evaluated by Dr. Vandana Messer.  The patient was diaphoretic, short of breath, pale, lightheaded and was reporting headache.  He was hypotensive with blood pressure as low as 60/40.  CT head and CT aortic survey with contrast was done urgently.  Head CT was negative but CT aortic survey showed fusiform ascending thoracic aortic aneurysm measuring up to 6.5 cm with pericardial effusion.  CT head and neck angiogram as well was done which was normal.  The patient received normal saline bolus, Zofran, IV vancomycin and Zosyn as well and was started on Levophed.  Dr. Toledo from Thoracic Surgery was contacted and patient was taken to surgery urgently to repair his acute type A  aortic dissection and ascending aortic aneurysm.  The patient had emergent surgery and type A aortic dissection was repaired with 34 mm Hemashield interposition graft along with intraoperative OLI.  The patient received 2 units of FFP, 2 units of platelet, 1 pack of cryoprecipitate and 1 pack of packed red blood cells  intraoperatively.  Factor VII were also given for hemostasis.  The patient was transferred to ICU in hemodynamically stable condition but on low-dose epinephrine drip and remained intubated.      The patient was successfully extubated yesterday. Postoperatively the patient was maintained on nitroglycerin drip as well nicardipine drip also for strict blood pressure control given his aneurysm repair.  He did become hypotensive and required phenylephrine last night, though he has been off all IV drips for his blood pressure.  He now has been restarted on his PTA metoprolol and also received losartan this morning.  The patient was followed by Intensivist and is now being transferred out of ICU.  Hospitalist Service has been consulted to follow up on his medical issues.      The patient currently feels he cannot take deep breaths, but is working on his incentive spirometry.  His postoperative pain is well-controlled and is about 3-4/10.  He has some throat discomfort he believes due to the endotracheal tube.  He has a Miller catheter and his urine output has remained good.  Denies abdominal pain or nausea.  He has flatus but has not had a bowel movement.  Denies headache or dizziness.      REVIEW OF SYSTEMS:  All 10-point systems were reviewed and is negative other than mentioned in the HPI.      PAST MEDICAL HISTORY:   1.  Hypertension.   2.  Hyperlipidemia.   3.  GERD.   4.  BPH.   5.  Hard of hearing.      PAST SURGICAL HISTORY:     1.  TURP.   2.  Hernia repair.   3.  CNS surgery after childhood trauma.   4.  Colonoscopy.   5.  Status post repair of ascending aortic aneurysm dissection.      FAMILY  HISTORY:  Reviewed and noncontributory to his presentation.  Per chart review father had liver cancer.  Mother had pancreatic cancer.  Maternal aunt with Alzheimer's and daughter has Graves' disease.      SOCIAL HISTORY:  Never smoked.  Drinks socially about 2 beers a day.  Denies recreational drug use.      ALLERGIES:  NO KNOWN DRUG ALLERGIES.      PRIOR TO ADMISSION MEDICATIONS:  Atorvastatin 10 mg by mouth daily, losartan 50 mg by mouth daily, multivitamin 1 tab by mouth daily, calcium citrate and vitamin D 1 tablet by mouth daily.      PHYSICAL EXAMINATION:   GENERAL:  The patient is alert, awake, oriented, does not appear to be in distress.   VITAL SIGNS:  Blood pressure at the time of exam was 127/72, heart rate 70, temperature 99.5, respiration 18, pulse oximetry 99% on 3 L nasal cannula.   HEENT:  Pupils are bilaterally equal, round, reactive to light.  Oral mucosa moist.   NECK:  Supple.   LUNGS:  Bilateral equal air entry.  Fine basilar crepitations.  No wheezing.  Normal work of breathing.   CARDIOVASCULAR:  S1, S2 regular.  No murmur, rub or gallop.  No tachycardia.   CHEST EXAM:  He has a sternotomy incision without any gapping or drainage.  Chest tubes in place.     ABDOMEN:  Soft, nontender.  Bowel sounds active.   :  Miller catheter in place.   MUSCULOSKELETAL:  1+ to trace pitting pedal edema bilaterally.  No calf tenderness.   SKIN:  No rash.   NEUROLOGIC:  Alert and oriented.  Cranial nerves II-XII intact.  Motor strength equal and symmetrical.      LABORATORY DATA:  Sodium 144, potassium 4.2, chloride 112, bicarbonate 26, BUN 24, creatinine 0.97, calcium 7.4, albumin 2.8, protein 5.5.  LFTs normal.  Blood sugars in 100s.  WBC 9.9, hemoglobin 9, hematocrit 27.7, platelets 66,000.  MCV is normal at 93.      IMAGING:  Chest x-ray from yesterday shows bilateral lower lobe atelectasis.  He still had endotracheal tube at that time.   I reviewed prior CT head and neck angiogram, OLI, reports as above.       ASSESSMENT AND PLAN:  Vincenzo Moss is a 67-year-old pleasant male with medical history significant for hypertension, hyperlipidemia, BPH, GERD, who presented to the Emergency Room with chest pain, throat tightness and headache who was found to have an ascending aortic aneurysm, size 6.5 cm.   1.  Type A aortic dissection.  As stated above patient was urgently taken to Operating Room soon after presentation on 02/13/2018 and was repaired with Hemashield interposition graft.  The patient received blood product as above during Operating Room, remained intubated for 24 hours postoperatively, was successfully extubated yesterday.  He is being managed by Cardiothoracic Surgery including fluid, blood pressure, pain control, catheter, prophylactic antibiotic and chest tubes.  Plan is to transfer out to surgical floor today.   2.  Anemia and thrombocytopenia.   The patient presented with hemoglobin of 14.6 and normal platelet count of 151,000.  He has dropped his hemoglobin and platelet significantly but suspect this is due to consumption and blood loss from surgery.  The patient has received blood products as above.  Hemoglobin has been fairly stable at range of 9.  Follow daily.   His platelet count has dropped to below 50%.  Though this could be due to consumption, possibility of HIT remains as he was exposed to heparin during surgery.  If his platelet count drops further, will get HIT panel.  No signs of bleeding at this point.  Monitor.   3.  Cardiogenic shock likely due to dissecting aortic aneurysm.  The patient was maintained on vasopressor but he is off all vasopressors.     4.  BPH.  The patient had TURP done in 12/2016.  He has not had an issue with urinary retention.  His Miller catheter will be mostly removed today.  He should have bladder scan every shift to monitor for any retention postoperatively.  He is not on any alpha blocker or alpha reductase inhibitor.  Monitor intake and output.   5.   Hyperlipidemia and hypertension.  The patient needs strict blood pressure control given his aneurysm and status post repair.  Blood pressure managed by CT Surgery.     6.  Hyperglycemia likely due to stress.  His A1c was 5.5.  The patient has been maintained on insulin drip per protocol for 48 hours postoperatively.  He will be transitioned to sliding scale insulin.   7.  Gastroesophageal reflux disease.   8.  Deep venous thrombosis prophylaxis.  Defer to primary.     9.  Postoperative fever.  The patient had temperature of 101.3 postoperative day #1.  He has completed prophylactic antibiotic.  The patient has completed prophylactic antibiotics and fever has trended down.  Blood cultures drawn from 2 days ago remain negative.     Encourage incentive spirometry.     No other focal symptoms of infection.   Chest x-ray finding suggestive of atelectasis.   CODE STATUS:  FULL CODE.      The above plan was discussed with the patient.  Discharge planning pending postoperative recovery, likely 2 plus days.     Thank you for involving the Hospitalist Service in his care.  We will continue to follow.         DONAL TANNER MD             D: 02/15/2018   T: 02/15/2018   MT: KIRA      Name:     FRED CLARK   MRN:      [id number]        Account:       [id number]   :      1950           Consult Date:  02/15/2018      Document: [id number]

## 2018-02-15 NOTE — PLAN OF CARE
Problem: Patient Care Overview  Goal: Plan of Care/Patient Progress Review  Outcome: Improving  Pt is alert and oriented x4. Saturations >94% on 5L nc. Sinus rhythm. Nicardipine stopped, Nitro stopped. Sherman gtt restarted @ 2345 and then stopped at 0500.  Current art Bp is 110/54. Chest tube output was 290mls this shift. Pt chest tube dressing change done and midline incision done as well. Pt had no complaints of nausea. No BM this shift. Adequate UOP this shift.

## 2018-02-16 ENCOUNTER — APPOINTMENT (OUTPATIENT)
Dept: PHYSICAL THERAPY | Facility: CLINIC | Age: 68
DRG: 219 | End: 2018-02-16
Payer: MEDICARE

## 2018-02-16 LAB
ANION GAP SERPL CALCULATED.3IONS-SCNC: 6 MMOL/L (ref 3–14)
BASOPHILS # BLD AUTO: 0 10E9/L (ref 0–0.2)
BASOPHILS NFR BLD AUTO: 0.2 %
BUN SERPL-MCNC: 19 MG/DL (ref 7–30)
CALCIUM SERPL-MCNC: 8.2 MG/DL (ref 8.5–10.1)
CHLORIDE SERPL-SCNC: 108 MMOL/L (ref 94–109)
CO2 SERPL-SCNC: 25 MMOL/L (ref 20–32)
CREAT SERPL-MCNC: 0.78 MG/DL (ref 0.66–1.25)
DIFFERENTIAL METHOD BLD: ABNORMAL
EOSINOPHIL # BLD AUTO: 0.2 10E9/L (ref 0–0.7)
EOSINOPHIL NFR BLD AUTO: 2.1 %
ERYTHROCYTE [DISTWIDTH] IN BLOOD BY AUTOMATED COUNT: 14.2 % (ref 10–15)
GFR SERPL CREATININE-BSD FRML MDRD: >90 ML/MIN/1.7M2
GLUCOSE BLDC GLUCOMTR-MCNC: 101 MG/DL (ref 70–99)
GLUCOSE BLDC GLUCOMTR-MCNC: 117 MG/DL (ref 70–99)
GLUCOSE BLDC GLUCOMTR-MCNC: 120 MG/DL (ref 70–99)
GLUCOSE BLDC GLUCOMTR-MCNC: 133 MG/DL (ref 70–99)
GLUCOSE SERPL-MCNC: 110 MG/DL (ref 70–99)
HCT VFR BLD AUTO: 31.7 % (ref 40–53)
HGB BLD-MCNC: 10.4 G/DL (ref 13.3–17.7)
IMM GRANULOCYTES # BLD: 0 10E9/L (ref 0–0.4)
IMM GRANULOCYTES NFR BLD: 0.3 %
LYMPHOCYTES # BLD AUTO: 0.8 10E9/L (ref 0.8–5.3)
LYMPHOCYTES NFR BLD AUTO: 9 %
MCH RBC QN AUTO: 30.6 PG (ref 26.5–33)
MCHC RBC AUTO-ENTMCNC: 32.8 G/DL (ref 31.5–36.5)
MCV RBC AUTO: 93 FL (ref 78–100)
MONOCYTES # BLD AUTO: 0.8 10E9/L (ref 0–1.3)
MONOCYTES NFR BLD AUTO: 8.6 %
NEUTROPHILS # BLD AUTO: 7.4 10E9/L (ref 1.6–8.3)
NEUTROPHILS NFR BLD AUTO: 79.8 %
NRBC # BLD AUTO: 0 10*3/UL
NRBC BLD AUTO-RTO: 0 /100
PLATELET # BLD AUTO: 77 10E9/L (ref 150–450)
POTASSIUM SERPL-SCNC: 3.6 MMOL/L (ref 3.4–5.3)
RBC # BLD AUTO: 3.4 10E12/L (ref 4.4–5.9)
SODIUM SERPL-SCNC: 139 MMOL/L (ref 133–144)
WBC # BLD AUTO: 9.3 10E9/L (ref 4–11)

## 2018-02-16 PROCEDURE — 25000132 ZZH RX MED GY IP 250 OP 250 PS 637: Mod: GY | Performed by: PHYSICIAN ASSISTANT

## 2018-02-16 PROCEDURE — A9270 NON-COVERED ITEM OR SERVICE: HCPCS | Mod: GY | Performed by: PHYSICIAN ASSISTANT

## 2018-02-16 PROCEDURE — 80048 BASIC METABOLIC PNL TOTAL CA: CPT | Performed by: HOSPITALIST

## 2018-02-16 PROCEDURE — 25000128 H RX IP 250 OP 636: Performed by: PHYSICIAN ASSISTANT

## 2018-02-16 PROCEDURE — 97530 THERAPEUTIC ACTIVITIES: CPT | Mod: GP | Performed by: PHYSICAL THERAPIST

## 2018-02-16 PROCEDURE — 12000007 ZZH R&B INTERMEDIATE

## 2018-02-16 PROCEDURE — 85025 COMPLETE CBC W/AUTO DIFF WBC: CPT | Performed by: HOSPITALIST

## 2018-02-16 PROCEDURE — 99232 SBSQ HOSP IP/OBS MODERATE 35: CPT | Performed by: HOSPITALIST

## 2018-02-16 PROCEDURE — A9270 NON-COVERED ITEM OR SERVICE: HCPCS | Mod: GY | Performed by: THORACIC SURGERY (CARDIOTHORACIC VASCULAR SURGERY)

## 2018-02-16 PROCEDURE — 97116 GAIT TRAINING THERAPY: CPT | Mod: GP | Performed by: PHYSICAL THERAPIST

## 2018-02-16 PROCEDURE — 40000193 ZZH STATISTIC PT WARD VISIT: Performed by: PHYSICAL THERAPIST

## 2018-02-16 PROCEDURE — 36415 COLL VENOUS BLD VENIPUNCTURE: CPT | Performed by: HOSPITALIST

## 2018-02-16 PROCEDURE — 00000146 ZZHCL STATISTIC GLUCOSE BY METER IP

## 2018-02-16 PROCEDURE — 25000132 ZZH RX MED GY IP 250 OP 250 PS 637: Mod: GY | Performed by: THORACIC SURGERY (CARDIOTHORACIC VASCULAR SURGERY)

## 2018-02-16 RX ORDER — FUROSEMIDE 10 MG/ML
20 INJECTION INTRAMUSCULAR; INTRAVENOUS
Status: COMPLETED | OUTPATIENT
Start: 2018-02-16 | End: 2018-02-17

## 2018-02-16 RX ADMIN — POTASSIUM CHLORIDE 20 MEQ: 1500 TABLET, EXTENDED RELEASE ORAL at 17:43

## 2018-02-16 RX ADMIN — MUPIROCIN 0.5 G: 20 OINTMENT TOPICAL at 21:22

## 2018-02-16 RX ADMIN — ACETAMINOPHEN 975 MG: 325 TABLET, FILM COATED ORAL at 00:08

## 2018-02-16 RX ADMIN — Medication 12.5 MG: at 08:02

## 2018-02-16 RX ADMIN — FUROSEMIDE 20 MG: 10 INJECTION, SOLUTION INTRAVENOUS at 17:43

## 2018-02-16 RX ADMIN — Medication 37.5 MG: at 10:15

## 2018-02-16 RX ADMIN — ACETAMINOPHEN 975 MG: 325 TABLET, FILM COATED ORAL at 16:29

## 2018-02-16 RX ADMIN — FAMOTIDINE 10 MG: 10 TABLET, FILM COATED ORAL at 10:15

## 2018-02-16 RX ADMIN — FAMOTIDINE 10 MG: 10 TABLET, FILM COATED ORAL at 21:21

## 2018-02-16 RX ADMIN — MUPIROCIN 0.5 G: 20 OINTMENT TOPICAL at 08:01

## 2018-02-16 RX ADMIN — ATORVASTATIN CALCIUM 10 MG: 10 TABLET, FILM COATED ORAL at 08:02

## 2018-02-16 RX ADMIN — Medication 37.5 MG: at 21:22

## 2018-02-16 RX ADMIN — ACETAMINOPHEN 975 MG: 325 TABLET, FILM COATED ORAL at 08:01

## 2018-02-16 NOTE — PLAN OF CARE
Problem: Patient Care Overview  Goal: Plan of Care/Patient Progress Review  Outcome: Improving  Pt up in millard with therapy, denies the need for pain medications, tolerates diet, chest tube in place, shay remains per MD request, good urine output, passing flatus,

## 2018-02-16 NOTE — PLAN OF CARE
Problem: Patient Care Overview  Goal: Plan of Care/Patient Progress Review  Discharge Planner PT   Patient plan for discharge: return home with assist of wife  Current status: Patient needing mod/max A at shoulders to come to sitting to maintain sternal precautions; HOB at 30 degrees; mild dizziness when initially coming to sitting; sit>stand with min/CGA and safety/sequencing cues; able to ambulate in hallway x 100 feet with a rolling walker, min/CGA, and wheelchair follow; after this distance patient started to feel hot, sweaty, and lightheaded. HR 76 per tele; Stand to sit with mod A of 1 and cues and assist to maintain sternal precautions; sat x 5 minutes for recovery; Felt improved with rest and able to ambulate x 100 feet back to bedside chair with same level of assist; mild dizziness but no longer hot and sweaty; /71 in supine prior to activity and 120/75 after walking; HR 68-76 during session. Up in bedside chair for lunch at end of session  Barriers to return to prior living situation: stairs; assist for bed mobility; decreased activity tolerance  Recommendations for discharge: return home with spouse assist  Rationale for recommendations: impaired functional mobility; decreased activity tolerance       Entered by: Myra Snow 02/16/2018 12:14 PM

## 2018-02-16 NOTE — PLAN OF CARE
Problem: Patient Care Overview  Goal: Plan of Care/Patient Progress Review  Outcome: Improving  A&O x4, VSS, tele SR, CT x3 in place, no air leak, adequate UOP via shay. + flatus. Denies SOB, weaned off oxygen this am. Sat in the low to mid 90's. Encouraged sternal precautions & use of IS. Up with one assist.  LE edema-elevated. Will monitor.

## 2018-02-16 NOTE — PROGRESS NOTES
Met with patient. Discharge instructions reviewed. Contact information reviewed. Will follow as out patient.

## 2018-02-16 NOTE — PLAN OF CARE
"Problem: Patient Care Overview  Goal: Plan of Care/Patient Progress Review  PT/CR: Pm session limited by elevated BP at 137/80. Goal to maintain < 130 SBP so deferred further activity including gait and exercise.    Discharge Planner PT   Patient plan for discharge: none stated  Current status: min A x 2 for supine > sit EOB with step by step cuing for sternal precautions, min-mod A x 1 for sit > supine. Close SBA for sit <> stand from EOB to side step toward HOB prior to return to supine. Use of heart pillow and no UE support for sit <> stand and standing side step with CGA, good adherence to sternal precautions. Reports \"chilled\" sensation after sitting up at EOB. RN notified of elevated BPs > 130 this pm.  Barriers to return to prior living situation: decreased activity tolerance, level of assist with mobility and transfers, fall risk  Recommendations for discharge: home with wife assist as needed for supervision on stairs and ambulation, pending continued progress with transfers and gait distance once BPs controlled.  Rationale for recommendations: anticipate once BP controlled and pt able to progress mobility for transfers, gait and stairs with SBA of 1, will be able to discharge home with wife assist.       Entered by: Amanda Braga 02/16/2018 4:05 PM           "

## 2018-02-16 NOTE — PLAN OF CARE
Problem: Patient Care Overview  Goal: Plan of Care/Patient Progress Review  Outcome: Improving  Pt able to make needs known. BP monitored and kept per parameters of <130 systolic- awaiting dose of cozaar this evening. Wife at bedside updated with plan of care. Pt to chair x 3 today.  C/o minimal pain- oxycodone given x1 and scheduled tylenol. Cont to monitor.

## 2018-02-16 NOTE — PROGRESS NOTES
[facility]  Cardiovascular and Thoracic Surgery Daily Note          Assessment and Plan:   POD#3 s/p Emergent type A aortic dissection repair with a 34 mm Hemashield interposition graft, intraoperative OLI  -CVS: HR 60s, -130s. Cozaar dose decreased yesterday to 12.5 mg.  Pacer wires capped, chest tubes with ~320 ccs/24 hours.  Chest tubes too much to pull today.  -Resp: Extubated within 24 hours of surgery.  Saturating well on room air.  Continue to encourage IS, cough, deep breathing, ambulation  -Neuro:  Grossly intact.  Pain controlled  -Renal: Urine output adequate.  Up ~ 9 kg from preoperative weight. Received 2 doses of lasix 20mg-- will repeat IV lasix 20mg x 3 starting today.    -GI:  Tolerating diet, needs BM.  Continue bowel regimen.  -:  Discontinue shay catheter today.    -Endo: Preoperative A1C 5.5.  Completed insulin drip per protocol, transitioned to SSI yesterday  -FEN: Replaces lytes as needed.  Na:  139, K: 3.6    Active Diet Order      Moderate Consistent CHO Diet  -ID: Temp (24hrs), Av.1  F (37.3  C), Min:98.7  F (37.1  C), Max:99.6  F (37.6  C)  WBC: 9.3, Perioperative antibiotics completed  -Heme: Hgb: 10.4, Plt: 77. Acute blood loss anemia and thrombocytopenia 2/2 surgery. Will hold ASA and sub q heparin for thrombocytopenia  -Proph: PCD, BB, statin, PPI  -Dispo: Station 33, continue therapies, IS, cough, deep breathing, ambulation          Interval History:   No acute events overnight, pain controlled.  Sating well on room air.  Tolerating diet, needs BM         Medications:       metoprolol tartrate  37.5 mg Oral BID     famotidine  10 mg Oral BID     losartan  12.5 mg Oral Daily     insulin aspart  1-7 Units Subcutaneous TID AC     insulin aspart  1-5 Units Subcutaneous At Bedtime     atorvastatin (LIPITOR) tablet 10 mg  10 mg Oral Daily     sodium chloride (PF)  3 mL Intracatheter Q8H     mupirocin  0.5 g Both Nostrils BID     acetaminophen  975 mg Oral Q8H  "    @MEDRN-@          Physical Exam:   Vitals were reviewed  Blood pressure 121/68, pulse 94, temperature 99.6  F (37.6  C), temperature source Oral, resp. rate 18, height 1.778 m (5' 10\"), weight 108.3 kg (238 lb 12.8 oz), SpO2 99 %.  Rhythm: NSR    Lungs: Clear to ausculation, diminished in bases    Cardiovascular: RRR, S1, S2 present, no m/r/g    Abdomen: Soft, NTND    Extremeties: 1+ edema in bilateral lower extremities    Incision: CDI    CT: To suction    Weight:   Vitals:    02/13/18 1017 02/14/18 0600 02/15/18 0400 02/16/18 0500   Weight: 99.8 kg (220 lb) 106.6 kg (235 lb 0.2 oz) 109.3 kg (240 lb 15.4 oz) 108.3 kg (238 lb 12.8 oz)            Data:   Labs:   Lab Results   Component Value Date    WBC 9.3 02/16/2018     Lab Results   Component Value Date    RBC 3.40 02/16/2018     Lab Results   Component Value Date    HGB 10.4 02/16/2018     Lab Results   Component Value Date    HCT 31.7 02/16/2018     No components found for: MCT  Lab Results   Component Value Date    MCV 93 02/16/2018     Lab Results   Component Value Date    MCH 30.6 02/16/2018     Lab Results   Component Value Date    MCHC 32.8 02/16/2018     Lab Results   Component Value Date    RDW 14.2 02/16/2018     Lab Results   Component Value Date    PLT 77 02/16/2018       Last Basic Metabolic Panel:  Lab Results   Component Value Date     02/16/2018      Lab Results   Component Value Date    POTASSIUM 3.6 02/16/2018     Lab Results   Component Value Date    CHLORIDE 108 02/16/2018     Lab Results   Component Value Date    TAMARA 8.2 02/16/2018     Lab Results   Component Value Date    CO2 25 02/16/2018     Lab Results   Component Value Date    BUN 19 02/16/2018     Lab Results   Component Value Date    CR 0.78 02/16/2018     Lab Results   Component Value Date     02/16/2018       Yanet Berry NP student    Discussed plan and agree with this note-- Nicole Melara PA-C      "

## 2018-02-16 NOTE — PROGRESS NOTES
"NUTRITION ASSESSMENT      REASON FOR ASSESSMENT:  Cardiac Surgery Nutrition Consult    CURRENT DIET / INTAKE:  Moderate CHO    Visited with pt this morning  Just ordering breakfast - oatmeal, yogurt, banana  Reports fair appetite    2/13/18: HgbA1C 5.5    NUTRITION HISTORY:  Pt follows a regular diet at home  No allergies/restriction      ANTHROPOMETRICS:   Ht: 5'10\"  Wt: (2/13) 99.8 kg  BMI: 33.7  IBW: 75.5 kg  Weight Status: Obesity Grade I BMI 30-34.9  %IBW: 132%  Dosing Weight:  82 kg (adjusted wt for overwt)    ASSESSED NUTRITION NEEDS PER APPROVED PRACTICE GUIDELINES:    Estimated Energy Needs 1367-0160 kcals/day (25-30 kcals/kg)  -  overwt       Estimated Protein Needs 100-125 gms/day (1.2-1.5 gms/kg)  -  Post-op healing              MALNUTRITION:  Patient does not meet two of the following criteria necessary for diagnosing malnutrition:  significant weight loss, reduced intake, subcutaneous fat loss, muscle loss or fluid retention    NUTRITION DIAGNOSIS:   No nutrition diagnosis at this time    INTERVENTIONS:    Nutrition Prescription:  Moderate CHO    Implementation:  Assessed learning needs, learning preferences, and willingness to learn.  Nutrition Education (Content):  Discussed the importance of adequate nutrition post-op for healing and energy, emphasizing protein foods - encouraged pt to order a protein food with each meal      Medical Food Supplements:  As needed    Goals:  Patient to consume ~75% at meals   Patient verbalizes understanding of diet by listing two reasons why adequate nutrition is important post op.  Goals met during the education session    Follow Up/Monitoring (InPatient):  Food and Fluid intake -  Monitor for adequacy    Follow Up/Monitoring (OutPatient):  Patient will participate in out-patient cardiac rehab and attend nutrition classes during the program    "

## 2018-02-16 NOTE — PROGRESS NOTES
Pt transferred to step down unit. Pt has no complaints at this time. Bp currently elevated , Metoprolol given and BP will be monitored by floor RN.

## 2018-02-16 NOTE — PROGRESS NOTES
[facility]    Hospitalist Progress Note    Date of Service (when I saw the patient): 02/16/2018    Assessment & Plan   Vincenzo Moss is a 67-year-old pleasant male with medical history significant for hypertension, hyperlipidemia, BPH, GERD, who presented to the Emergency Room with chest pain, throat tightness and headache who was found to have an ascending aortic aneurysm, size 6.5 cm.     Type A aortic dissection  Patient was urgently taken to OR urgently after presentation on 02/13/2018 to ER and was repaired with Hemashield interposition graft. The patient received blood product as above during Operating Room, remained intubated for 24 hours postoperatively, was successfully extubated 2/14/18.  - Post op management including fluid/diet, BP, pain control, catheter, prophylactic antibiotic and chest tubes per primary. - Patient using IS    Anemia, acute post op, likely blood loss, and thrombocytopenia  Patient presented with hemoglobin of 14.6 and normal platelet count of 151,000. Dropped his hemoglobin and platelet significantly but suspect this is due to consumption and blood loss from surgery.  The patient has received blood products during surgery including 2 units of FFP, 2 units of platelet, 1 pack of cryoprecipitate and 1 pack of PRBC intraoperatively. Also Factor VII were also given for hemostasis.   - Hemoglobin has been fairly stable at range of 9-10.     - Platelet count has dropped to below 50%, could be due to consumption, possibility of HIT remains as he was exposed to heparin during surgery, since it is improving, now 77 today, no further work up.  - No signs of bleeding at this point. Monitor.     Cardiogenic shock likely due to dissecting aortic aneurysm  The patient was maintained on vasopressor but he is off all vasopressors.       Postoperative fever  The patient had temperature of 101.3 postoperative day #1. He has completed prophylactic antibiotics. Blood cultures drawn  from 2 days ago remain negative.     - Chest x-ray finding suggestive of atelectasis.   - No signs of infection clinically  - Fever subsided  - Encourage incentive spirometry.     BPH  The patient had TURP done in 12/2016.  He has not had an issue with urinary retention since then.   -has shay cath post op, planning to remove in am  - He should have bladder scan every shift to monitor for any retention postoperatively.  He is not on any alpha blocker or alpha reductase inhibitor.      Hyperlipidemia and hypertension  The patient needs strict blood pressure control given his aneurysm and status post repair.  Blood pressure managed by CT Surgery.       Hyperglycemia likely due to stress.  His A1c was 5.5.  The patient was maintained on insulin drip per protocol for 48 hours postoperatively and transitioned to sliding scale insulin.   - BS in low 100s.    Gastroesophageal reflux disease.     Constipation: has PRN senokot.    DVT Prophylaxis: Defer to primary service    Code Status: Full Code    Disposition: Expected discharge: defer to primary. Discussed with patient and his wife.    Theresa Grant MD  Hospitalist    Interval History   Patient was seen and examined, denies dyspnea. Post op pain well controlled.  -working on IS.  -no nausea or vomiting, no BM since admission.  -felt dizzy when got up first to walk, but subsided.     -Data reviewed today: I reviewed all new labs and imaging results over the last 24 hours. I personally reviewed no images or EKG's today.    Physical Exam   Temp: 99.6  F (37.6  C) Temp src: Oral BP: 121/68   Heart Rate: 74 Resp: 18 SpO2: 99 % O2 Device: None (Room air) Oxygen Delivery: 2 LPM  Vitals:    02/14/18 0600 02/15/18 0400 02/16/18 0500   Weight: 106.6 kg (235 lb 0.2 oz) 109.3 kg (240 lb 15.4 oz) 108.3 kg (238 lb 12.8 oz)     Vital Signs with Ranges  Temp:  [98.7  F (37.1  C)-99.6  F (37.6  C)] 99.6  F (37.6  C)  Heart Rate:  [69-80] 74  Resp:  [10-29] 18  BP: (117-154)/(68-85)  121/68  SpO2:  [94 %-99 %] 99 %  I/O last 3 completed shifts:  In: 792.85 [P.O.:540; I.V.:252.85]  Out: 2705 [Urine:2485; Chest Tube:220]    Constitutional: Alert, awake. Not in distress.   HEENT: PERRLA EOMI  Chest/Respiratory: Bilateral equal air entry, fine basilar crepts, no wheezing. Normal work of breathing.   -sternotomy wound clean.  Chest tubes in place.  Cardiovascular: Regular s1s2, no murmur, rub or gallop. No tachycardia.  GI: Soft, non distended, non tender, bowel tones active.  Skin/Integumen: No rash, no blister.    Medications     Reason beta blocker order not selected       IV fluid REPLACEMENT ONLY         metoprolol tartrate  37.5 mg Oral BID     famotidine  10 mg Oral BID     losartan  12.5 mg Oral Daily     insulin aspart  1-7 Units Subcutaneous TID AC     insulin aspart  1-5 Units Subcutaneous At Bedtime     atorvastatin (LIPITOR) tablet 10 mg  10 mg Oral Daily     sodium chloride (PF)  3 mL Intracatheter Q8H     mupirocin  0.5 g Both Nostrils BID     acetaminophen  975 mg Oral Q8H       Data     Recent Labs  Lab 02/16/18  0739 02/15/18  0420 02/14/18  0555 02/13/18  2200  02/13/18  2050  02/13/18  1226 02/13/18  1015  02/13/18  0957   WBC 9.3 9.9 8.2 8.6  --  17.8*  < >  --   --   --  11.9*   HGB 10.4* 9.0* 10.3* 10.1*  < > 9.0*  < >  --   --   < > 14.6   MCV 93 93 90 91  --  92  < >  --   --   --  92   PLT 77* 66* 80* 67*  --  118*  < >  --   --   --  151   INR  --   --  0.93 0.86  --  0.82*  < >  --   --   --  1.04    144 144 145*  < > 147*  < >  --   --   < > 141   POTASSIUM 3.6 4.2 4.0 4.0  < > 3.3*  < >  --   --   < > 4.0   CHLORIDE 108 112* 113* 112*  --  112*  < >  --   --   --  106   CO2 25 26 25 21  --  20  < >  --   --   --  26   BUN 19 24 23 20  --  21  < >  --   --   --  20   CR 0.78 0.97 1.24 1.35*  --  1.40*  < >  --   --   --  1.47*   ANIONGAP 6 6 6 12  --  15*  < >  --   --   --  9   TAMARA 8.2* 7.4* 7.2* 7.2*  --  7.6*  < >  --   --   --  8.5   * 117* 119* 185*  --   209*  < >  --   --   --  140*   ALBUMIN  --  2.8*  --   --   --   --   --   --   --   --  4.0   PROTTOTAL  --  5.5*  --   --   --   --   --   --   --   --  7.2   BILITOTAL  --  0.7  --   --   --   --   --   --   --   --  0.8   ALKPHOS  --  40  --   --   --   --   --   --   --   --  70   ALT  --  31  --   --   --   --   --   --   --   --  47   AST  --  40  --   --   --   --   --   --   --   --  35   TROPI  --   --   --   --   --   --   --  0.018  --   --  <0.015   TROPONIN  --   --   --   --   --   --   --   --  0.00  --   --    < > = values in this interval not displayed.    No results found for this or any previous visit (from the past 24 hour(s)).

## 2018-02-17 ENCOUNTER — APPOINTMENT (OUTPATIENT)
Dept: PHYSICAL THERAPY | Facility: CLINIC | Age: 68
DRG: 219 | End: 2018-02-17
Payer: MEDICARE

## 2018-02-17 LAB
ANION GAP SERPL CALCULATED.3IONS-SCNC: 9 MMOL/L (ref 3–14)
BASOPHILS # BLD AUTO: 0 10E9/L (ref 0–0.2)
BASOPHILS NFR BLD AUTO: 0.1 %
BLD PROD TYP BPU: NORMAL
BLD UNIT ID BPU: 0
BLOOD PRODUCT CODE: NORMAL
BPU ID: NORMAL
BUN SERPL-MCNC: 19 MG/DL (ref 7–30)
CALCIUM SERPL-MCNC: 8.3 MG/DL (ref 8.5–10.1)
CHLORIDE SERPL-SCNC: 108 MMOL/L (ref 94–109)
CO2 SERPL-SCNC: 24 MMOL/L (ref 20–32)
CREAT SERPL-MCNC: 0.77 MG/DL (ref 0.66–1.25)
DIFFERENTIAL METHOD BLD: ABNORMAL
EOSINOPHIL # BLD AUTO: 0.2 10E9/L (ref 0–0.7)
EOSINOPHIL NFR BLD AUTO: 2.8 %
ERYTHROCYTE [DISTWIDTH] IN BLOOD BY AUTOMATED COUNT: 13.7 % (ref 10–15)
GFR SERPL CREATININE-BSD FRML MDRD: >90 ML/MIN/1.7M2
GLUCOSE BLDC GLUCOMTR-MCNC: 111 MG/DL (ref 70–99)
GLUCOSE BLDC GLUCOMTR-MCNC: 127 MG/DL (ref 70–99)
GLUCOSE BLDC GLUCOMTR-MCNC: 68 MG/DL (ref 70–99)
GLUCOSE BLDC GLUCOMTR-MCNC: 90 MG/DL (ref 70–99)
GLUCOSE SERPL-MCNC: 162 MG/DL (ref 70–99)
HCT VFR BLD AUTO: 30.2 % (ref 40–53)
HGB BLD-MCNC: 10.1 G/DL (ref 13.3–17.7)
IMM GRANULOCYTES # BLD: 0 10E9/L (ref 0–0.4)
IMM GRANULOCYTES NFR BLD: 0.6 %
LYMPHOCYTES # BLD AUTO: 0.5 10E9/L (ref 0.8–5.3)
LYMPHOCYTES NFR BLD AUTO: 7.4 %
MCH RBC QN AUTO: 30.6 PG (ref 26.5–33)
MCHC RBC AUTO-ENTMCNC: 33.4 G/DL (ref 31.5–36.5)
MCV RBC AUTO: 92 FL (ref 78–100)
MONOCYTES # BLD AUTO: 0.6 10E9/L (ref 0–1.3)
MONOCYTES NFR BLD AUTO: 8.5 %
NEUTROPHILS # BLD AUTO: 5.8 10E9/L (ref 1.6–8.3)
NEUTROPHILS NFR BLD AUTO: 80.6 %
NRBC # BLD AUTO: 0 10*3/UL
NRBC BLD AUTO-RTO: 0 /100
PLATELET # BLD AUTO: 125 10E9/L (ref 150–450)
POTASSIUM SERPL-SCNC: 3.5 MMOL/L (ref 3.4–5.3)
RBC # BLD AUTO: 3.3 10E12/L (ref 4.4–5.9)
SODIUM SERPL-SCNC: 141 MMOL/L (ref 133–144)
TRANSFUSION STATUS PATIENT QL: NORMAL
TRANSFUSION STATUS PATIENT QL: NORMAL
WBC # BLD AUTO: 7.2 10E9/L (ref 4–11)

## 2018-02-17 PROCEDURE — A9270 NON-COVERED ITEM OR SERVICE: HCPCS | Mod: GY | Performed by: PHYSICIAN ASSISTANT

## 2018-02-17 PROCEDURE — 00000146 ZZHCL STATISTIC GLUCOSE BY METER IP

## 2018-02-17 PROCEDURE — A9270 NON-COVERED ITEM OR SERVICE: HCPCS | Mod: GY | Performed by: THORACIC SURGERY (CARDIOTHORACIC VASCULAR SURGERY)

## 2018-02-17 PROCEDURE — 25000128 H RX IP 250 OP 636: Performed by: PHYSICIAN ASSISTANT

## 2018-02-17 PROCEDURE — 99232 SBSQ HOSP IP/OBS MODERATE 35: CPT | Performed by: INTERNAL MEDICINE

## 2018-02-17 PROCEDURE — 40000193 ZZH STATISTIC PT WARD VISIT: Performed by: PHYSICAL THERAPIST

## 2018-02-17 PROCEDURE — 25000128 H RX IP 250 OP 636: Performed by: THORACIC SURGERY (CARDIOTHORACIC VASCULAR SURGERY)

## 2018-02-17 PROCEDURE — 97110 THERAPEUTIC EXERCISES: CPT | Mod: GP | Performed by: PHYSICAL THERAPIST

## 2018-02-17 PROCEDURE — 25000132 ZZH RX MED GY IP 250 OP 250 PS 637: Mod: GY | Performed by: THORACIC SURGERY (CARDIOTHORACIC VASCULAR SURGERY)

## 2018-02-17 PROCEDURE — 85025 COMPLETE CBC W/AUTO DIFF WBC: CPT | Performed by: HOSPITALIST

## 2018-02-17 PROCEDURE — 80048 BASIC METABOLIC PNL TOTAL CA: CPT | Performed by: HOSPITALIST

## 2018-02-17 PROCEDURE — 25000132 ZZH RX MED GY IP 250 OP 250 PS 637: Mod: GY | Performed by: PHYSICIAN ASSISTANT

## 2018-02-17 PROCEDURE — 12000007 ZZH R&B INTERMEDIATE

## 2018-02-17 PROCEDURE — 97530 THERAPEUTIC ACTIVITIES: CPT | Mod: GP | Performed by: PHYSICAL THERAPIST

## 2018-02-17 PROCEDURE — 36415 COLL VENOUS BLD VENIPUNCTURE: CPT | Performed by: HOSPITALIST

## 2018-02-17 RX ORDER — ASPIRIN 81 MG/1
81 TABLET, CHEWABLE ORAL DAILY
Status: DISCONTINUED | OUTPATIENT
Start: 2018-02-17 | End: 2018-02-19 | Stop reason: HOSPADM

## 2018-02-17 RX ORDER — METOPROLOL TARTRATE 50 MG
50 TABLET ORAL 2 TIMES DAILY
Status: DISCONTINUED | OUTPATIENT
Start: 2018-02-17 | End: 2018-02-19 | Stop reason: HOSPADM

## 2018-02-17 RX ORDER — HEPARIN SODIUM 5000 [USP'U]/.5ML
5000 INJECTION, SOLUTION INTRAVENOUS; SUBCUTANEOUS EVERY 8 HOURS
Status: DISCONTINUED | OUTPATIENT
Start: 2018-02-17 | End: 2018-02-19 | Stop reason: HOSPADM

## 2018-02-17 RX ADMIN — ASPIRIN 81 MG 81 MG: 81 TABLET ORAL at 14:16

## 2018-02-17 RX ADMIN — HEPARIN SODIUM 5000 UNITS: 5000 INJECTION, SOLUTION INTRAVENOUS; SUBCUTANEOUS at 21:34

## 2018-02-17 RX ADMIN — Medication 37.5 MG: at 08:29

## 2018-02-17 RX ADMIN — POTASSIUM CHLORIDE 20 MEQ: 1500 TABLET, EXTENDED RELEASE ORAL at 14:16

## 2018-02-17 RX ADMIN — FAMOTIDINE 10 MG: 10 TABLET, FILM COATED ORAL at 08:29

## 2018-02-17 RX ADMIN — ACETAMINOPHEN 650 MG: 325 TABLET, FILM COATED ORAL at 20:43

## 2018-02-17 RX ADMIN — HEPARIN SODIUM 5000 UNITS: 5000 INJECTION, SOLUTION INTRAVENOUS; SUBCUTANEOUS at 14:16

## 2018-02-17 RX ADMIN — ACETAMINOPHEN 650 MG: 325 TABLET, FILM COATED ORAL at 08:34

## 2018-02-17 RX ADMIN — ONDANSETRON 4 MG: 2 INJECTION INTRAMUSCULAR; INTRAVENOUS at 07:57

## 2018-02-17 RX ADMIN — FUROSEMIDE 20 MG: 10 INJECTION, SOLUTION INTRAVENOUS at 08:29

## 2018-02-17 RX ADMIN — Medication 25 MG: at 08:29

## 2018-02-17 RX ADMIN — Medication 12.5 MG: at 11:58

## 2018-02-17 RX ADMIN — MUPIROCIN 0.5 G: 20 OINTMENT TOPICAL at 21:40

## 2018-02-17 RX ADMIN — FAMOTIDINE 10 MG: 10 TABLET, FILM COATED ORAL at 21:34

## 2018-02-17 RX ADMIN — METOPROLOL TARTRATE 50 MG: 50 TABLET ORAL at 21:34

## 2018-02-17 RX ADMIN — ATORVASTATIN CALCIUM 10 MG: 10 TABLET, FILM COATED ORAL at 08:30

## 2018-02-17 RX ADMIN — FUROSEMIDE 20 MG: 10 INJECTION, SOLUTION INTRAVENOUS at 17:44

## 2018-02-17 NOTE — PLAN OF CARE
Problem: Cardiac Surgery/Open with Cardiopulmonary Bypass (Infant)  Goal: Signs and Symptoms of Listed Potential Problems Will be Absent, Minimized or Managed (Cardiac Surgery/Open with Cardiopulmonary Bypass)  Signs and symptoms of listed potential problems will be absent, minimized or managed by discharge/transition of care (reference Cardiac Surgery/Open with Cardiopulmonary Bypass (Infant) CPG).   Outcome: Improving  Patient is alert and orientated X 4, up with assistance of one, vital signs stable, denies pain, chest tube output 50 ML, shay removed at 06:30, due to void.

## 2018-02-17 NOTE — PROGRESS NOTES
[facility]  Cardiovascular and Thoracic Surgery Daily Note          Assessment and Plan:   POD#4 s/p Emergent type A aortic dissection repair with a 34 mm Hemashield interposition graft, intraoperative OLI  -CVS: HR and BP with room for increased BP control and beta blockade. Cozaar 25 mg, metoprolol increased to 50 mg  Wires and tubes removed on POD# 4  -Resp: Extubated within 24 hours of surgery.  Saturating well on room air.  Continue to encourage IS, cough, deep breathing, ambulation  -Neuro:  Grossly intact.  Pain controlled  -Renal: Urine output adequate.  Up ~ 9 kg from preoperative weight. Received 2 doses of lasix 20mg-- will repeat IV lasix 20mg x 3    -GI:  Tolerating diet, needs BM.  Continue bowel regimen.  -:  Miller removed, voiding spont.  -Endo: Preoperative A1C 5.5.  Completed insulin drip per protocol, transitioned to SSI   -FEN: Replaces lytes as needed.  Na:  139, K: 3.6    Active Diet Order      Moderate Consistent CHO Diet  -ID: Temp (24hrs), Av.1  F (37.3  C), Min:98.7  F (37.1  C), Max:99.6  F (37.6  C)   Perioperative antibiotics completed  -Heme: acute blood loss anemia, Hg stable, monitor daily. Thrombocytopenia resolved. Resume HSQ and ASA today  -Proph: PCD, BB, statin, PPI  -Dispo: Station 33, continue therapies, IS, cough, deep breathing, ambulation          Interval History:   No acute events overnight, pain controlled. Afebrile. Ambulating. Denies BM         Medications:       metoprolol tartrate  50 mg Oral BID     furosemide  20 mg Intravenous BID     losartan  25 mg Oral Daily     famotidine  10 mg Oral BID     insulin aspart  1-7 Units Subcutaneous TID AC     insulin aspart  1-5 Units Subcutaneous At Bedtime     atorvastatin (LIPITOR) tablet 10 mg  10 mg Oral Daily     sodium chloride (PF)  3 mL Intracatheter Q8H     mupirocin  0.5 g Both Nostrils BID     @[email or web address]-@          Physical Exam:   Vitals were reviewed  Blood pressure 127/70, pulse 86, temperature  "98.6  F (37  C), temperature source Oral, resp. rate 16, height 1.778 m (5' 10\"), weight 108.5 kg (239 lb 3.2 oz), SpO2 92 %.  Rhythm: NSR    Lungs: Clear to ausculation, diminished in bases    Cardiovascular: RRR, S1, S2 present, no m/r/g    Abdomen: Soft, NTND    Extremeties: 1+ edema in bilateral lower extremities    Incision: CDI    CT: serosanguinous    Weight:   Vitals:    02/13/18 1017 02/14/18 0600 02/15/18 0400 02/16/18 0500   Weight: 99.8 kg (220 lb) 106.6 kg (235 lb 0.2 oz) 109.3 kg (240 lb 15.4 oz) 108.3 kg (238 lb 12.8 oz)    02/17/18 0655   Weight: 108.5 kg (239 lb 3.2 oz)            Data:   Labs:   Lab Results   Component Value Date    WBC 9.3 02/16/2018     Lab Results   Component Value Date    RBC 3.40 02/16/2018     Lab Results   Component Value Date    HGB 10.4 02/16/2018     Lab Results   Component Value Date    HCT 31.7 02/16/2018     No components found for: MCT  Lab Results   Component Value Date    MCV 93 02/16/2018     Lab Results   Component Value Date    MCH 30.6 02/16/2018     Lab Results   Component Value Date    MCHC 32.8 02/16/2018     Lab Results   Component Value Date    RDW 14.2 02/16/2018     Lab Results   Component Value Date    PLT 77 02/16/2018       Last Basic Metabolic Panel:  Lab Results   Component Value Date     02/16/2018      Lab Results   Component Value Date    POTASSIUM 3.6 02/16/2018     Lab Results   Component Value Date    CHLORIDE 108 02/16/2018     Lab Results   Component Value Date    TAMARA 8.2 02/16/2018     Lab Results   Component Value Date    CO2 25 02/16/2018     Lab Results   Component Value Date    BUN 19 02/16/2018     Lab Results   Component Value Date    CR 0.78 02/16/2018     Lab Results   Component Value Date     02/16/2018     Will Lopez MD  Cardiothoracic Surgery Fellow  [phone]        "

## 2018-02-17 NOTE — PLAN OF CARE
Problem: Patient Care Overview  Goal: Plan of Care/Patient Progress Review  Discharge Planner PT   Patient plan for discharge: none stated  Current status: Min A x 1 for supine <> sit with HOB flat and cues for log roll with sternal precautions. SBA for sit <> stand without UE support to maintain sternal precautions, no LOB or instability. Ambulated 225 ft x 2 for aerobic exercise with SBA-CGA and use of FWW, declined progression of gait without AD today but appropriately follows cues to avoid UE weight bearing with use of AD for balance/comfort only.  Reports 6/10 OMNI effort level with gait, BP stable pre/post ambulation with SBP < 130 (see VS FS for details).  Barriers to return to prior living situation: Has not yet attempted stairs, assist needed for bed mobility  Recommendations for discharge: home with wife supervision on stairs and with ambulation, may need CGA for bed mobility as well  Rationale for recommendations: pt progressing appropriately with PT/CR with increased indep and activity tolerance however continues to require A x 1 for safety with transfers and gait. Pending safe stair trial this pm would be appropriate to discharge home if wife can provide supervision-CGA.       Entered by: Amanda Braga 02/17/2018 11:35 AM

## 2018-02-17 NOTE — PLAN OF CARE
Problem: Patient Care Overview  Goal: Plan of Care/Patient Progress Review  Outcome: Improving  Pt up with assist of one in [place], chest tubes removed this shift, dressing clean dry and intact, lungs clear, O2 sats mid 90's on room air, incision clean dry and intact, oral tylenol for oain, good urine output, did have BM this shift, tolerates diet,

## 2018-02-17 NOTE — PROGRESS NOTES
[facility]  Hospitalist Progress Note  Santos Wallace MD 02/17/2018           Assessment and Plan:        Vincenzo Moss is a 67-year-old pleasant male with medical history significant for hypertension, hyperlipidemia, BPH, GERD, who presented to the Emergency Room with chest pain, throat tightness and headache who was found to have an ascending aortic aneurysm, size 6.5 cm.      Type A aortic dissection  Patient was urgently taken to OR urgently after presentation on 02/13/2018 to ER and was repaired with Hemashield interposition graft. The patient received blood product as above during Operating Room, remained intubated for 24 hours postoperatively, was successfully extubated 2/14/18.  - Post op management including fluid/diet, BP, pain control, catheter, prophylactic antibiotic and chest tubes per primary. - Patient using IS     Anemia, acute post op, likely blood loss, and thrombocytopenia  Patient presented with hemoglobin of 14.6 and normal platelet count of 151,000. Dropped his hemoglobin and platelet significantly but suspect this is due to consumption and blood loss from surgery.  The patient has received blood products during surgery including 2 units of FFP, 2 units of platelet, 1 pack of cryoprecipitate and 1 pack of PRBC intraoperatively. Also Factor VII were also given for hemostasis.   - Hemoglobin has been fairly stable at range of 9-10.     - Platelet count has dropped to below 50%, could be due to consumption, possibility of HIT remains as he was exposed to heparin during surgery, since it is improving, now 125  today, no further work up.  - No signs of bleeding at this point. Monitor.      Cardiogenic shock likely due to dissecting aortic aneurysm  The patient was maintained on vasopressor but he is off all vasopressors.        Postoperative fever  The patient had temperature of 101.3 postoperative day #1. He has completed prophylactic antibiotics. Blood cultures drawn  "from 2 days ago remain negative.     - Chest x-ray finding suggestive of atelectasis.   - No signs of infection clinically  - Fever subsided  - Encourage incentive spirometry.      BPH  The patient had TURP done in 12/2016.  He has not had an issue with urinary retention since then.   -has shay cath post op, removed and able to urinate   - He should have bladder scan every shift to monitor for any retention postoperatively.  He is not on any alpha blocker or alpha reductase inhibitor.       Hyperlipidemia and hypertension  The patient needs strict blood pressure control given his aneurysm and status post repair.  Blood pressure managed by CT Surgery.        Hyperglycemia likely due to stress.  His A1c was 5.5.  The patient was maintained on insulin drip per protocol for 48 hours postoperatively and transitioned to sliding scale insulin.   - BS in low 100s.     Gastroesophageal reflux disease.      Constipation: has PRN senokot.     DVT Prophylaxis: Defer to primary service     Code Status: Full Code     Disposition: Expected discharge: defer to primary.        Interval History (Subjective):      Feels well. Has incisional chest pain with body movements. No fever, SOB. Mild cough.                   Physical Exam:      Last Vital Signs:  /70  Pulse 86  Temp 98.8  F (37.1  C) (Axillary)  Resp 16  Ht 1.778 m (5' 10\")  Wt 108.5 kg (239 lb 3.2 oz)  SpO2 91%  BMI 34.32 kg/m2      Intake/Output Summary (Last 24 hours) at 02/17/18 1048  Last data filed at 02/17/18 0655   Gross per 24 hour   Intake             1500 ml   Output             2660 ml   Net            -1160 ml       Constitutional: Awake, alert, cooperative, no apparent distress   Respiratory: Clear to auscultation bilaterally, no crackles or wheezing   Cardiovascular: Regular rate and rhythm, normal S1 and S2, and no murmur noted   Abdomen: Normal bowel sounds, soft, non-distended, non-tender   Skin: No rashes, no cyanosis, dry to touch   Neuro: " Alert and oriented x3, no weakness, numbness, memory loss   Extremities: No edema, normal range of motion   Other(s): Post op incision is clean, healing       All other systems: Negative          Medications:      All current medications were reviewed with changes reflected in problem list.         Data:      All new lab and imaging data was reviewed.   Labs:    Recent Labs  Lab 02/17/18  0855 02/16/18  0739 02/15/18  0420   WBC 7.2 9.3 9.9   HGB 10.1* 10.4* 9.0*   HCT 30.2* 31.7* 27.7*   MCV 92 93 93   * 77* 66*       Recent Labs  Lab 02/17/18  0855 02/16/18  0739 02/15/18  0420 02/14/18  0555  02/13/18  0957    139 144 144  < > 141   POTASSIUM 3.5 3.6 4.2 4.0  < > 4.0   CHLORIDE 108 108 112* 113*  < > 106   CO2 24 25 26 25  < > 26   ANIONGAP 9 6 6 6  < > 9   * 110* 117* 119*  < > 140*   BUN 19 19 24 23  < > 20   CR 0.77 0.78 0.97 1.24  < > 1.47*   GFRESTIMATED >90 >90 77 58*  < > 48*   GFRESTBLACK >90 >90 >90 70  < > 58*   TAMARA 8.3* 8.2* 7.4* 7.2*  < > 8.5   MAG  --   --   --  1.9  --   --    PHOS  --   --   --  2.1*  --   --    PROTTOTAL  --   --  5.5*  --   --  7.2   ALBUMIN  --   --  2.8*  --   --  4.0   BILITOTAL  --   --  0.7  --   --  0.8   ALKPHOS  --   --  40  --   --  70   AST  --   --  40  --   --  35   ALT  --   --  31  --   --  47   < > = values in this interval not displayed.   Imaging:   No results found for this or any previous visit (from the past 24 hour(s)).

## 2018-02-18 ENCOUNTER — APPOINTMENT (OUTPATIENT)
Dept: PHYSICAL THERAPY | Facility: CLINIC | Age: 68
DRG: 219 | End: 2018-02-18
Payer: MEDICARE

## 2018-02-18 LAB
ANION GAP SERPL CALCULATED.3IONS-SCNC: 8 MMOL/L (ref 3–14)
BUN SERPL-MCNC: 17 MG/DL (ref 7–30)
CALCIUM SERPL-MCNC: 8.3 MG/DL (ref 8.5–10.1)
CHLORIDE SERPL-SCNC: 107 MMOL/L (ref 94–109)
CO2 SERPL-SCNC: 24 MMOL/L (ref 20–32)
CREAT SERPL-MCNC: 0.8 MG/DL (ref 0.66–1.25)
ERYTHROCYTE [DISTWIDTH] IN BLOOD BY AUTOMATED COUNT: 13.7 % (ref 10–15)
GFR SERPL CREATININE-BSD FRML MDRD: >90 ML/MIN/1.7M2
GLUCOSE BLDC GLUCOMTR-MCNC: 108 MG/DL (ref 70–99)
GLUCOSE BLDC GLUCOMTR-MCNC: 113 MG/DL (ref 70–99)
GLUCOSE BLDC GLUCOMTR-MCNC: 122 MG/DL (ref 70–99)
GLUCOSE BLDC GLUCOMTR-MCNC: 134 MG/DL (ref 70–99)
GLUCOSE BLDC GLUCOMTR-MCNC: 96 MG/DL (ref 70–99)
GLUCOSE SERPL-MCNC: 145 MG/DL (ref 70–99)
HCT VFR BLD AUTO: 29.9 % (ref 40–53)
HGB BLD-MCNC: 10 G/DL (ref 13.3–17.7)
MCH RBC QN AUTO: 30.5 PG (ref 26.5–33)
MCHC RBC AUTO-ENTMCNC: 33.4 G/DL (ref 31.5–36.5)
MCV RBC AUTO: 91 FL (ref 78–100)
PLATELET # BLD AUTO: 132 10E9/L (ref 150–450)
POTASSIUM SERPL-SCNC: 3.5 MMOL/L (ref 3.4–5.3)
RBC # BLD AUTO: 3.28 10E12/L (ref 4.4–5.9)
SODIUM SERPL-SCNC: 139 MMOL/L (ref 133–144)
WBC # BLD AUTO: 6.9 10E9/L (ref 4–11)

## 2018-02-18 PROCEDURE — 85027 COMPLETE CBC AUTOMATED: CPT | Performed by: INTERNAL MEDICINE

## 2018-02-18 PROCEDURE — 99232 SBSQ HOSP IP/OBS MODERATE 35: CPT | Performed by: HOSPITALIST

## 2018-02-18 PROCEDURE — 25000128 H RX IP 250 OP 636: Performed by: PHYSICIAN ASSISTANT

## 2018-02-18 PROCEDURE — 97530 THERAPEUTIC ACTIVITIES: CPT | Mod: GP | Performed by: PHYSICAL THERAPIST

## 2018-02-18 PROCEDURE — 25000132 ZZH RX MED GY IP 250 OP 250 PS 637: Mod: GY | Performed by: PHYSICIAN ASSISTANT

## 2018-02-18 PROCEDURE — 12000007 ZZH R&B INTERMEDIATE

## 2018-02-18 PROCEDURE — A9270 NON-COVERED ITEM OR SERVICE: HCPCS | Mod: GY | Performed by: PHYSICIAN ASSISTANT

## 2018-02-18 PROCEDURE — 00000146 ZZHCL STATISTIC GLUCOSE BY METER IP

## 2018-02-18 PROCEDURE — 99207 ZZC CDG-MDM COMPONENT: MEETS MODERATE - UP CODED: CPT | Performed by: HOSPITALIST

## 2018-02-18 PROCEDURE — 40000193 ZZH STATISTIC PT WARD VISIT: Performed by: PHYSICAL THERAPIST

## 2018-02-18 PROCEDURE — 97110 THERAPEUTIC EXERCISES: CPT | Mod: GP | Performed by: PHYSICAL THERAPIST

## 2018-02-18 PROCEDURE — 25000125 ZZHC RX 250: Performed by: PHYSICIAN ASSISTANT

## 2018-02-18 PROCEDURE — 80048 BASIC METABOLIC PNL TOTAL CA: CPT | Performed by: INTERNAL MEDICINE

## 2018-02-18 PROCEDURE — A9270 NON-COVERED ITEM OR SERVICE: HCPCS | Mod: GY | Performed by: THORACIC SURGERY (CARDIOTHORACIC VASCULAR SURGERY)

## 2018-02-18 PROCEDURE — 25000128 H RX IP 250 OP 636: Performed by: THORACIC SURGERY (CARDIOTHORACIC VASCULAR SURGERY)

## 2018-02-18 PROCEDURE — 36415 COLL VENOUS BLD VENIPUNCTURE: CPT | Performed by: INTERNAL MEDICINE

## 2018-02-18 PROCEDURE — 25000132 ZZH RX MED GY IP 250 OP 250 PS 637: Mod: GY | Performed by: THORACIC SURGERY (CARDIOTHORACIC VASCULAR SURGERY)

## 2018-02-18 RX ORDER — FUROSEMIDE 10 MG/ML
40 INJECTION INTRAMUSCULAR; INTRAVENOUS
Status: COMPLETED | OUTPATIENT
Start: 2018-02-18 | End: 2018-02-18

## 2018-02-18 RX ADMIN — ACETAMINOPHEN 650 MG: 325 TABLET, FILM COATED ORAL at 06:44

## 2018-02-18 RX ADMIN — HYDRALAZINE HYDROCHLORIDE 10 MG: 20 INJECTION INTRAMUSCULAR; INTRAVENOUS at 20:35

## 2018-02-18 RX ADMIN — ASPIRIN 81 MG 81 MG: 81 TABLET ORAL at 08:08

## 2018-02-18 RX ADMIN — HEPARIN SODIUM 5000 UNITS: 5000 INJECTION, SOLUTION INTRAVENOUS; SUBCUTANEOUS at 21:31

## 2018-02-18 RX ADMIN — METOPROLOL TARTRATE 50 MG: 50 TABLET ORAL at 08:08

## 2018-02-18 RX ADMIN — METOPROLOL TARTRATE 50 MG: 50 TABLET ORAL at 20:35

## 2018-02-18 RX ADMIN — ACETAMINOPHEN 650 MG: 325 TABLET, FILM COATED ORAL at 20:35

## 2018-02-18 RX ADMIN — ATORVASTATIN CALCIUM 10 MG: 10 TABLET, FILM COATED ORAL at 08:08

## 2018-02-18 RX ADMIN — HYDRALAZINE HYDROCHLORIDE 10 MG: 20 INJECTION INTRAMUSCULAR; INTRAVENOUS at 16:46

## 2018-02-18 RX ADMIN — MUPIROCIN 0.5 G: 20 OINTMENT TOPICAL at 08:11

## 2018-02-18 RX ADMIN — ONDANSETRON 4 MG: 4 TABLET, ORALLY DISINTEGRATING ORAL at 06:44

## 2018-02-18 RX ADMIN — Medication 25 MG: at 08:08

## 2018-02-18 RX ADMIN — FUROSEMIDE 40 MG: 10 INJECTION, SOLUTION INTRAVENOUS at 16:46

## 2018-02-18 RX ADMIN — FAMOTIDINE 10 MG: 10 TABLET, FILM COATED ORAL at 20:35

## 2018-02-18 RX ADMIN — HEPARIN SODIUM 5000 UNITS: 5000 INJECTION, SOLUTION INTRAVENOUS; SUBCUTANEOUS at 15:03

## 2018-02-18 RX ADMIN — FAMOTIDINE 10 MG: 10 TABLET, FILM COATED ORAL at 08:09

## 2018-02-18 RX ADMIN — FUROSEMIDE 40 MG: 10 INJECTION, SOLUTION INTRAVENOUS at 12:04

## 2018-02-18 RX ADMIN — HEPARIN SODIUM 5000 UNITS: 5000 INJECTION, SOLUTION INTRAVENOUS; SUBCUTANEOUS at 06:37

## 2018-02-18 NOTE — PLAN OF CARE
Problem: Patient Care Overview  Goal: Plan of Care/Patient Progress Review  Outcome: Improving  Pt up in chair most of this shift, up in millard with stand by assist, tolerates diet, denies the need for pain medications, lungs clear, good urine output, passing flatus, + BM this shift, incision clean dry and intact, chest tubes removed yesterday, due to shower today

## 2018-02-18 NOTE — PLAN OF CARE
Problem: Patient Care Overview  Goal: Plan of Care/Patient Progress Review  Discharge Planner PT   Patient plan for discharge: home with family assist as needed, OP Phase II CR (if qualifying diagnosis) otherwise would like OP PT  Current status: CGA-min A for supine <> sit within sternal precautions, supervision for sit <> stand, no cues needed for technique with precautions. Education provided to pt on precautions, activity restrictions post surgery, signs/symptoms of activity intolerance and aerobic exercise recs. Ambulated 75 ft x 2 without AD, occasional 1 UE support on handrail in hallways for stability but no observed LOB. Completed up/down 9 stairs with 1 railing. VSS with all activity with SBP < 130 and no CP (see VS FS for details).  Barriers to return to prior living situation: none  Recommendations for discharge: OP Phase II CR (if qualifying diagnosis) otherwise would like OP PT  Rationale for recommendations: would benefit from continued skilled therapy intervention in OP setting to progress functional aerobic endurance, higher level balance and strengthening for return to PLOF       Entered by: Amanda Braga 02/18/2018 1:14 PM         Addendum: PM session limited by elevated BPs-  in supine, up to 146 after transfer to sit EOB and completing 3 seated LE calisthenics so deferred further ambulation or aerobic exercise this pm and RN notified.

## 2018-02-18 NOTE — PLAN OF CARE
Problem: Cardiac Surgery/Open with Cardiopulmonary Bypass (Infant)  Goal: Signs and Symptoms of Listed Potential Problems Will be Absent, Minimized or Managed (Cardiac Surgery/Open with Cardiopulmonary Bypass)  Signs and symptoms of listed potential problems will be absent, minimized or managed by discharge/transition of care (reference Cardiac Surgery/Open with Cardiopulmonary Bypass (Infant) CPG).   A/O, VSS.  Sternal incision CDI with liquid bandage.  Lung sounds dim, basilar fine crackles.  Tele is SR.  Up with SBA.  Denies pain, declines offer of pain meds.

## 2018-02-18 NOTE — PROGRESS NOTES
[facility]  Cardiovascular and Thoracic Surgery Daily Note          Assessment and Plan:   POD#5 s/p Emergent type A aortic dissection repair with a 34 mm Hemashield interposition graft, intraoperative OLI  -CVS: HR 70-80s. -140s. Metoprolol increased yesterday to 50mg BID. Losartan 25mg. Wires and tubes removed on POD# 4. Statin.   -Resp: Extubated within 24 hours of surgery. Saturating well on room air. Continue to encourage IS, cough, deep breathing, ambulation  -Neuro:  Grossly intact. Pain controlled.  -Renal: Urine output adequate. Lasix 40mg BID today; continues to be 14lb up from admit.  -GI:  Tolerating diet, +BM yesterday.  Continue bowel regimen.  -:  Miller removed, voiding spont. Diuresis as above.   -Endo: Preoperative A1C 5.5.  Completed insulin drip per protocol, transitioned to SSI   -FEN: Replaces lytes as needed.  Na:  139, K: 3.5    Active Diet Order      Moderate Consistent CHO Diet  -ID: WBC 6.9, afebrile. No concerns for infection. Perioperative antibiotics completed  -Heme: acute blood loss anemia, Hg stable (10), monitor daily. Thrombocytopenia resolved. HSQ and ASA.  -Proph: PCD, BB, statin, PPI  -Dispo: Station 33, continue therapies, IS, cough, deep breathing, ambulation          Interval History:   No acute events overnight. Has no pain. Afebrile. Ambulating. +BM yesterday x2. UOP adequate.          Medications:       furosemide  40 mg Intravenous BID     metoprolol tartrate  50 mg Oral BID     aspirin  81 mg Oral Daily     heparin  5,000 Units Subcutaneous Q8H     losartan  25 mg Oral Daily     famotidine  10 mg Oral BID     insulin aspart  1-7 Units Subcutaneous TID AC     insulin aspart  1-5 Units Subcutaneous At Bedtime     atorvastatin (LIPITOR) tablet 10 mg  10 mg Oral Daily     sodium chloride (PF)  3 mL Intracatheter Q8H     mupirocin  0.5 g Both Nostrils BID     @MEDSPRN-@          Physical Exam:   Vitals were reviewed  Blood pressure 117/67, pulse 75,  "temperature 98.6  F (37  C), temperature source Oral, resp. rate 16, height 1.778 m (5' 10\"), weight 106.5 kg (234 lb 12.6 oz), SpO2 93 %.  Rhythm: NSR    Lungs:Non-labored breathing on room air.     Cardiovascular: NSR on monitor.     Abdomen: Soft, NT, ND    Extremeties: Mild edema    Incision: CDI    CT: n/a    Weight:   Vitals:    02/14/18 0600 02/15/18 0400 02/16/18 0500 02/17/18 0655   Weight: 106.6 kg (235 lb 0.2 oz) 109.3 kg (240 lb 15.4 oz) 108.3 kg (238 lb 12.8 oz) 108.5 kg (239 lb 3.2 oz)    02/18/18 0635   Weight: 106.5 kg (234 lb 12.6 oz)            Data:     Lab Results   Component Value Date    WBC 6.9 02/18/2018     Lab Results   Component Value Date    RBC 3.28 02/18/2018     Lab Results   Component Value Date    HGB 10.0 02/18/2018     Lab Results   Component Value Date    HCT 29.9 02/18/2018     No components found for: MCT  Lab Results   Component Value Date    MCV 91 02/18/2018     Lab Results   Component Value Date    MCH 30.5 02/18/2018     Lab Results   Component Value Date    MCHC 33.4 02/18/2018     Lab Results   Component Value Date    RDW 13.7 02/18/2018     Lab Results   Component Value Date     02/18/2018     Last Basic Metabolic Panel:  Lab Results   Component Value Date     02/18/2018      Lab Results   Component Value Date    POTASSIUM 3.5 02/18/2018     Lab Results   Component Value Date    CHLORIDE 107 02/18/2018     Lab Results   Component Value Date    TAMARA 8.3 02/18/2018     Lab Results   Component Value Date    CO2 24 02/18/2018     Lab Results   Component Value Date    BUN 17 02/18/2018     Lab Results   Component Value Date    CR 0.80 02/18/2018     Lab Results   Component Value Date     02/18/2018       Patient discussed with Dr. Toledo.    ____  Jyoti Wild PA-C  Cardiothoracic Surgery  Pager: [phone]    "

## 2018-02-18 NOTE — PLAN OF CARE
Problem: Cardiac Surgery/Open with Cardiopulmonary Bypass (Infant)  Goal: Signs and Symptoms of Listed Potential Problems Will be Absent, Minimized or Managed (Cardiac Surgery/Open with Cardiopulmonary Bypass)  Signs and symptoms of listed potential problems will be absent, minimized or managed by discharge/transition of care (reference Cardiac Surgery/Open with Cardiopulmonary Bypass (Infant) CPG).   Outcome: Improving  Patient is alert and orientated X 4, up with assistance of one, vital signs stable, pain managed with Tylenol. Incision CDI.

## 2018-02-18 NOTE — PROGRESS NOTES
[facility]    Hospitalist Progress Note    Date of Service (when I saw the patient): 02/18/2018    Assessment & Plan   Vincenzo Moss is a 67-year-old pleasant male with medical history significant for hypertension, hyperlipidemia, BPH, GERD, who presented to the Emergency Room with chest pain, throat tightness and headache who was found to have an ascending aortic aneurysm, size 6.5 cm.     Type A aortic dissection  Patient was urgently taken to OR urgently after presentation on 02/13/2018 to ER and was repaired with Hemashield interposition graft. The patient received blood product as above during Operating Room, remained intubated for 24 hours postoperatively, was successfully extubated 2/14/18.  - Post op management including fluid/diet, BP, pain control, catheter, prophylactic antibiotic and chest tubes per primary.   - Patient using IS  - Lasix per primary    Anemia, acute post op, likely blood loss, and thrombocytopenia  Patient presented with hemoglobin of 14.6 and normal platelet count of 151,000. Dropped his hemoglobin and platelet significantly but suspect this is due to consumption and blood loss from surgery.  The patient has received blood products during surgery including 2 units of FFP, 2 units of platelet, 1 pack of cryoprecipitate and 1 pack of PRBC intraoperatively. Also Factor VII were also given for hemostasis.   - Hemoglobin has been fairly stable at range of 10.     - Platelet count has dropped to below 50%, could be due to consumption, unlikely HIT, it has gradually improved to near normal 132, no further work up. No signs of bleeding at this point.      Cardiogenic shock likely due to dissecting aortic aneurysm  The patient was maintained on vasopressor but he is off all vasopressors.       Postoperative fever  The patient had temperature of 101.3 postoperative day #1. He has completed prophylactic antibiotics. Blood cultures drawn from 2 days ago remain negative.     -  Chest x-ray finding suggestive of atelectasis.   - No signs of infection clinically  - Fever subsided, now 100.3 in last 24 hours, monitor.  - Encourage incentive spirometry.     BPH  The patient had TURP done in 12/2016.  He has not had an issue with urinary retention since then.   - Miller catheter removed, voiding adequately.      Hyperlipidemia and hypertension  The patient needs strict blood pressure control given his aneurysm and status post repair.  Blood pressure managed by CT Surgery.       Hyperglycemia likely due to stress.  His A1c was 5.5.  The patient was maintained on insulin drip per protocol for 48 hours postoperatively and transitioned to sliding scale insulin.   - BS in low 100s.    Gastroesophageal reflux disease.     Constipation: has PRN senokot.    DVT Prophylaxis: Defer to primary service    Code Status: Full Code    Disposition: Expected discharge: defer to primary. Discussed with patient and RN    Theresa Grant MD  Hospitalist    Interval History    - up in chair, feels congested in his lungs and some dyspnea (feels he can't take deep breaths). Post op pain well controlled.  - Tmax 100.3    -Data reviewed today: I reviewed all new labs results over the last 24 hours. I personally reviewed no images or EKG's today.    Physical Exam   Temp: 98.6  F (37  C) Temp src: Oral BP: 117/67 Pulse: 75 Heart Rate: 74 Resp: 16 SpO2: 93 % O2 Device: None (Room air)    Vitals:    02/16/18 0500 02/17/18 0655 02/18/18 0635   Weight: 108.3 kg (238 lb 12.8 oz) 108.5 kg (239 lb 3.2 oz) 106.5 kg (234 lb 12.6 oz)     Vital Signs with Ranges  Temp:  [98.6  F (37  C)-100.3  F (37.9  C)] 98.6  F (37  C)  Pulse:  [74-80] 75  Heart Rate:  [70-81] 74  Resp:  [10-18] 16  BP: (114-126)/(67-76) 117/67  SpO2:  [90 %-93 %] 93 %  I/O last 3 completed shifts:  In: 600 [P.O.:600]  Out: 2400 [Urine:2400]    Constitutional: Alert, awake. Up in chair, comfortable.   HEENT: PERRLA EOMI  Chest/Respiratory: Bilateral equal air  entry, fine basilar crepts noted, no wheezing. Normal work of breathing.   -sternotomy wound clean.    Cardiovascular: Regular s1s2, no murmur, rub or gallop. No tachycardia. Trace edema.  GI: Soft, non distended, non tender, bowel tones active.  Skin/Integumen: No rash, no blister.    Medications     Reason beta blocker order not selected       IV fluid REPLACEMENT ONLY         furosemide  40 mg Intravenous BID     metoprolol tartrate  50 mg Oral BID     aspirin  81 mg Oral Daily     heparin  5,000 Units Subcutaneous Q8H     losartan  25 mg Oral Daily     famotidine  10 mg Oral BID     insulin aspart  1-7 Units Subcutaneous TID AC     insulin aspart  1-5 Units Subcutaneous At Bedtime     atorvastatin (LIPITOR) tablet 10 mg  10 mg Oral Daily     sodium chloride (PF)  3 mL Intracatheter Q8H     mupirocin  0.5 g Both Nostrils BID       Data     Recent Labs  Lab 02/18/18  0815 02/17/18  0855 02/16/18  0739 02/15/18  0420 02/14/18  0555 02/13/18  2200  02/13/18  2050  02/13/18  1226 02/13/18  1015  02/13/18  0957   WBC 6.9 7.2 9.3 9.9 8.2 8.6  --  17.8*  < >  --   --   --  11.9*   HGB 10.0* 10.1* 10.4* 9.0* 10.3* 10.1*  < > 9.0*  < >  --   --   < > 14.6   MCV 91 92 93 93 90 91  --  92  < >  --   --   --  92   * 125* 77* 66* 80* 67*  --  118*  < >  --   --   --  151   INR  --   --   --   --  0.93 0.86  --  0.82*  < >  --   --   --  1.04    141 139 144 144 145*  < > 147*  < >  --   --   < > 141   POTASSIUM 3.5 3.5 3.6 4.2 4.0 4.0  < > 3.3*  < >  --   --   < > 4.0   CHLORIDE 107 108 108 112* 113* 112*  --  112*  < >  --   --   --  106   CO2 24 24 25 26 25 21  --  20  < >  --   --   --  26   BUN 17 19 19 24 23 20  --  21  < >  --   --   --  20   CR 0.80 0.77 0.78 0.97 1.24 1.35*  --  1.40*  < >  --   --   --  1.47*   ANIONGAP 8 9 6 6 6 12  --  15*  < >  --   --   --  9   TAMARA 8.3* 8.3* 8.2* 7.4* 7.2* 7.2*  --  7.6*  < >  --   --   --  8.5   * 162* 110* 117* 119* 185*  --  209*  < >  --   --   --  140*    ALBUMIN  --   --   --  2.8*  --   --   --   --   --   --   --   --  4.0   PROTTOTAL  --   --   --  5.5*  --   --   --   --   --   --   --   --  7.2   BILITOTAL  --   --   --  0.7  --   --   --   --   --   --   --   --  0.8   ALKPHOS  --   --   --  40  --   --   --   --   --   --   --   --  70   ALT  --   --   --  31  --   --   --   --   --   --   --   --  47   AST  --   --   --  40  --   --   --   --   --   --   --   --  35   TROPI  --   --   --   --   --   --   --   --   --  0.018  --   --  <0.015   TROPONIN  --   --   --   --   --   --   --   --   --   --  0.00  --   --    < > = values in this interval not displayed.    No results found for this or any previous visit (from the past 24 hour(s)).

## 2018-02-19 ENCOUNTER — APPOINTMENT (OUTPATIENT)
Dept: PHYSICAL THERAPY | Facility: CLINIC | Age: 68
DRG: 219 | End: 2018-02-19
Payer: MEDICARE

## 2018-02-19 VITALS
TEMPERATURE: 100.5 F | DIASTOLIC BLOOD PRESSURE: 63 MMHG | BODY MASS INDEX: 33.33 KG/M2 | WEIGHT: 232.81 LBS | OXYGEN SATURATION: 93 % | RESPIRATION RATE: 16 BRPM | HEART RATE: 75 BPM | HEIGHT: 70 IN | SYSTOLIC BLOOD PRESSURE: 117 MMHG

## 2018-02-19 PROBLEM — E87.70 FLUID OVERLOAD: Status: ACTIVE | Noted: 2018-02-19

## 2018-02-19 PROBLEM — R73.9 TRANSIENT HYPERGLYCEMIA POST PROCEDURE: Status: ACTIVE | Noted: 2018-02-19

## 2018-02-19 PROBLEM — D62 ANEMIA DUE TO BLOOD LOSS, ACUTE: Status: ACTIVE | Noted: 2018-02-19

## 2018-02-19 LAB
ANION GAP SERPL CALCULATED.3IONS-SCNC: 5 MMOL/L (ref 3–14)
BACTERIA SPEC CULT: NO GROWTH
BACTERIA SPEC CULT: NO GROWTH
BUN SERPL-MCNC: 14 MG/DL (ref 7–30)
CALCIUM SERPL-MCNC: 8.5 MG/DL (ref 8.5–10.1)
CHLORIDE SERPL-SCNC: 105 MMOL/L (ref 94–109)
CO2 SERPL-SCNC: 29 MMOL/L (ref 20–32)
CREAT SERPL-MCNC: 0.88 MG/DL (ref 0.66–1.25)
GFR SERPL CREATININE-BSD FRML MDRD: 87 ML/MIN/1.7M2
GLUCOSE BLDC GLUCOMTR-MCNC: 96 MG/DL (ref 70–99)
GLUCOSE BLDC GLUCOMTR-MCNC: 97 MG/DL (ref 70–99)
GLUCOSE SERPL-MCNC: 101 MG/DL (ref 70–99)
Lab: NORMAL
Lab: NORMAL
PLATELET # BLD AUTO: 152 10E9/L (ref 150–450)
POTASSIUM SERPL-SCNC: 3.5 MMOL/L (ref 3.4–5.3)
SODIUM SERPL-SCNC: 139 MMOL/L (ref 133–144)
SPECIMEN SOURCE: NORMAL
SPECIMEN SOURCE: NORMAL

## 2018-02-19 PROCEDURE — 25000132 ZZH RX MED GY IP 250 OP 250 PS 637: Mod: GY | Performed by: PHYSICIAN ASSISTANT

## 2018-02-19 PROCEDURE — 36415 COLL VENOUS BLD VENIPUNCTURE: CPT | Performed by: HOSPITALIST

## 2018-02-19 PROCEDURE — 99207 ZZC CDG-MDM COMPONENT: MEETS MODERATE - UP CODED: CPT | Performed by: HOSPITALIST

## 2018-02-19 PROCEDURE — 25000132 ZZH RX MED GY IP 250 OP 250 PS 637: Mod: GY | Performed by: THORACIC SURGERY (CARDIOTHORACIC VASCULAR SURGERY)

## 2018-02-19 PROCEDURE — A9270 NON-COVERED ITEM OR SERVICE: HCPCS | Mod: GY | Performed by: PHYSICIAN ASSISTANT

## 2018-02-19 PROCEDURE — 00000146 ZZHCL STATISTIC GLUCOSE BY METER IP

## 2018-02-19 PROCEDURE — 97530 THERAPEUTIC ACTIVITIES: CPT | Mod: GP | Performed by: PHYSICAL THERAPIST

## 2018-02-19 PROCEDURE — 40000193 ZZH STATISTIC PT WARD VISIT: Performed by: PHYSICAL THERAPIST

## 2018-02-19 PROCEDURE — 97110 THERAPEUTIC EXERCISES: CPT | Mod: GP | Performed by: PHYSICAL THERAPIST

## 2018-02-19 PROCEDURE — 80048 BASIC METABOLIC PNL TOTAL CA: CPT | Performed by: HOSPITALIST

## 2018-02-19 PROCEDURE — 99232 SBSQ HOSP IP/OBS MODERATE 35: CPT | Performed by: HOSPITALIST

## 2018-02-19 PROCEDURE — 25000125 ZZHC RX 250: Performed by: PHYSICIAN ASSISTANT

## 2018-02-19 PROCEDURE — A9270 NON-COVERED ITEM OR SERVICE: HCPCS | Mod: GY | Performed by: THORACIC SURGERY (CARDIOTHORACIC VASCULAR SURGERY)

## 2018-02-19 PROCEDURE — 85049 AUTOMATED PLATELET COUNT: CPT | Performed by: HOSPITALIST

## 2018-02-19 PROCEDURE — 25000128 H RX IP 250 OP 636: Performed by: THORACIC SURGERY (CARDIOTHORACIC VASCULAR SURGERY)

## 2018-02-19 RX ORDER — OXYCODONE HYDROCHLORIDE 5 MG/1
5-10 TABLET ORAL EVERY 4 HOURS PRN
Qty: 40 TABLET | Refills: 0 | Status: SHIPPED | OUTPATIENT
Start: 2018-02-19 | End: 2018-03-14

## 2018-02-19 RX ORDER — ASPIRIN 81 MG/1
81 TABLET, CHEWABLE ORAL DAILY
Qty: 30 TABLET | Refills: 3 | Status: SHIPPED | OUTPATIENT
Start: 2018-02-20

## 2018-02-19 RX ORDER — ATORVASTATIN CALCIUM 10 MG/1
10 TABLET, FILM COATED ORAL DAILY
Qty: 30 TABLET | Refills: 3 | Status: SHIPPED | OUTPATIENT
Start: 2018-02-19 | End: 2023-11-22

## 2018-02-19 RX ORDER — METOPROLOL TARTRATE 50 MG
50 TABLET ORAL 2 TIMES DAILY
Qty: 60 TABLET | Refills: 3 | Status: SHIPPED | OUTPATIENT
Start: 2018-02-19 | End: 2018-06-06

## 2018-02-19 RX ORDER — ACETAMINOPHEN 325 MG/1
650 TABLET ORAL EVERY 4 HOURS PRN
Qty: 100 TABLET | Refills: 1 | Status: SHIPPED | OUTPATIENT
Start: 2018-02-19 | End: 2018-03-14

## 2018-02-19 RX ORDER — AMOXICILLIN 250 MG
1 CAPSULE ORAL 2 TIMES DAILY PRN
Qty: 100 TABLET | Refills: 1 | Status: SHIPPED | OUTPATIENT
Start: 2018-02-19 | End: 2020-03-26

## 2018-02-19 RX ORDER — LOSARTAN POTASSIUM 25 MG/1
25 TABLET ORAL DAILY
Qty: 30 TABLET | Refills: 3 | Status: SHIPPED | OUTPATIENT
Start: 2018-02-20 | End: 2018-06-13

## 2018-02-19 RX ADMIN — HEPARIN SODIUM 5000 UNITS: 5000 INJECTION, SOLUTION INTRAVENOUS; SUBCUTANEOUS at 04:54

## 2018-02-19 RX ADMIN — Medication 25 MG: at 08:36

## 2018-02-19 RX ADMIN — ASPIRIN 81 MG 81 MG: 81 TABLET ORAL at 08:36

## 2018-02-19 RX ADMIN — METOPROLOL TARTRATE 50 MG: 50 TABLET ORAL at 08:36

## 2018-02-19 RX ADMIN — ACETAMINOPHEN 650 MG: 325 TABLET, FILM COATED ORAL at 04:54

## 2018-02-19 RX ADMIN — ATORVASTATIN CALCIUM 10 MG: 10 TABLET, FILM COATED ORAL at 08:36

## 2018-02-19 RX ADMIN — ACETAMINOPHEN 650 MG: 325 TABLET, FILM COATED ORAL at 00:19

## 2018-02-19 RX ADMIN — FAMOTIDINE 10 MG: 10 TABLET, FILM COATED ORAL at 08:36

## 2018-02-19 RX ADMIN — HEPARIN SODIUM 5000 UNITS: 5000 INJECTION, SOLUTION INTRAVENOUS; SUBCUTANEOUS at 13:35

## 2018-02-19 RX ADMIN — ONDANSETRON 4 MG: 4 TABLET, ORALLY DISINTEGRATING ORAL at 07:21

## 2018-02-19 NOTE — DISCHARGE SUMMARY
"Discharge Summary    Vincenzo Moss MRN# [id number]   YOB: 1950 Age: 67 year old     Date of Admission:  2/13/2018  Date of Discharge:  2/19/2018  Admitting Physician:  Naomie Toledo MD  Discharge Physician:  Naomie Toledo,*  Discharging Service:  Cardiovascular and Thoracic Surgery     [place] clinic:   [phone] [facility] [address] [address] [facility] [address], [facility] [address]       Primary Provider: Michael Mcbride          Admission Diagnoses:   Cardiogenic shock (H) [R57.0]  Pericardial effusion [I31.3]  Thoracic ascending aortic aneurysm (H) [I71.2]          Discharge Diagnosis:   Patient Active Problem List   Diagnosis     BPH (benign prostatic hypertrophy) with urinary obstruction     Post-operative state     S/P aortic dissection repair     Fluid overload     Transient hyperglycemia post procedure     Anemia due to blood loss, acute                Discharge Disposition:   Discharged to home           Condition on Discharge:   Discharge condition: Stable   Discharge vitals: Blood pressure 117/63, pulse 75, temperature 100.5  F (38.1  C), temperature source Oral, resp. rate 16, height 1.778 m (5' 10\"), weight 105.6 kg (232 lb 12.9 oz), SpO2 93 %.     Code status on discharge: Full Code           Procedures:   On 2/13/18, Vincenzo Moss underwent successful Emergent type A aortic dissection repair with a 34 mm Hemashield interposition graft, intraoperative OLI by Dr. Naomie Toledo.           Medications Prior to Admission:     Prescriptions Prior to Admission   Medication Sig Dispense Refill Last Dose     Calcium Citrate-Vitamin D (CALCIUM + D PO) Take 600 mg by mouth daily   2/12/2018 at Unknown time     multivitamin, therapeutic with minerals (MULTI-VITAMIN) TABS tablet Take 1 tablet by mouth daily   2/12/2018 at Unknown time     [DISCONTINUED] Losartan Potassium (COZAAR PO) Take 50 mg by mouth daily   2/12/2018 at Unknown time             Discharge Medications:     Current " Discharge Medication List      START taking these medications    Details   oxyCODONE IR (ROXICODONE) 5 MG tablet Take 1-2 tablets (5-10 mg) by mouth every 4 hours as needed for other (pain control or improvement in physical function. Hold dose for analgesic side effects.)  Qty: 40 tablet, Refills: 0    Associated Diagnoses: S/P aortic dissection repair      acetaminophen (TYLENOL) 325 MG tablet Take 2 tablets (650 mg) by mouth every 4 hours as needed for other  Qty: 100 tablet, Refills: 1    Associated Diagnoses: S/P aortic dissection repair      aspirin 81 MG chewable tablet Take 1 tablet (81 mg) by mouth daily  Qty: 30 tablet, Refills: 3    Associated Diagnoses: S/P aortic dissection repair      metoprolol tartrate (LOPRESSOR) 50 MG tablet Take 1 tablet (50 mg) by mouth 2 times daily  Qty: 60 tablet, Refills: 3    Associated Diagnoses: S/P aortic dissection repair      senna-docusate (SENOKOT-S;PERICOLACE) 8.6-50 MG per tablet Take 1 tablet by mouth 2 times daily as needed for constipation  Qty: 100 tablet, Refills: 1    Associated Diagnoses: S/P aortic dissection repair         CONTINUE these medications which have CHANGED    Details   atorvastatin (LIPITOR) 10 MG tablet Take 1 tablet (10 mg) by mouth daily  Qty: 30 tablet, Refills: 3    Associated Diagnoses: S/P aortic dissection repair      losartan (COZAAR) 25 MG tablet Take 1 tablet (25 mg) by mouth daily  Qty: 30 tablet, Refills: 3    Associated Diagnoses: S/P aortic dissection repair         CONTINUE these medications which have NOT CHANGED    Details   Calcium Citrate-Vitamin D (CALCIUM + D PO) Take 600 mg by mouth daily      multivitamin, therapeutic with minerals (MULTI-VITAMIN) TABS tablet Take 1 tablet by mouth daily                   Consultations:   Nutrition, intensivist, Hospitalist             Brief History of Illness:   Mr. Moss is a very pleasant 67-year-old gentleman with no prior cardiac history who presented to the emergency room with  complaints of chest pain/back pain that woke him up in the middle of the night at around 3:00 a.m.  CT angiogram of the chest was performed in the ER that demonstrated a 6.5 cm ascending aortic aneurysm with a moderate pericardial effusion but no definite aortic dissection flap was identified.  We were consulted in the ED and evaluated the patient emergently and viewed the CT angiogram.  Our concerns were high enough for an acute type A aortic dissection given the hemopericardium.           Hospital Course:   On 2/13/18, Vincenzo Moss underwent successful Emergent type A aortic dissection repair with a 34 mm Hemashield interposition graft, intraoperative OLI by Dr. Naomie Toledo.     He was extubated within 24 hours from surgery per protocol. Once weaned from hemodynamic stabilizing infusions he was transferred to the surgical telemetry floor. Blood pressure medications were titrated for tight BP control and will continue to monitor as an outpatient.     He was fluid overloaded and was treated with IV diuretics. At discharge he remains slightly fluid up but is not edematous and is breathing well on room air. Will have patient trend weight and will follow up fluid status as an outpatient.    He was transiently hyperglycemic and treated with insulin infusion and transitioned to SSI per protocol. He does not have diabetes and blood sugars remained stable. No further glycemic control agents needed at discharge.    He has been working well with cardiac rehab and is stable for discharge on POD #6 to home with the help of his family. Follow up appointments with cardiac surgery, cardiac rehab and cardiology have been arranged. Patient is encouraged to follow up with PCP in 1-2 weeks.             Significant Results:   Lab Results   Component Value Date    WBC 6.9 02/18/2018     Lab Results   Component Value Date    RBC 3.28 02/18/2018     Lab Results   Component Value Date    HGB 10.0 02/18/2018     Lab Results    Component Value Date    HCT 29.9 02/18/2018     No components found for: MCT  Lab Results   Component Value Date    MCV 91 02/18/2018     Lab Results   Component Value Date    MCH 30.5 02/18/2018     Lab Results   Component Value Date    MCHC 33.4 02/18/2018     Lab Results   Component Value Date    RDW 13.7 02/18/2018     Lab Results   Component Value Date     02/19/2018       Last Basic Metabolic Panel:  Lab Results   Component Value Date     02/19/2018      Lab Results   Component Value Date    POTASSIUM 3.5 02/19/2018     Lab Results   Component Value Date    CHLORIDE 105 02/19/2018     Lab Results   Component Value Date    TAMARA 8.5 02/19/2018     Lab Results   Component Value Date    CO2 29 02/19/2018     Lab Results   Component Value Date    BUN 14 02/19/2018     Lab Results   Component Value Date    CR 0.88 02/19/2018     Lab Results   Component Value Date     02/19/2018                  Pending Results:   None           Discharge Instructions and Follow-Up:   Discharge diet: Regular   Discharge activity: Daily weights: Call if weight gain 2-3 lbs over 24 hours or if greater than 5 lbs in 1 week.  No lifting more than 10 lbs for 8-12 weeks.  No driving for 1 month.  Call for pain medication refill.  Call for temperature greater than 101.0  Daily shower with antibacterial soap.   Discharge follow-up: *Follow up primary care provider in 7-10 days after discharge in order to review your medication, vital signs, obtain any necessary lab work your doctor may want, and to update them on your hospitalization and medical issues.   *Follow up with Nicole Melara PA-C for Dr Toledo, heart surgeon, on 3/8/18 at 3pm at [facility] at [facility] [address]. If any questions or concerns call [phone].  You will see us once at this visit and then if everything is going well you will not need to see us again.  You will follow long term with your cardiologist.   *Follow up with   Caroline, cardiologist, 3/14/18 at 1:15pm. This is who you will follow with long term about your heart issues. [phone].   *For [place] outpatient cardiac rehab, call [phone].    Outpatient therapy: Cardiac rehab   Home Care agency: None    Supplies and equipment: None   Lines and drains: None    Wound care: Wash incision daily with antibacterial soap   Other instructions: None

## 2018-02-19 NOTE — PROGRESS NOTES
CV Surgery    S: No acute events overnight. Saturating well on room air. Pain controlled. Tolerating diet. +BM    O: B/P: 111/60, T: 99.1, P: 75, R: 16  General: NAD  Heart: RRR normal s1 and s2  Lungs: diminished bases  Abd: soft NTND  Sternum: CDI  Extremities: minimal edema    Lab Results   Component Value Date    WBC 6.9 02/18/2018     Lab Results   Component Value Date    RBC 3.28 02/18/2018     Lab Results   Component Value Date    HGB 10.0 02/18/2018     Lab Results   Component Value Date    HCT 29.9 02/18/2018     No components found for: MCT  Lab Results   Component Value Date    MCV 91 02/18/2018     Lab Results   Component Value Date    MCH 30.5 02/18/2018     Lab Results   Component Value Date    MCHC 33.4 02/18/2018     Lab Results   Component Value Date    RDW 13.7 02/18/2018     Lab Results   Component Value Date     02/19/2018       Last Basic Metabolic Panel:  Lab Results   Component Value Date     02/19/2018      Lab Results   Component Value Date    POTASSIUM 3.5 02/19/2018     Lab Results   Component Value Date    CHLORIDE 105 02/19/2018     Lab Results   Component Value Date    TAMARA 8.5 02/19/2018     Lab Results   Component Value Date    CO2 29 02/19/2018     Lab Results   Component Value Date    BUN 14 02/19/2018     Lab Results   Component Value Date    CR 0.88 02/19/2018     Lab Results   Component Value Date     02/19/2018       A/P: POD#6 s/p Emergent type A aortic dissection repair with a 34 mm Hemashield interposition graft, intraoperative OLI by Dr. Naomie Toledo    Stable for discharge to home today.   Discussed importance of weighing self daily and daily blood pressure monitoring.     Nicole Melara PA-C  Pager [phone]

## 2018-02-19 NOTE — PLAN OF CARE
Problem: Patient Care Overview  Goal: Plan of Care/Patient Progress Review  Outcome: Improving  Pt had an uneventful night. AVSS, on room air, hydralazine given x 1 for elevated BP. Incisions cdi. Up with SBA. Lungs diminished, productive cough. IS 1000 Tylenol for pain control. Voiding, had BM.

## 2018-02-19 NOTE — PROGRESS NOTES
SPIRITUAL HEALTH SERVICES  Spiritual Assessment Progress Note  [facility]    checked in with pt due to LOS.  Pt and wife were preparing to DC later this after noon.  Pt stated that he is still in shock that this happened and grateful that he caught in time.      No other needs at this time.      listened and provided support.     No follow up.                                                                                                                                             Naheed Beckham M.Div., [facility]  Staff    Pager [phone]

## 2018-02-19 NOTE — PLAN OF CARE
"Problem: Patient Care Overview  Goal: Plan of Care/Patient Progress Review  Discharge Planner PT   Patient plan for discharge: Home with OP CR or OP PT   Current status: independent with sit<>stand from the chair. Supervision with walking, cues for gaze and to allow shoulders to \"drop/relax.\" Ambulated 150' x 2 with seated rest in between. Edu on signs and symptoms of exercise intolerance, importance of frequent short bouts of activity and sternal precautions. /60 HR 80 pre act, post act 127/70 HR 80-both taken in sitting. O2 94%.  Barriers to return to prior living situation: None.  Recommendations for discharge: Home. OP CR phase II.  with supervision as needed on stairs, pt does endorse some light headedness at time with transitions.   Rationale for recommendations: Pt will benefit from OP CR phase II or OP PT if without qualifying diagnosis for OP CR.        Entered by: Arabella Good 02/19/2018 9:37 AM           "

## 2018-02-19 NOTE — PROGRESS NOTES
[facility]    Hospitalist Progress Note    Date of Service (when I saw the patient): 02/19/2018    Assessment & Plan   Vincenzo Moss is a 67-year-old pleasant male with medical history significant for hypertension, hyperlipidemia, BPH, GERD, who presented to the Emergency Room with chest pain, throat tightness and headache who was found to have an ascending aortic aneurysm, size 6.5 cm.     Type A aortic dissection  HTN  Patient was urgently taken to OR urgently after presentation on 02/13/2018 to ER and was repaired with Hemashield interposition graft. The patient received blood product as above during Operating Room, remained intubated for 24 hours postoperatively, was successfully extubated 2/14/18.  - Post op management including fluid/diet, BP, pain control, catheter, prophylactic antibiotic and chest tubes per primary.   - Patient using IS  - Lasix per primary    Anemia, acute post op, likely blood loss, and thrombocytopenia  Patient presented with hemoglobin of 14.6 and normal platelet count of 151,000. Dropped his hemoglobin and platelet significantly but suspect this is due to consumption and blood loss from surgery.  The patient has received blood products during surgery including 2 units of FFP, 2 units of platelet, 1 pack of cryoprecipitate and 1 pack of PRBC intraoperatively. Also Factor VII were also given for hemostasis.   - Hemoglobin has been fairly stable at range of 10.     - Platelet count has dropped to below 50%, could be due to consumption, unlikely HIT, it has gradually improved to normal 152, no further work up. No signs of bleeding at this point.      Cardiogenic shock likely due to dissecting aortic aneurysm  The patient was maintained on vasopressor but he is off all vasopressors.       Postoperative fever  The patient had temperature of 101.3 postoperative day #1. He has completed prophylactic antibiotics. Blood cultures drawn from 2 days ago remain negative.     -  Chest x-ray finding suggestive of atelectasis.   - No signs of infection clinically  - Fever subsided   - Encourage incentive spirometry.     BPH  The patient had TURP done in 12/2016.  He has not had an issue with urinary retention since then.   - Miller catheter removed, voiding adequately.      Hyperlipidemia and hypertension  The patient needs strict blood pressure control given his aneurysm and status post repair.  Blood pressure managed by CT Surgery.       Hyperglycemia likely due to stress.  His A1c was 5.5.  The patient was maintained on insulin drip per protocol for 48 hours postoperatively and transitioned to sliding scale insulin.   - BS in low 100s.    Gastroesophageal reflux disease.     Constipation: has PRN senokot.    DVT Prophylaxis: Defer to primary service    Code Status: Full Code    Disposition: Expected discharge: defer to primary. Discussed with patient and RN    Theresa Grant MD  Hospitalist    Interval History    - Received one dose of IV hydralazine overnight for . Rest all BPs are good in the last 24 hours.  - up in chair, working with IS.  - he was moving more in his bd overnight and feels post op pain is slightly worse today, still manageable with tylenol alone.  - denies dyspnea. Cough (+), occ clear sputum.  - Afebrile. Tmax,100.    -Data reviewed today: I reviewed all new labs results over the last 24 hours. I personally reviewed no images or EKG's today.    Physical Exam   Temp: 99.1  F (37.3  C) Temp src: Oral BP: 111/60 Pulse: 75 Heart Rate: 74 Resp: 16 SpO2: 93 % O2 Device: None (Room air) Oxygen Delivery: 2 LPM  Vitals:    02/17/18 0655 02/18/18 0635 02/19/18 0600   Weight: 108.5 kg (239 lb 3.2 oz) 106.5 kg (234 lb 12.6 oz) 105.6 kg (232 lb 12.9 oz)     Vital Signs with Ranges  Temp:  [98.6  F (37  C)-99.8  F (37.7  C)] 99.1  F (37.3  C)  Pulse:  [72-88] 75  Heart Rate:  [70-87] 74  Resp:  [16-18] 16  BP: ()/(47-80) 111/60  SpO2:  [93 %-98 %] 93 %  I/O last 3  completed shifts:  In: 1560 [P.O.:1560]  Out: 2825 [Urine:2825]    Constitutional: Alert, awake. Up in chair, comfortable.   HEENT: PERRLA EOMI  Chest/Respiratory: Bilateral equal air entry, fine basilar crepts noted, no wheezing. Normal work of breathing.   -sternotomy wound clean.    Cardiovascular: Regular s1s2, no murmur, rub or gallop. No tachycardia. no edema.  GI: Soft, non distended, non tender, bowel tones active.  Skin/Integumen: No rash, no blister.    Medications     Reason beta blocker order not selected       IV fluid REPLACEMENT ONLY         metoprolol tartrate  50 mg Oral BID     aspirin  81 mg Oral Daily     heparin  5,000 Units Subcutaneous Q8H     losartan  25 mg Oral Daily     famotidine  10 mg Oral BID     insulin aspart  1-7 Units Subcutaneous TID AC     insulin aspart  1-5 Units Subcutaneous At Bedtime     atorvastatin (LIPITOR) tablet 10 mg  10 mg Oral Daily     sodium chloride (PF)  3 mL Intracatheter Q8H       Data     Recent Labs  Lab 02/19/18  0800 02/18/18  0815 02/17/18  0855 02/16/18  0739 02/15/18  0420 02/14/18  0555 02/13/18  2200  02/13/18  2050  02/13/18  1226 02/13/18  1015  02/13/18  0957   WBC  --  6.9 7.2 9.3 9.9 8.2 8.6  --  17.8*  < >  --   --   --  11.9*   HGB  --  10.0* 10.1* 10.4* 9.0* 10.3* 10.1*  < > 9.0*  < >  --   --   < > 14.6   MCV  --  91 92 93 93 90 91  --  92  < >  --   --   --  92    132* 125* 77* 66* 80* 67*  --  118*  < >  --   --   --  151   INR  --   --   --   --   --  0.93 0.86  --  0.82*  < >  --   --   --  1.04    139 141 139 144 144 145*  < > 147*  < >  --   --   < > 141   POTASSIUM 3.5 3.5 3.5 3.6 4.2 4.0 4.0  < > 3.3*  < >  --   --   < > 4.0   CHLORIDE 105 107 108 108 112* 113* 112*  --  112*  < >  --   --   --  106   CO2 29 24 24 25 26 25 21  --  20  < >  --   --   --  26   BUN 14 17 19 19 24 23 20  --  21  < >  --   --   --  20   CR 0.88 0.80 0.77 0.78 0.97 1.24 1.35*  --  1.40*  < >  --   --   --  1.47*   ANIONGAP 5 8 9 6 6 6 12  --   15*  < >  --   --   --  9   TAMARA 8.5 8.3* 8.3* 8.2* 7.4* 7.2* 7.2*  --  7.6*  < >  --   --   --  8.5   * 145* 162* 110* 117* 119* 185*  --  209*  < >  --   --   --  140*   ALBUMIN  --   --   --   --  2.8*  --   --   --   --   --   --   --   --  4.0   PROTTOTAL  --   --   --   --  5.5*  --   --   --   --   --   --   --   --  7.2   BILITOTAL  --   --   --   --  0.7  --   --   --   --   --   --   --   --  0.8   ALKPHOS  --   --   --   --  40  --   --   --   --   --   --   --   --  70   ALT  --   --   --   --  31  --   --   --   --   --   --   --   --  47   AST  --   --   --   --  40  --   --   --   --   --   --   --   --  35   TROPI  --   --   --   --   --   --   --   --   --   --  0.018  --   --  <0.015   TROPONIN  --   --   --   --   --   --   --   --   --   --   --  0.00  --   --    < > = values in this interval not displayed.    No results found for this or any previous visit (from the past 24 hour(s)).

## 2018-02-19 NOTE — PLAN OF CARE
Problem: Patient Care Overview  Goal: Plan of Care/Patient Progress Review  Outcome: Adequate for Discharge Date Met: 02/19/18  Patient and Wife given discharge instruction with questions answered. Medications filled at [facility] pharmacy and given to patient.

## 2018-02-20 NOTE — PLAN OF CARE
Problem: Patient Care Overview  Goal: Plan of Care/Patient Progress Review  Physical Therapy Discharge Summary    Reason for therapy discharge:    Discharged to home with outpatient therapy.    Progress towards therapy goal(s). See goals on Care Plan in [facility] electronic health record for goal details.  Goals met    Therapy recommendation(s):    Continued therapy is recommended.  Rationale/Recommendations:  Pt will benefit from continued OP services to progress and monitor functional activity and exercise progression for optimal safety . OP CR phase II or OP PT.

## 2018-02-21 ENCOUNTER — TELEPHONE (OUTPATIENT)
Dept: OTHER | Facility: CLINIC | Age: 68
End: 2018-02-21

## 2018-02-21 NOTE — TELEPHONE ENCOUNTER
Patient called stating his BP with activity has been as high as 160/81. Patient post op ascending Aortic Aneurysm Repair. Am /81 HR 85, 2 hours post Metoprolol 50 mg and Cozaar 25 mg 138/73 HR 72. Requested patient take it again in am and call with results and will discuss with provider. Needs to schedule his pout patient cardiac rehab also.

## 2018-02-22 ENCOUNTER — TELEPHONE (OUTPATIENT)
Dept: OTHER | Facility: CLINIC | Age: 68
End: 2018-02-22

## 2018-02-22 NOTE — TELEPHONE ENCOUNTER
48 hour post op call    ACTIVITY  How are you doing with activity?  I am doing well. I can navigate my house and steps with pout any problems.   Are you continuing to use your IS 3-4 times a day. Are you improving on your numbers?  Yes, the morning it is shallow but improves as the day goes on. Encouraged him to continue with his IS QID.   Are you weighing yourself daily and has it been stable?.  I am dropping about 2 lbs a day. I am 4 lbs up from my normal weight.     PAIN  How is your pain on a 0-10 scale, what are you doing for your pain.  Getting better every day. I have slept better each night I am home.     Are you still doing sternal precautions?  Do you hear any clicking when you are moving or taking a deep breath? Yes, no clicking or popping.      INCISION:   It looks good. No drainage.     ASSISTANCE  Do you have someone at home to help you with your daily activities and transportation needs?  Yes my wife      MEDICATIONS  I would like to review your discharge medications and answer any questions you may have.  Reviewed. /77 HR 84 upon arising this am. 2 hours after my medications 113/63 HR 76. BP at bedtime last evening after medications and minimal activity it was 162/90 HR 77. I will discuss with Nicole Melara PA-C and let patient know if any changes are needed.      Are you on a blood thinner/Coumadin  no    Who is managing your Coumadin dosing/INR.       FOLLOW UP     Are you scheduled for cardiac rehab? Does not qualify. Will order PT for conditioning.   You are scheduled to see our PA or Surgeon?  3/8  You are scheduled to see your cardiologist ?  Dr Velazquez 3/14 new patient consult.   You are scheduled to see your primary care physician? Not yet.   Do you have our contact information?  Yes    [employer]    Green    Were you happy with your care? Could we have done anything better? No concerns, we had great service.

## 2018-02-26 ENCOUNTER — HOSPITAL ENCOUNTER (OUTPATIENT)
Dept: PHYSICAL THERAPY | Facility: CLINIC | Age: 68
Setting detail: THERAPIES SERIES
End: 2018-02-26
Attending: HOSPITALIST
Payer: MEDICARE

## 2018-02-26 PROCEDURE — 40000185 ZZHC STATISTIC PT OUTPT VISIT: Performed by: PHYSICAL THERAPIST

## 2018-02-26 PROCEDURE — G8979 MOBILITY GOAL STATUS: HCPCS | Mod: GP,CI | Performed by: PHYSICAL THERAPIST

## 2018-02-26 PROCEDURE — 97162 PT EVAL MOD COMPLEX 30 MIN: CPT | Mod: GP | Performed by: PHYSICAL THERAPIST

## 2018-02-26 PROCEDURE — G8978 MOBILITY CURRENT STATUS: HCPCS | Mod: GP,CJ | Performed by: PHYSICAL THERAPIST

## 2018-02-26 PROCEDURE — 97110 THERAPEUTIC EXERCISES: CPT | Mod: GP | Performed by: PHYSICAL THERAPIST

## 2018-02-26 ASSESSMENT — 6 MINUTE WALK TEST (6MWT): TOTAL DISTANCE WALKED (FT): 1175

## 2018-02-26 NOTE — PROGRESS NOTES
[facility]        OUTPATIENT PHYSICAL THERAPY FUNCTIONAL EVALUATION  PLAN OF TREATMENT FOR OUTPATIENT REHABILITATION  (COMPLETE FOR INITIAL CLAIMS ONLY)  Patient's Last Name, First Name, M.I.  YOB: 1950  Vincenzo Moss     Provider's Name   [facility]   Medical Record No.  [id number]     Start of Care Date:  02/26/18   Onset Date:  02/13/18   Type:     _X__PT   ____OT  ____SLP Medical Diagnosis:   S/p Aortic Aorta Dissection     PT Diagnosis:  Decreased activity tolerance Visits from SOC:  1                              __________________________________________________________________________________  Plan of Treatment/Functional Goals:  strengthening           GOALS  6MWT  Client will be able to ambulate at least 1500 ft on 6MWT to demonstrate improved activity tolerance  03/25/18    HEP  Client will be independent in HEP and knowledgeable in what he can continue to work on at the [facility] in order to continue to improver overall strength and activity tolerance  03/25/18                       Therapy Frequency:      Predicted Duration of Therapy Intervention:  2x/week for 2 weeks and then 1x/week for 2 weeks    Simona Booth, PT                                    I CERTIFY THE NEED FOR THESE SERVICES FURNISHED UNDER        THIS PLAN OF TREATMENT AND WHILE UNDER MY CARE     (Physician co-signature of this document indicates review and certification of the therapy plan).                Certification Date From:    2/26/18  Certification Date To:   3/25/18    Referring Provider:  Dr Theresa Grant    Initial Assessment  See Epic Evaluation- Start of Care Date: 02/26/18

## 2018-02-26 NOTE — PROGRESS NOTES
02/26/18 1400   Signing Clinician's Name / Credentials   Signing clinician's name / credentials Simona Lama [facility]   Functional Gait Assessment (SYDNEE Hardy., Andree GIsmael F., et al. (2004))   1. GAIT LEVEL SURFACE 3   2. CHANGE IN GAIT SPEED 3   3. GAIT WITH HORIZONTAL HEAD TURNS 3   4. GAIT WITH VERTICAL HEAD TURNS 3   5. GAIT AND PIVOT TURN 3   6. STEP OVER OBSTACLE 3   7. GAIT WITH NARROW BASE OF SUPPORT 3   8. GAIT WITH EYES CLOSED 2   9. AMBULATING BACKWARDS 3   10. STEPS 2   Total Functional Gait Assessment Score   TOTAL SCORE: (MAXIMUM SCORE 30) 28

## 2018-02-26 NOTE — PROGRESS NOTES
02/26/18 1400   Quick Adds   Type of Visit Initial OP PT Evaluation   General Information   Start of Care Date 02/26/18   Referring Physician Dr Theresa Grant   Orders Evaluate and Treat as Indicated   Order Date 02/19/18   Medical Diagnosis s/p Aortic dissection repair   Onset of illness/injury or Date of Surgery 02/13/18   Precautions/Limitations sternal   Surgical/Medical history reviewed Yes   Pertinent history of current problem (include personal factors and/or comorbidities that impact the POC) On 2/13/18 woke up with chest and jaw pain, admitted to hospital and underwent emergent aortic dissection repair with 34mm graft.  In hospital from 2/13/18 to 2/19/18.  Still getting fluid off the body and up 4x/night.  BP still elevated.    Pertinent Visual History  no change in vision   Prior level of function comment Inttermittantly has gone to the [facility]. Independent with all functional mobility.     Patient role/Employment history Retired   Living environment House/townhome   Home/Community Accessibility Comments Lives with wife.     General Information Comments Was working on finishing basement with son.    Fall Risk Screen   Fall screen completed by PT   Have you fallen 2 or more times in the past year? No   Have you fallen and had an injury in the past year? No   Timed Up and Go score (seconds) 10.06   Is patient a fall risk? No   Pain   Pain comments Some mild incisional pain.     Cognitive Status Examination   Orientation orientation to person, place and time   Level of Consciousness alert   Observation   Observation Walk stiffly   Strength   Strength Comments B LEs tested in sitting 5/5 throughout.    Bed Mobility   Bed Mobility Comments not tested   Transfer Skills   Transfer Comments independent and able to do without UEs.    Gait   Gait Comments Ambulates without an AD. No gross deficits with gait.    Gait Special Tests   Gait Special Tests FUNCTIONAL GAIT ASSESSMENT;SIX MINUTE WALK TEST   Gait Special  Tests Functional Gait Assessment Score out of 30   Score out of 30 28   Gait Special Tests Six Minute Walk Test   Feet 1175 Feet   Comments BP after 6MWT is 165/78.    Balance   Balance Comments SLS x 30 sec on either foot. With EC only 2 sec on either side.    Sensory Examination   Sensory Perception Comments Denies tingling or numbness.     Planned Therapy Interventions   Planned Therapy Interventions strengthening   Clinical Impression   Criteria for Skilled Therapeutic Interventions Met yes, treatment indicated   PT Diagnosis Decreased activity tolerance   Influenced by the following impairments decreased activity tolerance with elevated BP   Functional limitations due to impairments limited community mobility, needs guidance on exercising, unable to work on construction project on house.    Clinical Presentation Evolving/Changing   Clinical Presentation Rationale variable BP right now, 6MWT. TUG, FGA   Clinical Decision Making (Complexity) Moderate complexity   Predicted Duration of Therapy Intervention (days/wks) 2x/week for 2 weeks and then 1x/week for 2 weeks   Risk & Benefits of therapy have been explained Yes   Patient, Family & other staff in agreement with plan of care Yes   GOALS   PT Eval Goals 1;2   Goal 1   Goal Identifier 6MWT   Goal Description Client will be able to ambulate at least 1500 ft on 6MWT to demonstrate improved activity tolerance   Target Date 03/25/18   Goal 2   Goal Identifier HEP   Goal Description Client will be independent in HEP and knowledgeable in what he can continue to work on at the [facility] in order to continue to improver overall strength and activity tolerance   Target Date 03/25/18   Total Evaluation Time   Total Evaluation Time (Minutes) 35

## 2018-02-28 ENCOUNTER — TELEPHONE (OUTPATIENT)
Dept: OTHER | Facility: CLINIC | Age: 68
End: 2018-02-28

## 2018-02-28 NOTE — TELEPHONE ENCOUNTER
Patient called stating he has been having frequent urination at night. Denies burning or fevers. States it is a normal amount of urine. He is loosing 2-3 lbs daily but feels his legs swell throughout the day then return to normal in am. /79 HR 85 in am . 126/67 HR 75 2 hours after medication administration. Above discussed with Nicole Melara PA-C. No changes to made at current time. Patient seeing his primary care in am. Recommended he discuss his frequent urination with him. Follow up with CV Surgery team 3/8. Instructed him to continue taking his BP and HR and weight and bring numbers to his follow up appt. [place] states understanding.

## 2018-03-01 ENCOUNTER — HOSPITAL ENCOUNTER (OUTPATIENT)
Dept: PHYSICAL THERAPY | Facility: CLINIC | Age: 68
Setting detail: THERAPIES SERIES
End: 2018-03-01
Attending: HOSPITALIST
Payer: MEDICARE

## 2018-03-01 PROCEDURE — 97110 THERAPEUTIC EXERCISES: CPT | Mod: GP | Performed by: PHYSICAL THERAPIST

## 2018-03-01 PROCEDURE — 40000185 ZZHC STATISTIC PT OUTPT VISIT: Performed by: PHYSICAL THERAPIST

## 2018-03-01 NOTE — PROGRESS NOTES
BP update with PT:  All measurements taken with electronic BP cuff:  Resting /59 HR 68 bpm  Walked on TM at 2.0 mph.  At 5 min attempted to get BP measurements but had difficulty. Finally got measurement at 9 min and /116 bpm HR 90 bpm. Reported a little lightheaded when we stopped.  Rested for a few minutes.  Tolerated UE light calisthentic exercises in sitting. Walked again on TM for 5 min at 2.0 mph and BP after that was 144/67 HR 82 bpm. Ended with Sci Fit LEs only for 10 min.

## 2018-03-05 ENCOUNTER — HOSPITAL ENCOUNTER (OUTPATIENT)
Dept: PHYSICAL THERAPY | Facility: CLINIC | Age: 68
Setting detail: THERAPIES SERIES
End: 2018-03-05
Attending: HOSPITALIST
Payer: MEDICARE

## 2018-03-05 PROCEDURE — 97110 THERAPEUTIC EXERCISES: CPT | Mod: GP | Performed by: PHYSICAL THERAPIST

## 2018-03-05 PROCEDURE — 40000719 ZZHC STATISTIC PT DEPARTMENT NEURO VISIT: Performed by: PHYSICAL THERAPIST

## 2018-03-08 ENCOUNTER — HOSPITAL ENCOUNTER (OUTPATIENT)
Dept: PHYSICAL THERAPY | Facility: CLINIC | Age: 68
Setting detail: THERAPIES SERIES
End: 2018-03-08
Attending: HOSPITALIST
Payer: MEDICARE

## 2018-03-08 ENCOUNTER — OFFICE VISIT (OUTPATIENT)
Dept: CARDIOLOGY | Facility: CLINIC | Age: 68
End: 2018-03-08
Payer: MEDICARE

## 2018-03-08 VITALS
WEIGHT: 219.3 LBS | HEART RATE: 79 BPM | BODY MASS INDEX: 31.39 KG/M2 | HEIGHT: 70 IN | DIASTOLIC BLOOD PRESSURE: 73 MMHG | SYSTOLIC BLOOD PRESSURE: 126 MMHG

## 2018-03-08 DIAGNOSIS — Z98.890 S/P AORTIC DISSECTION REPAIR: Primary | ICD-10-CM

## 2018-03-08 PROCEDURE — 40000189 ZZH STATISTIC PT POOL VISIT: Performed by: PHYSICAL THERAPIST

## 2018-03-08 PROCEDURE — 97110 THERAPEUTIC EXERCISES: CPT | Mod: GP | Performed by: PHYSICAL THERAPIST

## 2018-03-08 PROCEDURE — 40000185 ZZHC STATISTIC PT OUTPT VISIT: Performed by: PHYSICAL THERAPIST

## 2018-03-08 NOTE — PROGRESS NOTES
CV Surgery Post Op Follow Up Note:     S: Per Discharge Summary:  On 2/13/18, Vincenzo Moss underwent successful Emergent type A aortic dissection repair with a 34 mm Hemashield interposition graft, intraoperative OLI by Dr. Naomie Toledo.      He was extubated within 24 hours from surgery per protocol. Once weaned from hemodynamic stabilizing infusions he was transferred to the surgical telemetry floor. Blood pressure medications were titrated for tight BP control and will continue to monitor as an outpatient.      He was fluid overloaded and was treated with IV diuretics. At discharge he remains slightly fluid up but is not edematous and is breathing well on room air. Will have patient trend weight and will follow up fluid status as an outpatient.     He was transiently hyperglycemic and treated with insulin infusion and transitioned to SSI per protocol. He does not have diabetes and blood sugars remained stable. No further glycemic control agents needed at discharge.     He has been working well with cardiac rehab and is stable for discharge on POD #6 to home with the help of his family. Follow up appointments with cardiac surgery, cardiac rehab and cardiology have been arranged. Patient is encouraged to follow up with PCP in 1-2 weeks.    Since Discharge he has been recovering well at home. His insurance would not cover cardiac rehab so he is undergoing physical therapy instead. He has been doing well with this. He has been weighing himself daily and his weight is decreasing appropriately and he reports to getting enough nutrition. He has been using his IS and his numbers are improving. He has been checking his blood pressure as instructed and they have been stable since increasing his cozaar. His pain is controlled and he is no longer taking narcotics or tylenol. We reviewed his medications and he has no questions.     Allergies: No Known Allergies    Medications:   Current Outpatient Prescriptions:       "aspirin 81 MG chewable tablet, Take 1 tablet (81 mg) by mouth daily, Disp: 30 tablet, Rfl: 3     atorvastatin (LIPITOR) 10 MG tablet, Take 1 tablet (10 mg) by mouth daily, Disp: 30 tablet, Rfl: 3     losartan (COZAAR) 25 MG tablet, Take 1 tablet (25 mg) by mouth daily (Patient taking differently: Take 50 mg by mouth daily ), Disp: 30 tablet, Rfl: 3     metoprolol tartrate (LOPRESSOR) 50 MG tablet, Take 1 tablet (50 mg) by mouth 2 times daily, Disp: 60 tablet, Rfl: 3     senna-docusate (SENOKOT-S;PERICOLACE) 8.6-50 MG per tablet, Take 1 tablet by mouth 2 times daily as needed for constipation, Disp: 100 tablet, Rfl: 1     Calcium Citrate-Vitamin D (CALCIUM + D PO), Take 600 mg by mouth daily, Disp: , Rfl:      multivitamin, therapeutic with minerals (MULTI-VITAMIN) TABS tablet, Take 1 tablet by mouth daily, Disp: , Rfl:      oxyCODONE IR (ROXICODONE) 5 MG tablet, Take 1-2 tablets (5-10 mg) by mouth every 4 hours as needed for other (pain control or improvement in physical function. Hold dose for analgesic side effects.) (Patient not taking: Reported on 3/8/2018), Disp: 40 tablet, Rfl: 0     acetaminophen (TYLENOL) 325 MG tablet, Take 2 tablets (650 mg) by mouth every 4 hours as needed for other (Patient not taking: Reported on 3/8/2018), Disp: 100 tablet, Rfl: 1    Physical Exam: Exam:  /73  Pulse 79  Ht 1.778 m (5' 10\")  Wt 99.5 kg (219 lb 4.8 oz)  BMI 31.47 kg/m2  Constitutional: healthy, alert and no distress  Head: Normocephalic. No masses, lesions, tenderness or abnormalities  Cardiovascular:RRR. No murmurs, clicks gallops or rub  Respiratory: Good diaphragmatic excursion. Lungs clear  Gastrointestinal: Abdomen soft, non-tender. BS normal. No masses, organomegaly  : Deferred  Musculoskeletal: extremities normal- no gross deformities noted, gait normal and normal muscle tone  Skin: no suspicious lesions or rashes  Incisions: CDI  Neurologic: Gait normal. Reflexes normal and symmetric. Sensation " grossly WNL.  Psychiatric: mentation appears normal and affect normal/bright      Assessment/Plan:     Continue current medication regimen.  Follow up with PCP  Continue physical therapies.   Discussed normal expectations with surgical recovery  Discussed timeline for restrictions  Follow up with cardiology as scheduled    All of the patient's questions were answered. Our contact information was given to him/her if they should have any further questions/concerns. No further follow-up is needed with us.      Nicole Melara PA-C  CV Surgery  [facility]  Pager: [phone]

## 2018-03-08 NOTE — MR AVS SNAPSHOT
After Visit Summary   3/8/2018    Vincenzo Moss    MRN: [id number]           Patient Information     Date Of Birth          1950        Visit Information        Provider Department      3/8/2018 3:00 PM [facility] CARDIOTHORACIC SURGERY, [place] [facility] Cardiothoracic        Today's Diagnoses     S/P aortic dissection repair    -  1       Follow-ups after your visit        Your next 10 appointments already scheduled     Mar 13, 2018 11:45 AM CDT   Neuro Treatment with Simona Lama, PT   [facility] ([facility])    [address]  [address]  [place] [address]   [phone]            Mar 14, 2018  1:15 PM CDT   New Visit with Can Velazquez MD   [facility] ([facility])    [address] [place], [place] [id number]  [place] [address]   [phone]            Mar 15, 2018 11:15 AM CDT   Neuro Treatment with Simona Lama, PT   [facility]    [address]  [address]  [place] [address]   [phone]            Mar 20, 2018  1:30 PM CDT   Neuro Treatment with Simona Lama, PT   [facility] ([facility])    [address]  [address]  [place] [address]   [phone]            Mar 22, 2018 11:00 AM CDT   Neuro Treatment with Simona Lama, PT   [facility] ([facility])    [address]  [address]  [place] [address]   [phone]            Mar 26, 2018 11:45 AM CDT   Neuro Treatment with Simona Lama, PT   [facility] ([facility])    [address]  [address]  [place] [address]   [phone]              Who to contact     Please call your clinic at [phone] to:    Ask questions about your health    Make or cancel appointments    Discuss your medicines    Learn about your test results    Speak to your doctor            Additional Information About  "Your Visit        Insploriont Information     [employer] is an electronic gateway that provides easy, online access to your medical records. With [employer], you can request a clinic appointment, read your test results, renew a prescription or communicate with your care team.     To sign up for [employer] visit the website at [email or web address]   You will be asked to enter the access code listed below, as well as some personal information. Please follow the directions to create your username and password.     Your access code is: [email or web address]  Expires: 2018 11:00 AM     Your access code will  in 90 days. If you need help or a new code, please contact your [facility] or call [phone] for assistance.        Care EveryWhere ID     This is your Care EveryWhere ID. This could be used by other organizations to access your [facility] medical records  [id number]        Your Vitals Were     Pulse Height BMI (Body Mass Index)             79 1.778 m (5' 10\") 31.47 kg/m2          Blood Pressure from Last 3 Encounters:   18 126/73   18 117/63   16 142/80    Weight from Last 3 Encounters:   18 99.5 kg (219 lb 4.8 oz)   18 105.6 kg (232 lb 12.9 oz)   16 99.3 kg (219 lb)              Today, you had the following     No orders found for display         Today's Medication Changes          These changes are accurate as of 3/8/18 11:59 PM.  If you have any questions, ask your nurse or doctor.               These medicines have changed or have updated prescriptions.        Dose/Directions    losartan 25 MG tablet   Commonly known as:  COZAAR   This may have changed:  when to take this   Used for:  S/P aortic dissection repair        Dose:  25 mg   Take 1 tablet (25 mg) by mouth daily   Quantity:  30 tablet   Refills:  3                Primary Care Provider Office Phone # Fax #    Michael Mcbride [phone]       [facility] [address]  [facility] [address]        Equal Access to Services     KATI CONWAY : Hadii aad ku hadstaro Soomaali, waaxda luqadaha, qaybta kaalmada kike, waxbrinda swatiin hayaajack kaurcosta jefferson jace . So [facility] [phone].    ATENCIÓN: Si habla español, tiene a antonio disposición servicios gratuitos de asistencia lingüística. Llame al [phone].    We comply with applicable federal civil rights laws and [place] laws. We do not discriminate on the basis of race, color, national origin, age, disability, sex, sexual orientation, or gender identity.            Thank you!     Thank you for choosing [facility] CARDIOTHORACIC  for your care. Our goal is always to provide you with excellent care. Hearing back from our patients is one way we can continue to improve our services. Please take a few minutes to complete the written survey that you may receive in the mail after your visit with us. Thank you!             Your Updated Medication List - Protect others around you: Learn how to safely use, store and throw away your medicines at www.disposemymeds.org.          This list is accurate as of 3/8/18 11:59 PM.  Always use your most recent med list.                   Brand Name Dispense Instructions for use Diagnosis    acetaminophen 325 MG tablet    TYLENOL    100 tablet    Take 2 tablets (650 mg) by mouth every 4 hours as needed for other    S/P aortic dissection repair       aspirin 81 MG chewable tablet     30 tablet    Take 1 tablet (81 mg) by mouth daily    S/P aortic dissection repair       atorvastatin 10 MG tablet    LIPITOR    30 tablet    Take 1 tablet (10 mg) by mouth daily    S/P aortic dissection repair       CALCIUM + D PO      Take 600 mg by mouth daily        losartan 25 MG tablet    COZAAR    30 tablet    Take 1 tablet (25 mg) by mouth daily    S/P aortic dissection repair       metoprolol tartrate 50 MG tablet    LOPRESSOR    60 tablet    Take 1 tablet (50 mg) by mouth 2 times daily    S/P aortic dissection  repair       Multi-vitamin Tabs tablet      Take 1 tablet by mouth daily        oxyCODONE IR 5 MG tablet    ROXICODONE    40 tablet    Take 1-2 tablets (5-10 mg) by mouth every 4 hours as needed for other (pain control or improvement in physical function. Hold dose for analgesic side effects.)    S/P aortic dissection repair       senna-docusate 8.6-50 MG per tablet    SENOKOT-S;PERICOLACE    100 tablet    Take 1 tablet by mouth 2 times daily as needed for constipation    S/P aortic dissection repair

## 2018-03-13 ENCOUNTER — HOSPITAL ENCOUNTER (OUTPATIENT)
Dept: PHYSICAL THERAPY | Facility: CLINIC | Age: 68
Setting detail: THERAPIES SERIES
End: 2018-03-13
Attending: HOSPITALIST
Payer: MEDICARE

## 2018-03-13 PROCEDURE — 97110 THERAPEUTIC EXERCISES: CPT | Mod: GP | Performed by: PHYSICAL THERAPIST

## 2018-03-13 PROCEDURE — 40000185 ZZHC STATISTIC PT OUTPT VISIT: Performed by: PHYSICAL THERAPIST

## 2018-03-14 ENCOUNTER — OFFICE VISIT (OUTPATIENT)
Dept: CARDIOLOGY | Facility: CLINIC | Age: 68
End: 2018-03-14
Payer: MEDICARE

## 2018-03-14 VITALS
HEART RATE: 59 BPM | SYSTOLIC BLOOD PRESSURE: 92 MMHG | BODY MASS INDEX: 31.5 KG/M2 | HEIGHT: 70 IN | DIASTOLIC BLOOD PRESSURE: 54 MMHG | WEIGHT: 220 LBS

## 2018-03-14 DIAGNOSIS — I10 BENIGN ESSENTIAL HYPERTENSION: ICD-10-CM

## 2018-03-14 DIAGNOSIS — Z98.890 S/P AORTIC DISSECTION REPAIR: Primary | ICD-10-CM

## 2018-03-14 PROCEDURE — 99204 OFFICE O/P NEW MOD 45 MIN: CPT | Performed by: INTERNAL MEDICINE

## 2018-03-14 RX ORDER — FUROSEMIDE 20 MG
10 TABLET ORAL DAILY
Qty: 45 TABLET | Refills: 3 | Status: SHIPPED | OUTPATIENT
Start: 2018-03-14 | End: 2018-06-13

## 2018-03-14 NOTE — MR AVS SNAPSHOT
After Visit Summary   3/14/2018    Vincenzo Moss    MRN: [id number]           Patient Information     Date Of Birth          1950        Visit Information        Provider Department      3/14/2018 1:15 PM Can Velazquez MD [facility]   [place]        Today's Diagnoses     S/P aortic dissection repair    -  1    Benign essential hypertension          Care Instructions    Let's try FUROSEMIDE 10mg (1/2 pill) once per day in the morning. Hold the medication if the morning blood pressure is under 110. You may also skip it if you have traveling or things where urination would be inconvenient.    We might use a full pill if the blood pressure is high for some reason (like over 150-160).    You may want to have your children check with ECHOCARDIOGRAMS to look at aortic valves and ascending aortas.    See my nurse practitioner, Tamara in 3 months with labs.              Follow-ups after your visit        Additional Services     Follow-Up with Cardiac Advanced Practice Provider                 Your next 10 appointments already scheduled     Mar 15, 2018 11:15 AM CDT   Neuro Treatment with Simona Lama, PT   [email or web address] ([facility])    [address] [place] [address] [address]  [place] [address]   [phone]            Mar 20, 2018  1:30 PM CDT   Neuro Treatment with Simona Lama, PT   [facility]    [address] [place] [address] [address]  [place] [address]   [phone]            Mar 22, 2018 11:00 AM CDT   Neuro Treatment with Simona Lama, PT   [email or web address] ([facility])    [address] [place] [address] [address]  [place] [address]   [phone]            Mar 26, 2018 11:45 AM CDT   Neuro Treatment with Simona Lama, PT   [email or web address] ([facility])    [address] [place] [address] [address]  [place] [address]   [phone]              Future  "tests that were ordered for you today     Open Future Orders        Priority Expected Expires Ordered    Follow-Up with Cardiac Advanced Practice Provider Routine 2018 3/14/2019 3/14/2018    Basic metabolic panel Routine 2018 3/14/2019 3/14/2018            Who to contact     If you have questions or need follow up information about today's clinic visit or your schedule please contact [facility] directly at [phone].  Normal or non-critical lab and imaging results will be communicated to you by MiserWarehart, letter or phone within 4 business days after the clinic has received the results. If you do not hear from us within 7 days, please contact the clinic through MiserWarehart or phone. If you have a critical or abnormal lab result, we will notify you by phone as soon as possible.  Submit refill requests through 99Bill or call your pharmacy and they will forward the refill request to us. Please allow 3 business days for your refill to be completed.          Additional Information About Your Visit        99Bill Information     99Bill lets you send messages to your doctor, view your test results, renew your prescriptions, schedule appointments and more. To sign up, go to [email or web address] . Click on \"Log in\" on the left side of the screen, which will take you to the Welcome page. Then click on \"Sign up Now\" on the right side of the page.     You will be asked to enter the access code listed below, as well as some personal information. Please follow the directions to create your username and password.     Your access code is: [email or web address]  Expires: 2018 11:00 AM     Your access code will  in 90 days. If you need help or a new code, please call your [facility] clinic or [phone].        Care EveryWhere ID     This is your Care EveryWhere ID. This could be used by other organizations to access your [facility] medical records  [id number]        Your Vitals " "Were     Pulse Height BMI (Body Mass Index)             59 1.778 m (5' 10\") 31.57 kg/m2          Blood Pressure from Last 3 Encounters:   03/14/18 92/54   03/08/18 126/73   02/19/18 117/63    Weight from Last 3 Encounters:   03/14/18 99.8 kg (220 lb)   03/08/18 99.5 kg (219 lb 4.8 oz)   02/19/18 105.6 kg (232 lb 12.9 oz)                 Today's Medication Changes          These changes are accurate as of 3/14/18  2:06 PM.  If you have any questions, ask your nurse or doctor.               Start taking these medicines.        Dose/Directions    furosemide 20 MG tablet   Commonly known as:  LASIX   Used for:  S/P aortic dissection repair, Benign essential hypertension   Started by:  Can Velazquez MD        Dose:  10 mg   Take 0.5 tablets (10 mg) by mouth daily   Quantity:  45 tablet   Refills:  3         These medicines have changed or have updated prescriptions.        Dose/Directions    losartan 25 MG tablet   Commonly known as:  COZAAR   This may have changed:  when to take this   Used for:  S/P aortic dissection repair        Dose:  25 mg   Take 1 tablet (25 mg) by mouth daily   Quantity:  30 tablet   Refills:  3         Stop taking these medicines if you haven't already. Please contact your care team if you have questions.     CALCIUM + D PO   Stopped by:  Can Velazquez MD                Where to get your medicines      These medications were sent to [place] [address] [place] [id number] [facility]  [address] [facility] [address]     Phone:  [phone]     furosemide 20 MG tablet                Primary Care Provider Office Phone # Fax #    Michael Mcbride [phone]       [facility] [address] [facility] [address]  [facility] [address]        Equal Access to Services     KATI CONWAY AH: Kim Mcgarry, wataj basurto, qaybta kaalmamarquise stokes, joe torres. So [facility] [phone].    ATENCIÓN: Si nikolas kraft " disposición servicios gratuitos de asistencia lingüística. Cammy wagner [phone].    We comply with applicable federal civil rights laws and [place] laws. We do not discriminate on the basis of race, color, national origin, age, disability, sex, sexual orientation, or gender identity.            Thank you!     Thank you for choosing [facility]  for your care. Our goal is always to provide you with excellent care. Hearing back from our patients is one way we can continue to improve our services. Please take a few minutes to complete the written survey that you may receive in the mail after your visit with us. Thank you!             Your Updated Medication List - Protect others around you: Learn how to safely use, store and throw away your medicines at www.disposemymeds.org.          This list is accurate as of 3/14/18  2:06 PM.  Always use your most recent med list.                   Brand Name Dispense Instructions for use Diagnosis    aspirin 81 MG chewable tablet     30 tablet    Take 1 tablet (81 mg) by mouth daily    S/P aortic dissection repair       atorvastatin 10 MG tablet    LIPITOR    30 tablet    Take 1 tablet (10 mg) by mouth daily    S/P aortic dissection repair       CoQ-10 10 MG Caps      Take 100 mg by mouth daily        furosemide 20 MG tablet    LASIX    45 tablet    Take 0.5 tablets (10 mg) by mouth daily    S/P aortic dissection repair, Benign essential hypertension       losartan 25 MG tablet    COZAAR    30 tablet    Take 1 tablet (25 mg) by mouth daily    S/P aortic dissection repair       metoprolol tartrate 50 MG tablet    LOPRESSOR    60 tablet    Take 1 tablet (50 mg) by mouth 2 times daily    S/P aortic dissection repair       Multi-vitamin Tabs tablet      Take 1 tablet by mouth daily        senna-docusate 8.6-50 MG per tablet    SENOKOT-S;PERICOLACE    100 tablet    Take 1 tablet by mouth 2 times daily as needed for constipation    S/P aortic  dissection repair       VITAMIN D (CHOLECALCIFEROL) PO      Take 2,000 Units by mouth daily
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
## 2018-03-14 NOTE — PATIENT INSTRUCTIONS
Let's try FUROSEMIDE 10mg (1/2 pill) once per day in the morning. Hold the medication if the morning blood pressure is under 110. You may also skip it if you have traveling or things where urination would be inconvenient.    We might use a full pill if the blood pressure is high for some reason (like over 150-160).    You may want to have your children check with ECHOCARDIOGRAMS to look at aortic valves and ascending aortas.    See my nurse practitioner, Tamara in 3 months with labs.

## 2018-03-15 ENCOUNTER — HOSPITAL ENCOUNTER (OUTPATIENT)
Dept: PHYSICAL THERAPY | Facility: CLINIC | Age: 68
Setting detail: THERAPIES SERIES
End: 2018-03-15
Attending: HOSPITALIST
Payer: MEDICARE

## 2018-03-15 PROCEDURE — 97110 THERAPEUTIC EXERCISES: CPT | Mod: GP | Performed by: PHYSICAL THERAPIST

## 2018-03-15 PROCEDURE — 40000185 ZZHC STATISTIC PT OUTPT VISIT: Performed by: PHYSICAL THERAPIST

## 2018-03-20 ENCOUNTER — HOSPITAL ENCOUNTER (OUTPATIENT)
Dept: PHYSICAL THERAPY | Facility: CLINIC | Age: 68
Setting detail: THERAPIES SERIES
End: 2018-03-20
Attending: HOSPITALIST
Payer: MEDICARE

## 2018-03-20 PROCEDURE — 97110 THERAPEUTIC EXERCISES: CPT | Mod: GP | Performed by: PHYSICAL THERAPIST

## 2018-03-20 PROCEDURE — 40000185 ZZHC STATISTIC PT OUTPT VISIT: Performed by: PHYSICAL THERAPIST

## 2018-03-22 ENCOUNTER — HOSPITAL ENCOUNTER (OUTPATIENT)
Dept: PHYSICAL THERAPY | Facility: CLINIC | Age: 68
Setting detail: THERAPIES SERIES
End: 2018-03-22
Attending: HOSPITALIST
Payer: MEDICARE

## 2018-03-22 PROCEDURE — 40000185 ZZHC STATISTIC PT OUTPT VISIT: Performed by: PHYSICAL THERAPIST

## 2018-03-22 PROCEDURE — 97110 THERAPEUTIC EXERCISES: CPT | Mod: GP | Performed by: PHYSICAL THERAPIST

## 2018-03-26 ENCOUNTER — HOSPITAL ENCOUNTER (OUTPATIENT)
Dept: PHYSICAL THERAPY | Facility: CLINIC | Age: 68
Setting detail: THERAPIES SERIES
End: 2018-03-26
Attending: HOSPITALIST
Payer: MEDICARE

## 2018-03-26 PROCEDURE — 40000185 ZZHC STATISTIC PT OUTPT VISIT: Performed by: PHYSICAL THERAPIST

## 2018-03-26 PROCEDURE — 97110 THERAPEUTIC EXERCISES: CPT | Mod: GP | Performed by: PHYSICAL THERAPIST

## 2018-04-03 ENCOUNTER — HOSPITAL ENCOUNTER (OUTPATIENT)
Dept: PHYSICAL THERAPY | Facility: CLINIC | Age: 68
Setting detail: THERAPIES SERIES
End: 2018-04-03
Attending: HOSPITALIST
Payer: MEDICARE

## 2018-04-03 PROCEDURE — 97110 THERAPEUTIC EXERCISES: CPT | Mod: GP | Performed by: PHYSICAL THERAPIST

## 2018-04-03 PROCEDURE — 40000185 ZZHC STATISTIC PT OUTPT VISIT: Performed by: PHYSICAL THERAPIST

## 2018-04-10 ENCOUNTER — HOSPITAL ENCOUNTER (OUTPATIENT)
Dept: PHYSICAL THERAPY | Facility: CLINIC | Age: 68
Setting detail: THERAPIES SERIES
End: 2018-04-10
Attending: HOSPITALIST
Payer: MEDICARE

## 2018-04-10 PROCEDURE — 97110 THERAPEUTIC EXERCISES: CPT | Mod: GP | Performed by: PHYSICAL THERAPIST

## 2018-04-10 PROCEDURE — 40000185 ZZHC STATISTIC PT OUTPT VISIT: Performed by: PHYSICAL THERAPIST

## 2018-04-10 NOTE — PROGRESS NOTES
Outpatient Physical Therapy Discharge Note     Patient: Vincenzo Moss  : 1950    Beginning/End Dates of Reporting Period:  18 to 4/10/2018    Referring Provider: Dr. Theresa Grant    Therapy Diagnosis: Impaired activity tolerance     Client Self Report: Still very compliant with HEP walking and doing exs.  Reports 10# weight restriction lifted today but was told to continue with 20# restriction for a little longer. BP relatively stable    Objective Measurements:    6MWT 1570 ft. (initial was 1125 ft)     Goals:  Goal Identifier 6MWT   Goal Description Client will be able to ambulate at least 1500 ft on 6MWT to demonstrate improved activity tolerance   Target Date 18   Date Met      Progress:  Goal met.     Goal Identifier HEP   Goal Description Client will be independent in HEP and knowledgeable in what he can continue to work on at the [facility] in order to continue to improver overall strength and activity tolerance   Target Date 18   Date Met      Progress:Goal met.      Progress Toward Goals:   Progress this reporting period: Client's BP appears to have stabilized with exercise. 6MWT are closer to age/gender norms.   We were able to challenge him at he last appointment with inclines on the TM and increasing weights up to 20# with exercises and he tolerated this very well. Feel he is ready for discharge at this time.     Plan:  Discharge from therapy.    Discharge:    Reason for Discharge: Patient has met all goals.    Equipment Issued: none    Discharge Plan: Patient to continue home program.

## 2018-06-06 DIAGNOSIS — Z98.890 S/P AORTIC DISSECTION REPAIR: ICD-10-CM

## 2018-06-06 RX ORDER — METOPROLOL TARTRATE 50 MG
50 TABLET ORAL 2 TIMES DAILY
Qty: 60 TABLET | Refills: 0 | Status: SHIPPED | OUTPATIENT
Start: 2018-06-06 | End: 2018-07-03

## 2018-06-13 ENCOUNTER — OFFICE VISIT (OUTPATIENT)
Dept: CARDIOLOGY | Facility: CLINIC | Age: 68
End: 2018-06-13
Attending: INTERNAL MEDICINE
Payer: MEDICARE

## 2018-06-13 VITALS
SYSTOLIC BLOOD PRESSURE: 143 MMHG | DIASTOLIC BLOOD PRESSURE: 72 MMHG | BODY MASS INDEX: 31.64 KG/M2 | WEIGHT: 221 LBS | HEIGHT: 70 IN | HEART RATE: 66 BPM

## 2018-06-13 DIAGNOSIS — Z98.890 S/P AORTIC DISSECTION REPAIR: ICD-10-CM

## 2018-06-13 DIAGNOSIS — R35.1 NOCTURIA: Primary | ICD-10-CM

## 2018-06-13 DIAGNOSIS — I10 BENIGN ESSENTIAL HYPERTENSION: ICD-10-CM

## 2018-06-13 LAB
ANION GAP SERPL CALCULATED.3IONS-SCNC: 11.3 MMOL/L (ref 6–17)
BUN SERPL-MCNC: 11 MG/DL (ref 7–30)
CALCIUM SERPL-MCNC: 9.6 MG/DL (ref 8.5–10.5)
CHLORIDE SERPL-SCNC: 102 MMOL/L (ref 98–107)
CO2 SERPL-SCNC: 30 MMOL/L (ref 23–29)
CREAT SERPL-MCNC: 1.04 MG/DL (ref 0.7–1.3)
GFR SERPL CREATININE-BSD FRML MDRD: 71 ML/MIN/1.7M2
GLUCOSE SERPL-MCNC: 103 MG/DL (ref 70–105)
POTASSIUM SERPL-SCNC: 4.3 MMOL/L (ref 3.5–5.1)
SODIUM SERPL-SCNC: 139 MMOL/L (ref 136–145)

## 2018-06-13 PROCEDURE — 80048 BASIC METABOLIC PNL TOTAL CA: CPT | Performed by: INTERNAL MEDICINE

## 2018-06-13 PROCEDURE — 36415 COLL VENOUS BLD VENIPUNCTURE: CPT | Performed by: INTERNAL MEDICINE

## 2018-06-13 PROCEDURE — 99214 OFFICE O/P EST MOD 30 MIN: CPT | Performed by: NURSE PRACTITIONER

## 2018-06-13 RX ORDER — LOSARTAN POTASSIUM 25 MG/1
100 TABLET ORAL DAILY
COMMUNITY
Start: 2018-06-13 | End: 2022-12-07

## 2018-06-13 RX ORDER — FUROSEMIDE 20 MG
10 TABLET ORAL DAILY
Qty: 45 TABLET | Refills: 3 | Status: SHIPPED | OUTPATIENT
Start: 2018-06-13 | End: 2019-08-27

## 2018-06-13 RX ORDER — DOXAZOSIN 4 MG/1
4 TABLET ORAL AT BEDTIME
Qty: 30 TABLET | Refills: 11 | Status: SHIPPED | OUTPATIENT
Start: 2018-06-13 | End: 2021-07-07

## 2018-06-13 RX ORDER — FUROSEMIDE 20 MG
20 TABLET ORAL DAILY
Qty: 90 TABLET | Refills: 3 | Status: SHIPPED | OUTPATIENT
Start: 2018-06-13 | End: 2018-06-13

## 2018-06-13 NOTE — MR AVS SNAPSHOT
After Visit Summary   6/13/2018    Vincenzo Moss    MRN: [id number]           Patient Information     Date Of Birth          1950        Visit Information        Provider Department      6/13/2018 11:00 AM Tamara Butt APRN CNP [facility]        Today's Diagnoses     Nocturia    -  1    S/P aortic dissection repair        Benign essential hypertension          Care Instructions    Today's Recommendations    1. Repeat echocardiogram in 2 months  2. Restart Lasix at 10 mg in the morning for blood pressure  3. Start cardura 4 mg at bedtime  4. Continue all other medications without changes.  5. Please follow up with Dr. Velazquez in 6 months.    Please send a e[employer] message or call [phone] with questions or concerns.     Scheduling number [phone].            Follow-ups after your visit        Additional Services     Follow-Up with Cardiologist                 Future tests that were ordered for you today     Open Future Orders        Priority Expected Expires Ordered    Follow-Up with Cardiologist Routine 12/10/2018 6/13/2019 6/13/2018    Echocardiogram Routine 9/11/2018 6/13/2019 6/13/2018            Who to contact     If you have questions or need follow up information about today's clinic visit or your schedule please contact [facility] directly at [phone].  Normal or non-critical lab and imaging results will be communicated to you by Postmates[employer], letter or phone within 4 business days after the clinic has received the results. If you do not hear from us within 7 days, please contact the clinic through [employer]Zer[employer] or phone. If you have a critical or abnormal lab result, we will notify you by phone as soon as possible.  Submit refill requests through e[employer]ise or call your pharmacy and they will forward the refill request to us. Please allow 3 business days for your refill to be completed.          Additional  "Information About Your Visit        MyChart Information     [employer] gives you secure access to your electronic health record. If you see a primary care provider, you can also send messages to your care team and make appointments. If you have questions, please call your primary care clinic.  If you do not have a primary care provider, please call [phone] and they will assist you.        Care EveryWhere ID     This is your Care EveryWhere ID. This could be used by other organizations to access your [place] medical records  [id number]        Your Vitals Were     Pulse Height BMI (Body Mass Index)             66 1.778 m (5' 10\") 31.71 kg/m2          Blood Pressure from Last 3 Encounters:   06/13/18 143/72   03/14/18 92/54   03/08/18 126/73    Weight from Last 3 Encounters:   06/13/18 100.2 kg (221 lb)   03/14/18 99.8 kg (220 lb)   03/08/18 99.5 kg (219 lb 4.8 oz)              We Performed the Following     Follow-Up with Cardiac Advanced Practice Provider          Today's Medication Changes          These changes are accurate as of 6/13/18 11:24 AM.  If you have any questions, ask your nurse or doctor.               Start taking these medicines.        Dose/Directions    doxazosin 4 MG tablet   Commonly known as:  CARDURA   Used for:  Benign essential hypertension, Nocturia   Started by:  Tamara Butt APRN CNP        Dose:  4 mg   Take 1 tablet (4 mg) by mouth At Bedtime   Quantity:  30 tablet   Refills:  11       furosemide 20 MG tablet   Commonly known as:  LASIX   Used for:  S/P aortic dissection repair, Benign essential hypertension   Started by:  Tamara Butt APRN CNP        Dose:  10 mg   Take 0.5 tablets (10 mg) by mouth daily   Quantity:  45 tablet   Refills:  3            Where to get your medicines      These medications were sent to [place] [address] [place] [facility] [address] [place] [address]  [address] [facility] [address]     Phone:  [phone]     doxazosin 4 MG tablet    furosemide " 20 MG tablet                Primary Care Provider Office Phone # Fax #    Michael Mcbride [phone]       [facility] [address] [facility] [address]  [facility] [address]        Equal Access to Services     KATI CONWAY : Hadcorrie raymundo ku parrisho Sovaleriaali, waaxda luqadaha, qaybta kaalmada adeegyada, waxbrinda idiin portern marcus jefferson jace torres. So [facility] [phone].    ATENCIÓN: Si habla español, tiene a antonio disposición servicios gratuitos de asistencia lingüística. Llame al [phone].    We comply with applicable federal civil rights laws and [place] laws. We do not discriminate on the basis of race, color, national origin, age, disability, sex, sexual orientation, or gender identity.            Thank you!     Thank you for choosing [facility]  for your care. Our goal is always to provide you with excellent care. Hearing back from our patients is one way we can continue to improve our services. Please take a few minutes to complete the written survey that you may receive in the mail after your visit with us. Thank you!             Your Updated Medication List - Protect others around you: Learn how to safely use, store and throw away your medicines at www.disposemymeds.org.          This list is accurate as of 6/13/18 11:24 AM.  Always use your most recent med list.                   Brand Name Dispense Instructions for use Diagnosis    aspirin 81 MG chewable tablet     30 tablet    Take 1 tablet (81 mg) by mouth daily    S/P aortic dissection repair       atorvastatin 10 MG tablet    LIPITOR    30 tablet    Take 1 tablet (10 mg) by mouth daily    S/P aortic dissection repair       CoQ-10 10 MG Caps      Take 100 mg by mouth daily        doxazosin 4 MG tablet    CARDURA    30 tablet    Take 1 tablet (4 mg) by mouth At Bedtime    Benign essential hypertension, Nocturia       furosemide 20 MG tablet    LASIX    45 tablet    Take 0.5 tablets (10 mg) by mouth daily     S/P aortic dissection repair, Benign essential hypertension       losartan 25 MG tablet    COZAAR     Take 2 tablets (50 mg) by mouth 2 times daily    S/P aortic dissection repair       metoprolol tartrate 50 MG tablet    LOPRESSOR    60 tablet    Take 1 tablet (50 mg) by mouth 2 times daily    S/P aortic dissection repair       Multi-vitamin Tabs tablet      Take 1 tablet by mouth daily        senna-docusate 8.6-50 MG per tablet    SENOKOT-S;PERICOLACE    100 tablet    Take 1 tablet by mouth 2 times daily as needed for constipation    S/P aortic dissection repair       VITAMIN D (CHOLECALCIFEROL) PO      Take 2,000 Units by mouth daily

## 2018-06-13 NOTE — PATIENT INSTRUCTIONS
Today's Recommendations    1. Repeat echocardiogram in 2 months  2. Restart Lasix at 10 mg in the morning for blood pressure  3. Start cardura 4 mg at bedtime  4. Continue all other medications without changes.  5. Please follow up with Dr. Velazquez in 6 months.    Please send a [employer] message or call [phone] with questions or concerns.     Scheduling number [phone].

## 2018-06-13 NOTE — PROGRESS NOTES
Cardiology Clinic Progress Note  Vincenzo Moss MRN# [id number]   YOB: 1950 Age: 67 year old     Reason for visit: Hypertension and ascending aortic aneurysm status post repair           Assessment and Plan:     1. Ascending aortic aneurysm (6.8 cm) status post emergent repair with valve sparing operation as patient has trileaflet aortic valve    Continue avoiding heavy lifting    Patient states that 1 of his children has been screened and does not have any aortic valve or aortic abnormalities but 2 of his children have not been screened as of yet    Continue blood pressure control as below    Repeat echocardiogram in 2 months    Follow-up with Dr. Velazquez in 6 months    2. Hypertension    At last office visit furosemide 10 mg daily in the morning was added for aid in edema and blood pressure control.  Subsequently this was discontinued by his primary care provider.    Losartan has been increased to 50 mg twice daily with continued suboptimal control    Patient reported increased nocturia while taking furosemide.    Restart Lasix 10 mg daily    Start Cardura 4 mg daily for hypertension and nocturia         History of Presenting Illness:    Vincenzo Moss is a very pleasant 67 year old patient of Dr. Velazquez who presents today for a follow-up status post emergent ascending thoracic aneurysm repair emergently in February 2018 with Dr. Toledo.  He had a valve sparing operation this patient has a tri-leaflet aortic valve.  He also has a history of hypertension and BPH status post TURP.    Today in clinic he presents with his wife Concepcion.  They bring in a extensive blood pressure log showing that his blood pressure remained suboptimally controlled with pressures ranging in the 140s and 150s at times.  He states that his furosemide was discontinued shortly after initiation by his primary care provider for no signs of lower extremity edema.  He reports while taking the furosemide increased nocturia.    He  "states that he and his wife walk 1-2 miles daily and recently returned from a trip where they did a large amount of walking.  He denies any dyspnea on exertion, chest discomfort, lower extremity edema orthopnea, or PND.  He does note occasional palpitations that radiate up into his throat.  He describes them as regular and not fast.  He does not have any associated lightheadedness or dizziness.  Discussed that if in the future if the symptoms become more pronounced and he notices an irregularity to his rhythm, consider a cardiac monitor.          This note was completed in part using Dragon voice recognition software. Although reviewed after completion, some word and grammatical errors may occur       Review of Systems:   Review of Systems:  Skin:  Negative     Eyes:  Positive for glasses  ENT:  Negative    Respiratory:  Negative    Cardiovascular:    Positive for;lightheadedness;heaviness (occasional pressure in his upper chest. 'can feel it beating harder')  Gastroenterology: Negative    Genitourinary:  not assessed    Musculoskeletal:  Negative    Neurologic:  Positive for headaches (occasional)  Psychiatric:  Negative    Heme/Lymph/Imm:  Negative    Endocrine:  Negative                Physical Exam:     Vitals: /72  Pulse 66  Ht 1.778 m (5' 10\")  Wt 100.2 kg (221 lb)  BMI 31.71 kg/m2  Constitutional:  cooperative, alert and oriented, well developed, well nourished, in no acute distress        Skin:  warm and dry to the touch, no apparent skin lesions or masses noted surgical scars well-healed      Head:  normocephalic, no masses or lesions        Eyes:  pupils equal and round, conjunctivae and lids unremarkable, sclera white, no xanthalasma, EOMS intact, no nystagmus        ENT:  no pallor or cyanosis, dentition good        Neck:  JVP normal        Chest:  normal breath sounds, clear to auscultation, normal A-P diameter, normal symmetry, normal respiratory excursion, no use of accessory muscles    "     Cardiac: regular rhythm       RUSB;grade 1;early systolic murmur          Abdomen:  abdomen soft   benign    Extremities and Back:  no deformities, clubbing, cyanosis, erythema observed;no edema        Neurological:  no gross motor deficits;affect appropriate               Medications:     Current Outpatient Prescriptions   Medication Sig Dispense Refill     aspirin 81 MG chewable tablet Take 1 tablet (81 mg) by mouth daily 30 tablet 3     atorvastatin (LIPITOR) 10 MG tablet Take 1 tablet (10 mg) by mouth daily 30 tablet 3     Coenzyme Q10 (COQ-10) 10 MG CAPS Take 100 mg by mouth daily       doxazosin (CARDURA) 4 MG tablet Take 1 tablet (4 mg) by mouth At Bedtime 30 tablet 11     furosemide (LASIX) 20 MG tablet Take 0.5 tablets (10 mg) by mouth daily 45 tablet 3     losartan (COZAAR) 25 MG tablet Take 2 tablets (50 mg) by mouth 2 times daily       metoprolol tartrate (LOPRESSOR) 50 MG tablet Take 1 tablet (50 mg) by mouth 2 times daily 60 tablet 0     multivitamin, therapeutic with minerals (MULTI-VITAMIN) TABS tablet Take 1 tablet by mouth daily       VITAMIN D, CHOLECALCIFEROL, PO Take 2,000 Units by mouth daily       senna-docusate (SENOKOT-S;PERICOLACE) 8.6-50 MG per tablet Take 1 tablet by mouth 2 times daily as needed for constipation (Patient not taking: Reported on 6/13/2018) 100 tablet 1     [DISCONTINUED] furosemide (LASIX) 20 MG tablet Take 1 tablet (20 mg) by mouth daily 90 tablet 3     [DISCONTINUED] furosemide (LASIX) 20 MG tablet Take 0.5 tablets (10 mg) by mouth daily (Patient not taking: Reported on 6/13/2018) 45 tablet 3     [DISCONTINUED] losartan (COZAAR) 25 MG tablet Take 1 tablet (25 mg) by mouth daily (Patient taking differently: Take 50 mg by mouth 2 times daily ) 30 tablet 3           Family History   Problem Relation Age of Onset     DIABETES Mother      late in life     Pancreatic Cancer Mother      Hypertension Father      possible MI     Cirrhosis Father        Social History      Social History     Marital status:      Spouse name: N/A     Number of children: N/A     Years of education: N/A     Occupational History     Not on file.     Social History Main Topics     Smoking status: Never Smoker     Smokeless tobacco: Never Used     Alcohol use No     Drug use: No     Sexual activity: Not on file     Other Topics Concern     Parent/Sibling W/ Cabg, Mi Or Angioplasty Before 65f 55m? No     Social History Narrative            Past Medical History:     Past Medical History:   Diagnosis Date     BPH (benign prostatic hyperplasia)      GERD (gastroesophageal reflux disease)      [place] (hard of hearing)      Hyperlipidemia      Hypertension      Urinary frequency               Past Surgical History:     Past Surgical History:   Procedure Laterality Date     COLONOSCOPY       CYSTOSCOPY, TRANSURETHRAL RESECTION (TUR) PROSTATE, COMBINED N/A 12/12/2016    Procedure: COMBINED CYSTOSCOPY, TRANSURETHRAL RESECTION (TUR) PROSTATE;  Surgeon: Derian Dash MD;  Location:  OR     HEAD & NECK SURGERY      brain surgery after childhood trauma     HERNIA REPAIR       ORTHOPEDIC SURGERY       REPAIR ANEURYSM ASCENDING AORTA N/A 2/13/2018    Procedure: REPAIR ANEURYSM ASCENDING AORTA;  TYPE A  ASCENDING AORTIC ANEURYSM REPAIR WITH INTERPOSITION GRAFT-HEMASHIELD PLATINUM WOVEN GRAFT D:34MM L:30MM  (ON PUMP/OLI);  Surgeon: Naomie Toledo MD;  Location:  OR              Allergies:   Review of patient's allergies indicates no known allergies.       Data:   All laboratory data reviewed:    LAST CHOLESTEROL:  No results found for: CHOL  No results found for: HDL  No results found for: LDL  No results found for: TRIG  No results found for: CHOLHDLRATIO    LAST BMP:  Lab Results   Component Value Date     06/13/2018      Lab Results   Component Value Date    POTASSIUM 4.3 06/13/2018     Lab Results   Component Value Date    CHLORIDE 102 06/13/2018     Lab Results   Component Value Date     TAMARA 9.6 06/13/2018     Lab Results   Component Value Date    CO2 30 06/13/2018     Lab Results   Component Value Date    BUN 11 06/13/2018     Lab Results   Component Value Date    CR 1.04 06/13/2018     Lab Results   Component Value Date     06/13/2018       LAST CBC:  Lab Results   Component Value Date    WBC 6.9 02/18/2018     Lab Results   Component Value Date    RBC 3.28 02/18/2018     Lab Results   Component Value Date    HGB 10.0 02/18/2018     Lab Results   Component Value Date    HCT 29.9 02/18/2018     Lab Results   Component Value Date    MCV 91 02/18/2018     Lab Results   Component Value Date    MCH 30.5 02/18/2018     Lab Results   Component Value Date    MCHC 33.4 02/18/2018     Lab Results   Component Value Date    RDW 13.7 02/18/2018     Lab Results   Component Value Date     02/19/2018         MARQUITA BERGERON, APRN, CNP

## 2018-06-13 NOTE — LETTER
6/13/2018    Michael Mcbride MD  [facility] [address]  [facility] [address]    RE: Vincenzo Moss       Dear Colleague,    I had the pleasure of seeing Vincenzo Moss in the [facility].    Cardiology Clinic Progress Note  Vincenzo Moss MRN# [id number]   YOB: 1950 Age: 67 year old     Reason for visit: Hypertension and ascending aortic aneurysm status post repair           Assessment and Plan:     1. Ascending aortic aneurysm (6.8 cm) status post emergent repair with valve sparing operation as patient has trileaflet aortic valve    Continue avoiding heavy lifting    Patient states that 1 of his children has been screened and does not have any aortic valve or aortic abnormalities but 2 of his children have not been screened as of yet    Continue blood pressure control as below    Repeat echocardiogram in 2 months    Follow-up with Dr. Velazquez in 6 months    2. Hypertension    At last office visit furosemide 10 mg daily in the morning was added for aid in edema and blood pressure control.  Subsequently this was discontinued by his primary care provider.    Losartan has been increased to 50 mg twice daily with continued suboptimal control    Patient reported increased nocturia while taking furosemide.    Restart Lasix 10 mg daily    Start Cardura 4 mg daily for hypertension and nocturia         History of Presenting Illness:    Vincenzo Moss is a very pleasant 67 year old patient of Dr. Velazquez who presents today for a follow-up status post emergent ascending thoracic aneurysm repair emergently in February 2018 with Dr. Toledo.  He had a valve sparing operation this patient has a tri-leaflet aortic valve.  He also has a history of hypertension and BPH status post TURP.    Today in clinic he presents with his wife Concepcion.  They bring in a extensive blood pressure log showing that his blood pressure remained suboptimally controlled with pressures  "ranging in the 140s and 150s at times.  He states that his furosemide was discontinued shortly after initiation by his primary care provider for no signs of lower extremity edema.  He reports while taking the furosemide increased nocturia.    He states that he and his wife walk 1-2 miles daily and recently returned from a trip where they did a large amount of walking.  He denies any dyspnea on exertion, chest discomfort, lower extremity edema orthopnea, or PND.  He does note occasional palpitations that radiate up into his throat.  He describes them as regular and not fast.  He does not have any associated lightheadedness or dizziness.  Discussed that if in the future if the symptoms become more pronounced and he notices an irregularity to his rhythm, consider a cardiac monitor.          This note was completed in part using Dragon voice recognition software. Although reviewed after completion, some word and grammatical errors may occur       Review of Systems:   Review of Systems:  Skin:  Negative     Eyes:  Positive for glasses  ENT:  Negative    Respiratory:  Negative    Cardiovascular:    Positive for;lightheadedness;heaviness (occasional pressure in his upper chest. 'can feel it beating harder')  Gastroenterology: Negative    Genitourinary:  not assessed    Musculoskeletal:  Negative    Neurologic:  Positive for headaches (occasional)  Psychiatric:  Negative    Heme/Lymph/Imm:  Negative    Endocrine:  Negative                Physical Exam:     Vitals: /72  Pulse 66  Ht 1.778 m (5' 10\")  Wt 100.2 kg (221 lb)  BMI 31.71 kg/m2  Constitutional:  cooperative, alert and oriented, well developed, well nourished, in no acute distress        Skin:  warm and dry to the touch, no apparent skin lesions or masses noted surgical scars well-healed      Head:  normocephalic, no masses or lesions        Eyes:  pupils equal and round, conjunctivae and lids unremarkable, sclera white, no xanthalasma, EOMS intact, no " nystagmus        ENT:  no pallor or cyanosis, dentition good        Neck:  JVP normal        Chest:  normal breath sounds, clear to auscultation, normal A-P diameter, normal symmetry, normal respiratory excursion, no use of accessory muscles        Cardiac: regular rhythm       RUSB;grade 1;early systolic murmur          Abdomen:  abdomen soft   benign    Extremities and Back:  no deformities, clubbing, cyanosis, erythema observed;no edema        Neurological:  no gross motor deficits;affect appropriate               Medications:     Current Outpatient Prescriptions   Medication Sig Dispense Refill     aspirin 81 MG chewable tablet Take 1 tablet (81 mg) by mouth daily 30 tablet 3     atorvastatin (LIPITOR) 10 MG tablet Take 1 tablet (10 mg) by mouth daily 30 tablet 3     Coenzyme Q10 (COQ-10) 10 MG CAPS Take 100 mg by mouth daily       doxazosin (CARDURA) 4 MG tablet Take 1 tablet (4 mg) by mouth At Bedtime 30 tablet 11     furosemide (LASIX) 20 MG tablet Take 0.5 tablets (10 mg) by mouth daily 45 tablet 3     losartan (COZAAR) 25 MG tablet Take 2 tablets (50 mg) by mouth 2 times daily       metoprolol tartrate (LOPRESSOR) 50 MG tablet Take 1 tablet (50 mg) by mouth 2 times daily 60 tablet 0     multivitamin, therapeutic with minerals (MULTI-VITAMIN) TABS tablet Take 1 tablet by mouth daily       VITAMIN D, CHOLECALCIFEROL, PO Take 2,000 Units by mouth daily       senna-docusate (SENOKOT-S;PERICOLACE) 8.6-50 MG per tablet Take 1 tablet by mouth 2 times daily as needed for constipation (Patient not taking: Reported on 6/13/2018) 100 tablet 1     [DISCONTINUED] furosemide (LASIX) 20 MG tablet Take 1 tablet (20 mg) by mouth daily 90 tablet 3     [DISCONTINUED] furosemide (LASIX) 20 MG tablet Take 0.5 tablets (10 mg) by mouth daily (Patient not taking: Reported on 6/13/2018) 45 tablet 3     [DISCONTINUED] losartan (COZAAR) 25 MG tablet Take 1 tablet (25 mg) by mouth daily (Patient taking differently: Take 50 mg by mouth  2 times daily ) 30 tablet 3           Family History   Problem Relation Age of Onset     DIABETES Mother      late in life     Pancreatic Cancer Mother      Hypertension Father      possible MI     Cirrhosis Father        Social History     Social History     Marital status:      Spouse name: N/A     Number of children: N/A     Years of education: N/A     Occupational History     Not on file.     Social History Main Topics     Smoking status: Never Smoker     Smokeless tobacco: Never Used     Alcohol use No     Drug use: No     Sexual activity: Not on file     Other Topics Concern     Parent/Sibling W/ Cabg, Mi Or Angioplasty Before 65f 55m? No     Social History Narrative            Past Medical History:     Past Medical History:   Diagnosis Date     BPH (benign prostatic hyperplasia)      GERD (gastroesophageal reflux disease)      [place] (hard of hearing)      Hyperlipidemia      Hypertension      Urinary frequency               Past Surgical History:     Past Surgical History:   Procedure Laterality Date     COLONOSCOPY       CYSTOSCOPY, TRANSURETHRAL RESECTION (TUR) PROSTATE, COMBINED N/A 12/12/2016    Procedure: COMBINED CYSTOSCOPY, TRANSURETHRAL RESECTION (TUR) PROSTATE;  Surgeon: Derian Dash MD;  Location:  OR     HEAD & NECK SURGERY      brain surgery after childhood trauma     HERNIA REPAIR       ORTHOPEDIC SURGERY       REPAIR ANEURYSM ASCENDING AORTA N/A 2/13/2018    Procedure: REPAIR ANEURYSM ASCENDING AORTA;  TYPE A  ASCENDING AORTIC ANEURYSM REPAIR WITH INTERPOSITION GRAFT-HEMASHIELD PLATINUM WOVEN GRAFT D:34MM L:30MM  (ON PUMP/OLI);  Surgeon: Naomie Toledo MD;  Location:  OR              Allergies:   Review of patient's allergies indicates no known allergies.       Data:   All laboratory data reviewed:    LAST CHOLESTEROL:  No results found for: CHOL  No results found for: HDL  No results found for: LDL  No results found for: TRIG  No results found for: CHOLHDLRATIO    LAST  BMP:  Lab Results   Component Value Date     06/13/2018      Lab Results   Component Value Date    POTASSIUM 4.3 06/13/2018     Lab Results   Component Value Date    CHLORIDE 102 06/13/2018     Lab Results   Component Value Date    TAMARA 9.6 06/13/2018     Lab Results   Component Value Date    CO2 30 06/13/2018     Lab Results   Component Value Date    BUN 11 06/13/2018     Lab Results   Component Value Date    CR 1.04 06/13/2018     Lab Results   Component Value Date     06/13/2018       LAST CBC:  Lab Results   Component Value Date    WBC 6.9 02/18/2018     Lab Results   Component Value Date    RBC 3.28 02/18/2018     Lab Results   Component Value Date    HGB 10.0 02/18/2018     Lab Results   Component Value Date    HCT 29.9 02/18/2018     Lab Results   Component Value Date    MCV 91 02/18/2018     Lab Results   Component Value Date    MCH 30.5 02/18/2018     Lab Results   Component Value Date    MCHC 33.4 02/18/2018     Lab Results   Component Value Date    RDW 13.7 02/18/2018     Lab Results   Component Value Date     02/19/2018             Thank you for allowing me to participate in the care of your patient.      Sincerely,     [facility]

## 2018-07-03 DIAGNOSIS — Z98.890 S/P AORTIC DISSECTION REPAIR: ICD-10-CM

## 2018-07-03 RX ORDER — METOPROLOL TARTRATE 50 MG
50 TABLET ORAL 2 TIMES DAILY
Qty: 180 TABLET | Refills: 3 | Status: SHIPPED | OUTPATIENT
Start: 2018-07-03 | End: 2018-07-21

## 2018-07-21 ENCOUNTER — TELEPHONE (OUTPATIENT)
Dept: LAB | Facility: CLINIC | Age: 68
End: 2018-07-21

## 2018-07-21 DIAGNOSIS — Z98.890 S/P AORTIC DISSECTION REPAIR: ICD-10-CM

## 2018-07-21 RX ORDER — METOPROLOL TARTRATE 50 MG
50 TABLET ORAL 2 TIMES DAILY
Qty: 60 TABLET | Refills: 0 | Status: SHIPPED | OUTPATIENT
Start: 2018-07-21 | End: 2020-06-10

## 2018-08-21 ENCOUNTER — HOSPITAL ENCOUNTER (OUTPATIENT)
Dept: CARDIOLOGY | Facility: CLINIC | Age: 68
Discharge: HOME | End: 2018-08-21
Attending: NURSE PRACTITIONER | Admitting: NURSE PRACTITIONER
Payer: MEDICARE

## 2018-08-21 DIAGNOSIS — Z98.890 S/P AORTIC DISSECTION REPAIR: ICD-10-CM

## 2018-08-21 DIAGNOSIS — I10 BENIGN ESSENTIAL HYPERTENSION: ICD-10-CM

## 2018-08-21 PROCEDURE — 93306 TTE W/DOPPLER COMPLETE: CPT

## 2018-08-21 PROCEDURE — 93306 TTE W/DOPPLER COMPLETE: CPT | Mod: 26 | Performed by: INTERNAL MEDICINE

## 2019-03-15 ENCOUNTER — OFFICE VISIT (OUTPATIENT)
Dept: CARDIOLOGY | Facility: CLINIC | Age: 69
End: 2019-03-15
Payer: MEDICARE

## 2019-03-15 VITALS
HEART RATE: 60 BPM | SYSTOLIC BLOOD PRESSURE: 128 MMHG | DIASTOLIC BLOOD PRESSURE: 70 MMHG | WEIGHT: 222 LBS | HEIGHT: 70 IN | BODY MASS INDEX: 31.78 KG/M2

## 2019-03-15 DIAGNOSIS — I10 BENIGN ESSENTIAL HYPERTENSION: ICD-10-CM

## 2019-03-15 DIAGNOSIS — Z98.890 S/P AORTIC DISSECTION REPAIR: ICD-10-CM

## 2019-03-15 DIAGNOSIS — R35.1 NOCTURIA: ICD-10-CM

## 2019-03-15 PROCEDURE — 99214 OFFICE O/P EST MOD 30 MIN: CPT | Performed by: INTERNAL MEDICINE

## 2019-03-15 RX ORDER — HYDROCHLOROTHIAZIDE 12.5 MG/1
12.5 TABLET ORAL DAILY
COMMUNITY
End: 2020-03-26

## 2019-03-15 ASSESSMENT — MIFFLIN-ST. JEOR: SCORE: 1783.24

## 2019-03-15 NOTE — LETTER
3/15/2019    Michael Mcbride MD  [facility] [address]  [facility] [address]    RE: Vincenzo Moss       Dear Colleague,    I had the pleasure of seeing Vincenzo Moss in the [facility].    Cardiology Progress Note          Assessment and Plan:     1. 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    Repeat CT for postoperative anatomy and rule out descending dissection or aneurysm    Continue good blood pressure control.      2. Fatigue and blunted heart rates with exertion    Patient only sees 110 bpm with exertion.  Have asked him to try cutting his metoprolol tartrate down from 50 mg twice daily down to 25 mg twice daily.    May consider increasing his hydrochlorothiazide or furosemide if blood pressure creeps up.    Routine follow-up in 1 year, sooner if symptoms arise or if CT abnormal.    This note was transcribed using electronic voice recognition software and there may be typographical errors present.                Interval History:   The patient is a very pleasant 68 year old whom I met about a year ago for follow-up of his ascending aortic aneurysm repair.  We reviewed his presentation further today as we had more time.  We reviewed his CT scan on presentation February 2018.  He had very large fusiform ascending aortic aneurysm with concern for dissection.  Transesophageal echocardiogram had hemopericardium and a concern for a flap in the ascending aorta.  Descending aorta was normal size.  We did review the operative report and it does appear that he did indeed have a dissection in the ascending aorta that did not extend down into the root and it did not extend into the arch.  He was very fortunate in that regard.  He had an interposition graft placed.  He has had follow-up echocardiogram that appeared that the graft was functioning normally.  He has not had repeat CT  "imaging after his graft was placed.                     Review of Systems:   Review of Systems:  Skin:  Negative     Eyes:  Positive for glasses  ENT:  Negative    Respiratory:  Negative    Cardiovascular:    Positive for(chest discomfort near sternum)  Gastroenterology: Negative    Genitourinary:  Negative    Musculoskeletal:  Negative    Neurologic:  Positive for numbness or tingling of hands(numbness in neck and down right arm)  Psychiatric:  Negative    Heme/Lymph/Imm:  Negative    Endocrine:  Negative                Physical Exam:     Vitals: /70   Pulse 60   Ht 1.778 m (5' 10\")   Wt 100.7 kg (222 lb)   BMI 31.85 kg/m     Constitutional:  cooperative, alert and oriented, well developed, well nourished, in no acute distress        Skin:  warm and dry to the touch, no apparent skin lesions or masses noted surgical scars well-healed      Head:  normocephalic, no masses or lesions        Eyes:  pupils equal and round, conjunctivae and lids unremarkable, sclera white, no xanthalasma, EOMS intact, no nystagmus        ENT:  no pallor or cyanosis, dentition good        Neck:  JVP normal        Chest:  normal breath sounds, clear to auscultation, normal A-P diameter, normal symmetry, normal respiratory excursion, no use of accessory muscles        Cardiac: regular rhythm       RUSB;grade 1;early systolic murmur          Abdomen:      benign    Vascular:                                   symmetric radial pulses    Extremities and Back:  no deformities, clubbing, cyanosis, erythema observed;no edema        Neurological:  no gross motor deficits;affect appropriate                 Medications:     Current Outpatient Medications   Medication Sig Dispense Refill     aspirin 81 MG chewable tablet Take 1 tablet (81 mg) by mouth daily 30 tablet 3     atorvastatin (LIPITOR) 10 MG tablet Take 1 tablet (10 mg) by mouth daily (Patient taking differently: Take 20 mg by mouth daily ) 30 tablet 3     calcium citrate-vitamin D " (CITRACAL) 315-200 MG-UNIT TABS per tablet Take 1 tablet by mouth 2 times daily       Coenzyme Q10 (COQ-10) 10 MG CAPS Take 100 mg by mouth daily       furosemide (LASIX) 20 MG tablet Take 0.5 tablets (10 mg) by mouth daily (Patient taking differently: Take 10 mg by mouth every other day ) 45 tablet 3     hydrochlorothiazide (HYDRODIURIL) 12.5 MG tablet Take 12.5 mg by mouth daily       losartan (COZAAR) 25 MG tablet Take 2 tablets (50 mg) by mouth 2 times daily       metoprolol tartrate (LOPRESSOR) 50 MG tablet Take 1 tablet (50 mg) by mouth 2 times daily 60 tablet 0     VITAMIN D, CHOLECALCIFEROL, PO Take 2,000 Units by mouth daily       doxazosin (CARDURA) 4 MG tablet Take 1 tablet (4 mg) by mouth At Bedtime 30 tablet 11     multivitamin, therapeutic with minerals (MULTI-VITAMIN) TABS tablet Take 1 tablet by mouth daily       senna-docusate (SENOKOT-S;PERICOLACE) 8.6-50 MG per tablet Take 1 tablet by mouth 2 times daily as needed for constipation (Patient not taking: Reported on 6/13/2018) 100 tablet 1                Data:   All laboratory data reviewed  No results found for this or any previous visit (from the past 24 hour(s)).    All laboratory data reviewed  No results found for: CHOL  No results found for: HDL  No results found for: LDL  No results found for: TRIG  No results found for: CHOLHDLRATIO  No results found for: TSH  Last Basic Metabolic Panel:  Lab Results   Component Value Date     06/13/2018      Lab Results   Component Value Date    POTASSIUM 4.3 06/13/2018     Lab Results   Component Value Date    CHLORIDE 102 06/13/2018     Lab Results   Component Value Date    TAMARA 9.6 06/13/2018     Lab Results   Component Value Date    CO2 30 06/13/2018     Lab Results   Component Value Date    BUN 11 06/13/2018     Lab Results   Component Value Date    CR 1.04 06/13/2018     Lab Results   Component Value Date     06/13/2018     Lab Results   Component Value Date    WBC 6.9 02/18/2018     Lab  Results   Component Value Date    RBC 3.28 02/18/2018     Lab Results   Component Value Date    HGB 10.0 02/18/2018     Lab Results   Component Value Date    HCT 29.9 02/18/2018     Lab Results   Component Value Date    MCV 91 02/18/2018     Lab Results   Component Value Date    MCH 30.5 02/18/2018     Lab Results   Component Value Date    MCHC 33.4 02/18/2018     Lab Results   Component Value Date    RDW 13.7 02/18/2018     Lab Results   Component Value Date     02/19/2018             Thank you for allowing me to participate in the care of your patient.    Sincerely,     Can Velazquez MD     [facility]

## 2019-03-15 NOTE — PROGRESS NOTES
Cardiology Progress Note          Assessment and Plan:     1. 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    Repeat CT for postoperative anatomy and rule out descending dissection or aneurysm    Continue good blood pressure control.      2. Fatigue and blunted heart rates with exertion    Patient only sees 110 bpm with exertion.  Have asked him to try cutting his metoprolol tartrate down from 50 mg twice daily down to 25 mg twice daily.    May consider increasing his hydrochlorothiazide or furosemide if blood pressure creeps up.    Routine follow-up in 1 year, sooner if symptoms arise or if CT abnormal.    This note was transcribed using electronic voice recognition software and there may be typographical errors present.                Interval History:   The patient is a very pleasant 68 year old whom I met about a year ago for follow-up of his ascending aortic aneurysm repair.  We reviewed his presentation further today as we had more time.  We reviewed his CT scan on presentation February 2018.  He had very large fusiform ascending aortic aneurysm with concern for dissection.  Transesophageal echocardiogram had hemopericardium and a concern for a flap in the ascending aorta.  Descending aorta was normal size.  We did review the operative report and it does appear that he did indeed have a dissection in the ascending aorta that did not extend down into the root and it did not extend into the arch.  He was very fortunate in that regard.  He had an interposition graft placed.  He has had follow-up echocardiogram that appeared that the graft was functioning normally.  He has not had repeat CT imaging after his graft was placed.                     Review of Systems:   Review of Systems:  Skin:  Negative     Eyes:  Positive for glasses  ENT:  Negative    Respiratory:  Negative    Cardiovascular:    Positive for(chest discomfort near  "sternum)  Gastroenterology: Negative    Genitourinary:  Negative    Musculoskeletal:  Negative    Neurologic:  Positive for numbness or tingling of hands(numbness in neck and down right arm)  Psychiatric:  Negative    Heme/Lymph/Imm:  Negative    Endocrine:  Negative                Physical Exam:     Vitals: /70   Pulse 60   Ht 1.778 m (5' 10\")   Wt 100.7 kg (222 lb)   BMI 31.85 kg/m    Constitutional:  cooperative, alert and oriented, well developed, well nourished, in no acute distress        Skin:  warm and dry to the touch, no apparent skin lesions or masses noted surgical scars well-healed      Head:  normocephalic, no masses or lesions        Eyes:  pupils equal and round, conjunctivae and lids unremarkable, sclera white, no xanthalasma, EOMS intact, no nystagmus        ENT:  no pallor or cyanosis, dentition good        Neck:  JVP normal        Chest:  normal breath sounds, clear to auscultation, normal A-P diameter, normal symmetry, normal respiratory excursion, no use of accessory muscles        Cardiac: regular rhythm       RUSB;grade 1;early systolic murmur          Abdomen:      benign    Vascular:                                   symmetric radial pulses    Extremities and Back:  no deformities, clubbing, cyanosis, erythema observed;no edema        Neurological:  no gross motor deficits;affect appropriate                 Medications:     Current Outpatient Medications   Medication Sig Dispense Refill     aspirin 81 MG chewable tablet Take 1 tablet (81 mg) by mouth daily 30 tablet 3     atorvastatin (LIPITOR) 10 MG tablet Take 1 tablet (10 mg) by mouth daily (Patient taking differently: Take 20 mg by mouth daily ) 30 tablet 3     calcium citrate-vitamin D (CITRACAL) 315-200 MG-UNIT TABS per tablet Take 1 tablet by mouth 2 times daily       Coenzyme Q10 (COQ-10) 10 MG CAPS Take 100 mg by mouth daily       furosemide (LASIX) 20 MG tablet Take 0.5 tablets (10 mg) by mouth daily (Patient taking " differently: Take 10 mg by mouth every other day ) 45 tablet 3     hydrochlorothiazide (HYDRODIURIL) 12.5 MG tablet Take 12.5 mg by mouth daily       losartan (COZAAR) 25 MG tablet Take 2 tablets (50 mg) by mouth 2 times daily       metoprolol tartrate (LOPRESSOR) 50 MG tablet Take 1 tablet (50 mg) by mouth 2 times daily 60 tablet 0     VITAMIN D, CHOLECALCIFEROL, PO Take 2,000 Units by mouth daily       doxazosin (CARDURA) 4 MG tablet Take 1 tablet (4 mg) by mouth At Bedtime 30 tablet 11     multivitamin, therapeutic with minerals (MULTI-VITAMIN) TABS tablet Take 1 tablet by mouth daily       senna-docusate (SENOKOT-S;PERICOLACE) 8.6-50 MG per tablet Take 1 tablet by mouth 2 times daily as needed for constipation (Patient not taking: Reported on 6/13/2018) 100 tablet 1                Data:   All laboratory data reviewed  No results found for this or any previous visit (from the past 24 hour(s)).    All laboratory data reviewed  No results found for: CHOL  No results found for: HDL  No results found for: LDL  No results found for: TRIG  No results found for: CHOLHDLRATIO  No results found for: TSH  Last Basic Metabolic Panel:  Lab Results   Component Value Date     06/13/2018      Lab Results   Component Value Date    POTASSIUM 4.3 06/13/2018     Lab Results   Component Value Date    CHLORIDE 102 06/13/2018     Lab Results   Component Value Date    TAMARA 9.6 06/13/2018     Lab Results   Component Value Date    CO2 30 06/13/2018     Lab Results   Component Value Date    BUN 11 06/13/2018     Lab Results   Component Value Date    CR 1.04 06/13/2018     Lab Results   Component Value Date     06/13/2018     Lab Results   Component Value Date    WBC 6.9 02/18/2018     Lab Results   Component Value Date    RBC 3.28 02/18/2018     Lab Results   Component Value Date    HGB 10.0 02/18/2018     Lab Results   Component Value Date    HCT 29.9 02/18/2018     Lab Results   Component Value Date    MCV 91 02/18/2018      Lab Results   Component Value Date    MCH 30.5 02/18/2018     Lab Results   Component Value Date    MCHC 33.4 02/18/2018     Lab Results   Component Value Date    RDW 13.7 02/18/2018     Lab Results   Component Value Date     02/19/2018

## 2019-03-15 NOTE — PATIENT INSTRUCTIONS
Let's try cutting your METOPROLOL dose down in half to 25mg twice per day. It's short acting so you still have to take it twice per day about 12 hours apart.    Take your other medications in the same way and keep track of your blood pressure. Let me know if it creeps up above 130 consistently after decreasing the METOPROLOL.     If it does creep up, we might either increase the HYDROCHLOROTHIAZIDE to 25mg daily or increase the frequency of the LASIX again.    Sometime this year we will check the graft and aorta with a CT scan for a new baseline.    See you in about a year.

## 2019-08-27 DIAGNOSIS — Z98.890 S/P AORTIC DISSECTION REPAIR: ICD-10-CM

## 2019-08-27 DIAGNOSIS — I10 BENIGN ESSENTIAL HYPERTENSION: ICD-10-CM

## 2019-08-27 RX ORDER — FUROSEMIDE 20 MG
10 TABLET ORAL DAILY
Qty: 45 TABLET | Refills: 3 | Status: SHIPPED | OUTPATIENT
Start: 2019-08-27 | End: 2020-03-26 | Stop reason: ALTCHOICE

## 2019-10-01 ENCOUNTER — HEALTH MAINTENANCE LETTER (OUTPATIENT)
Age: 69
End: 2019-10-01

## 2019-12-15 ENCOUNTER — HEALTH MAINTENANCE LETTER (OUTPATIENT)
Age: 69
End: 2019-12-15

## 2020-03-26 ENCOUNTER — VIRTUAL VISIT (OUTPATIENT)
Dept: CARDIOLOGY | Facility: CLINIC | Age: 70
End: 2020-03-26
Payer: MEDICARE

## 2020-03-26 DIAGNOSIS — I10 BENIGN ESSENTIAL HYPERTENSION: ICD-10-CM

## 2020-03-26 DIAGNOSIS — Z98.890 S/P AORTIC DISSECTION REPAIR: Primary | ICD-10-CM

## 2020-03-26 PROCEDURE — 99441 ZZC PHYSICIAN TELEPHONE EVALUATION 5-10 MIN: CPT | Performed by: INTERNAL MEDICINE

## 2020-03-26 RX ORDER — HYDROCHLOROTHIAZIDE 25 MG/1
25 TABLET ORAL DAILY
Qty: 90 TABLET | Refills: 3 | Status: SHIPPED | OUTPATIENT
Start: 2020-03-26 | End: 2021-04-01

## 2020-03-26 NOTE — PROGRESS NOTES
"Vincenzo Moss is a 69 year old male who is being evaluated via a billable telephone visit.      The patient has been notified of following:     \"This telephone visit will be conducted via a call between you and your physician/provider. We have found that certain health care needs can be provided without the need for a physical exam.  This service lets us provide the care you need with a short phone conversation.  If a prescription is necessary we can send it directly to your pharmacy.  If lab work is needed we can place an order for that and you can then stop by our lab to have the test done at a later time.    If during the course of the call the physician/provider feels a telephone visit is not appropriate, you will not be charged for this service.\"     Vincenzo Moss complains of    Chief Complaint   Patient presents with     Annual Visit       I have reviewed and updated the patient's Past Medical History, Social History, Family History and Medication List.    ALLERGIES  Patient has no known allergies.      Weight- 224 lbs  BP- 121/74  HR- 64    STUART Harrington    Assessment/Plan:  The patient is a pleasant 69-year-old with history of ascending aortic aneurysm and dissection status post interposition graft.  He did not get his follow-up CTA last year.  He feels pretty well after we reduced his metoprolol.  He does have some headache that is bilateral, worse when he coughs.  This has been going on since about December.    At this point, patient appears to be relatively stable.  We will do a CTA of the head and neck and chest in a year.  The CTA of the head in case the dissection has newly involved the arch or has spontaneous dissection in carotids.  Will check CTA of the chest and abdomen to assess the aortic repair follow-up in clinic around that time.    In the interim, will increase his hydrochlorothiazide to 25 mg daily given that his headaches seem to be due to increased pressure and hopefully by " lowering blood pressure a little bit more we could improve his symptoms.  He will let us know if his headaches get worse.

## 2020-06-10 DIAGNOSIS — Z98.890 S/P AORTIC DISSECTION REPAIR: ICD-10-CM

## 2020-06-10 RX ORDER — METOPROLOL TARTRATE 25 MG/1
25 TABLET, FILM COATED ORAL 2 TIMES DAILY
Qty: 60 TABLET | Refills: 11 | Status: SHIPPED | OUTPATIENT
Start: 2020-06-10 | End: 2023-11-22

## 2020-06-25 ENCOUNTER — HOSPITAL ENCOUNTER (OUTPATIENT)
Dept: CT IMAGING | Facility: CLINIC | Age: 70
End: 2020-06-25
Attending: INTERNAL MEDICINE
Payer: MEDICARE

## 2020-06-25 DIAGNOSIS — Z98.890 S/P AORTIC DISSECTION REPAIR: ICD-10-CM

## 2020-06-25 DIAGNOSIS — I10 BENIGN ESSENTIAL HYPERTENSION: ICD-10-CM

## 2020-06-25 LAB
CREAT BLD-MCNC: 1 MG/DL (ref 0.66–1.25)
GFR SERPL CREATININE-BSD FRML MDRD: 74 ML/MIN/{1.73_M2}

## 2020-06-25 PROCEDURE — 25000125 ZZHC RX 250: Performed by: INTERNAL MEDICINE

## 2020-06-25 PROCEDURE — 82565 ASSAY OF CREATININE: CPT

## 2020-06-25 PROCEDURE — 70496 CT ANGIOGRAPHY HEAD: CPT

## 2020-06-25 PROCEDURE — 71275 CT ANGIOGRAPHY CHEST: CPT

## 2020-06-25 PROCEDURE — 25000128 H RX IP 250 OP 636: Performed by: INTERNAL MEDICINE

## 2020-06-25 RX ORDER — IOPAMIDOL 755 MG/ML
80 INJECTION, SOLUTION INTRAVASCULAR ONCE
Status: COMPLETED | OUTPATIENT
Start: 2020-06-25 | End: 2020-06-25

## 2020-06-25 RX ADMIN — IOPAMIDOL 80 ML: 755 INJECTION, SOLUTION INTRAVENOUS at 07:09

## 2020-06-25 RX ADMIN — SODIUM CHLORIDE 80 ML: 9 INJECTION, SOLUTION INTRAVENOUS at 07:09

## 2020-07-08 ENCOUNTER — CARE COORDINATION (OUTPATIENT)
Dept: CARDIOLOGY | Facility: CLINIC | Age: 70
End: 2020-07-08

## 2020-07-08 NOTE — PROGRESS NOTES
Call from patient regarding his CT scans that he had done on 6/25/20. Patient wondering about recommendations. Will route to Dr. Velaqzuez for further review.           JV Talbot July 8, 2020 11:24 AM

## 2021-01-15 ENCOUNTER — HEALTH MAINTENANCE LETTER (OUTPATIENT)
Age: 71
End: 2021-01-15

## 2021-04-01 DIAGNOSIS — Z98.890 S/P AORTIC DISSECTION REPAIR: ICD-10-CM

## 2021-04-01 DIAGNOSIS — I10 BENIGN ESSENTIAL HYPERTENSION: ICD-10-CM

## 2021-04-01 RX ORDER — HYDROCHLOROTHIAZIDE 25 MG/1
25 TABLET ORAL DAILY
Qty: 90 TABLET | Refills: 0 | Status: SHIPPED | OUTPATIENT
Start: 2021-04-01 | End: 2021-06-23

## 2021-04-02 ENCOUNTER — MYC MEDICAL ADVICE (OUTPATIENT)
Dept: CARDIOLOGY | Facility: CLINIC | Age: 71
End: 2021-04-02

## 2021-06-23 DIAGNOSIS — Z98.890 S/P AORTIC DISSECTION REPAIR: ICD-10-CM

## 2021-06-23 DIAGNOSIS — I10 BENIGN ESSENTIAL HYPERTENSION: ICD-10-CM

## 2021-06-23 RX ORDER — HYDROCHLOROTHIAZIDE 25 MG/1
25 TABLET ORAL DAILY
Qty: 90 TABLET | Refills: 0 | Status: SHIPPED | OUTPATIENT
Start: 2021-06-23 | End: 2021-09-27

## 2021-07-07 ENCOUNTER — OFFICE VISIT (OUTPATIENT)
Dept: CARDIOLOGY | Facility: CLINIC | Age: 71
End: 2021-07-07
Payer: MEDICARE

## 2021-07-07 VITALS
HEIGHT: 70 IN | WEIGHT: 234.3 LBS | SYSTOLIC BLOOD PRESSURE: 143 MMHG | BODY MASS INDEX: 33.54 KG/M2 | OXYGEN SATURATION: 100 % | DIASTOLIC BLOOD PRESSURE: 76 MMHG | HEART RATE: 56 BPM

## 2021-07-07 DIAGNOSIS — I10 BENIGN ESSENTIAL HYPERTENSION: ICD-10-CM

## 2021-07-07 DIAGNOSIS — Z98.890 S/P AORTIC DISSECTION REPAIR: Primary | ICD-10-CM

## 2021-07-07 PROCEDURE — 99214 OFFICE O/P EST MOD 30 MIN: CPT | Performed by: INTERNAL MEDICINE

## 2021-07-07 ASSESSMENT — MIFFLIN-ST. JEOR: SCORE: 1824.03

## 2021-07-07 NOTE — LETTER
7/7/2021    Michael Mcbride MD  [facility] [address]  [facility] [address]    RE: Vincenzo Moss       Dear Colleague,    I had the pleasure of seeing Vincenzo Moss in the [facility].    Cardiology Progress Note          Assessment and Plan:       1. Borderline hypertension    Continue current medications.  Patient will try adding 5 to 10 minutes of aerobic exercise daily and message me in 9 months and let me know what his weights and blood pressures are running.  Diet is under good control.      2. Ascending aortic aneurysm repair    Stable on CT 6/2020.  Continue blood pressure control.    Follow-up in 1 year.    30 minutes was spent with the patient, precharting and reviewing tests as well as post visit charting all done today..    This note was transcribed using electronic voice recognition software and there may be typographical errors present.                    Interval History:     The patient is a very pleasant 71 year old whom I have been following for valve sparing aortic repair, we reviewed the CT from 6/2020 and he has bovine arch and stable sinuses.  Blood pressure is borderline controlled.    Patient has gained some weight in the past year.                     Review of Systems:     Review of Systems:  Skin:  Negative     Eyes:  Positive for glasses  ENT:  Positive for tinnitus  Respiratory:  Negative    Cardiovascular:  Negative for;palpitations;chest pain;syncope or near-syncope;exercise intolerance;fatigue;lightheadedness;dizziness Positive for;edema(chest discomfort near sternum)  Gastroenterology: Negative    Genitourinary:  Negative    Musculoskeletal:  Positive for arthritis  Neurologic:  Positive for numbness or tingling of hands(numbness in neck and down right arm)  Psychiatric:  Negative    Heme/Lymph/Imm:  Positive for hay fever;allergies  Endocrine:  Negative                Physical Exam:     Vitals: BP (!)  "143/76   Pulse 56   Ht 1.778 m (5' 10\")   Wt 106.3 kg (234 lb 4.8 oz)   SpO2 100%   BMI 33.62 kg/m    Constitutional:  cooperative, alert and oriented, well developed, well nourished, in no acute distress        Skin:  warm and dry to the touch, no apparent skin lesions or masses noted surgical scars well-healed      Head:  normocephalic, no masses or lesions        Eyes:  pupils equal and round, conjunctivae and lids unremarkable, sclera white, no xanthalasma, EOMS intact, no nystagmus        Chest:  normal breath sounds, clear to auscultation, normal A-P diameter, normal symmetry, normal respiratory excursion, no use of accessory muscles        Cardiac: regular rhythm       RUSB;grade 1;early systolic murmur          Extremities and Back:  no deformities, clubbing, cyanosis, erythema observed;no edema        Neurological:  no gross motor deficits;affect appropriate                 Medications:     Current Outpatient Medications   Medication Sig Dispense Refill     aspirin 81 MG chewable tablet Take 1 tablet (81 mg) by mouth daily 30 tablet 3     atorvastatin (LIPITOR) 10 MG tablet Take 1 tablet (10 mg) by mouth daily (Patient taking differently: Take 20 mg by mouth daily ) 30 tablet 3     Coenzyme Q10 (COQ-10) 10 MG CAPS Take 100 mg by mouth daily       hydrochlorothiazide (HYDRODIURIL) 25 MG tablet Take 1 tablet (25 mg) by mouth daily 90 tablet 0     losartan (COZAAR) 25 MG tablet Take 2 tablets (50 mg) by mouth 2 times daily       metoprolol tartrate (LOPRESSOR) 25 MG tablet Take 1 tablet (25 mg) by mouth 2 times daily 60 tablet 11     VITAMIN D, CHOLECALCIFEROL, PO Take 2,000 Units by mouth daily                  Data:   All laboratory data reviewed  No results found for this or any previous visit (from the past 24 hour(s)).    All laboratory data reviewed  No results found for: CHOL  No results found for: HDL  No results found for: LDL  No results found for: TRIG  No results found for: CHOLHDLRATIO  No " results found for: TSH  Last Basic Metabolic Panel:  Lab Results   Component Value Date     06/13/2018      Lab Results   Component Value Date    POTASSIUM 4.3 06/13/2018     Lab Results   Component Value Date    CHLORIDE 102 06/13/2018     Lab Results   Component Value Date    TAMARA 9.6 06/13/2018     Lab Results   Component Value Date    CO2 30 06/13/2018     Lab Results   Component Value Date    BUN 11 06/13/2018     Lab Results   Component Value Date    CR 1.04 06/13/2018     Lab Results   Component Value Date     06/13/2018     Lab Results   Component Value Date    WBC 6.9 02/18/2018     Lab Results   Component Value Date    RBC 3.28 02/18/2018     Lab Results   Component Value Date    HGB 10.0 02/18/2018     Lab Results   Component Value Date    HCT 29.9 02/18/2018     Lab Results   Component Value Date    MCV 91 02/18/2018     Lab Results   Component Value Date    MCH 30.5 02/18/2018     Lab Results   Component Value Date    MCHC 33.4 02/18/2018     Lab Results   Component Value Date    RDW 13.7 02/18/2018     Lab Results   Component Value Date     02/19/2018                   Thank you for allowing me to participate in the care of your patient.      Sincerely,     Can Velazquez MD     [facility]  cc:   No referring provider defined for this encounter.

## 2021-07-07 NOTE — PATIENT INSTRUCTIONS
For three months, do 5 minutes of stairs every day. Set a timer and count how many flights you can do.    Then after three months, increase to 7.5 minutes.    Then three months after that, increase to 10 minutes and keep track of your flights.    Send me a [employer] message in 9 months and let me know how the weight is going.

## 2021-07-07 NOTE — PROGRESS NOTES
"Cardiology Progress Note          Assessment and Plan:       1. Borderline hypertension    Continue current medications.  Patient will try adding 5 to 10 minutes of aerobic exercise daily and message me in 9 months and let me know what his weights and blood pressures are running.  Diet is under good control.      2. Ascending aortic aneurysm repair    Stable on CT 6/2020.  Continue blood pressure control.    Follow-up in 1 year.    30 minutes was spent with the patient, precharting and reviewing tests as well as post visit charting all done today..    This note was transcribed using electronic voice recognition software and there may be typographical errors present.                    Interval History:     The patient is a very pleasant 71 year old whom I have been following for valve sparing aortic repair, we reviewed the CT from 6/2020 and he has bovine arch and stable sinuses.  Blood pressure is borderline controlled.    Patient has gained some weight in the past year.                     Review of Systems:     Review of Systems:  Skin:  Negative     Eyes:  Positive for glasses  ENT:  Positive for tinnitus  Respiratory:  Negative    Cardiovascular:  Negative for;palpitations;chest pain;syncope or near-syncope;exercise intolerance;fatigue;lightheadedness;dizziness Positive for;edema(chest discomfort near sternum)  Gastroenterology: Negative    Genitourinary:  Negative    Musculoskeletal:  Positive for arthritis  Neurologic:  Positive for numbness or tingling of hands(numbness in neck and down right arm)  Psychiatric:  Negative    Heme/Lymph/Imm:  Positive for hay fever;allergies  Endocrine:  Negative                Physical Exam:     Vitals: BP (!) 143/76   Pulse 56   Ht 1.778 m (5' 10\")   Wt 106.3 kg (234 lb 4.8 oz)   SpO2 100%   BMI 33.62 kg/m    Constitutional:  cooperative, alert and oriented, well developed, well nourished, in no acute distress        Skin:  warm and dry to the touch, no apparent skin " lesions or masses noted surgical scars well-healed      Head:  normocephalic, no masses or lesions        Eyes:  pupils equal and round, conjunctivae and lids unremarkable, sclera white, no xanthalasma, EOMS intact, no nystagmus        Chest:  normal breath sounds, clear to auscultation, normal A-P diameter, normal symmetry, normal respiratory excursion, no use of accessory muscles        Cardiac: regular rhythm       RUSB;grade 1;early systolic murmur          Extremities and Back:  no deformities, clubbing, cyanosis, erythema observed;no edema        Neurological:  no gross motor deficits;affect appropriate                 Medications:     Current Outpatient Medications   Medication Sig Dispense Refill     aspirin 81 MG chewable tablet Take 1 tablet (81 mg) by mouth daily 30 tablet 3     atorvastatin (LIPITOR) 10 MG tablet Take 1 tablet (10 mg) by mouth daily (Patient taking differently: Take 20 mg by mouth daily ) 30 tablet 3     Coenzyme Q10 (COQ-10) 10 MG CAPS Take 100 mg by mouth daily       hydrochlorothiazide (HYDRODIURIL) 25 MG tablet Take 1 tablet (25 mg) by mouth daily 90 tablet 0     losartan (COZAAR) 25 MG tablet Take 2 tablets (50 mg) by mouth 2 times daily       metoprolol tartrate (LOPRESSOR) 25 MG tablet Take 1 tablet (25 mg) by mouth 2 times daily 60 tablet 11     VITAMIN D, CHOLECALCIFEROL, PO Take 2,000 Units by mouth daily                  Data:   All laboratory data reviewed  No results found for this or any previous visit (from the past 24 hour(s)).    All laboratory data reviewed  No results found for: CHOL  No results found for: HDL  No results found for: LDL  No results found for: TRIG  No results found for: CHOLHDLRATIO  No results found for: TSH  Last Basic Metabolic Panel:  Lab Results   Component Value Date     06/13/2018      Lab Results   Component Value Date    POTASSIUM 4.3 06/13/2018     Lab Results   Component Value Date    CHLORIDE 102 06/13/2018     Lab Results   Component  Value Date    TAMARA 9.6 06/13/2018     Lab Results   Component Value Date    CO2 30 06/13/2018     Lab Results   Component Value Date    BUN 11 06/13/2018     Lab Results   Component Value Date    CR 1.04 06/13/2018     Lab Results   Component Value Date     06/13/2018     Lab Results   Component Value Date    WBC 6.9 02/18/2018     Lab Results   Component Value Date    RBC 3.28 02/18/2018     Lab Results   Component Value Date    HGB 10.0 02/18/2018     Lab Results   Component Value Date    HCT 29.9 02/18/2018     Lab Results   Component Value Date    MCV 91 02/18/2018     Lab Results   Component Value Date    MCH 30.5 02/18/2018     Lab Results   Component Value Date    MCHC 33.4 02/18/2018     Lab Results   Component Value Date    RDW 13.7 02/18/2018     Lab Results   Component Value Date     02/19/2018

## 2021-09-04 ENCOUNTER — HEALTH MAINTENANCE LETTER (OUTPATIENT)
Age: 71
End: 2021-09-04

## 2021-09-27 DIAGNOSIS — Z98.890 S/P AORTIC DISSECTION REPAIR: ICD-10-CM

## 2021-09-27 DIAGNOSIS — I10 BENIGN ESSENTIAL HYPERTENSION: ICD-10-CM

## 2021-09-27 RX ORDER — HYDROCHLOROTHIAZIDE 25 MG/1
25 TABLET ORAL DAILY
Qty: 90 TABLET | Refills: 3 | Status: SHIPPED | OUTPATIENT
Start: 2021-09-27 | End: 2023-11-22

## 2022-02-19 ENCOUNTER — HEALTH MAINTENANCE LETTER (OUTPATIENT)
Age: 72
End: 2022-02-19

## 2022-10-22 ENCOUNTER — HEALTH MAINTENANCE LETTER (OUTPATIENT)
Age: 72
End: 2022-10-22

## 2022-12-07 ENCOUNTER — OFFICE VISIT (OUTPATIENT)
Dept: CARDIOLOGY | Facility: CLINIC | Age: 72
End: 2022-12-07
Payer: MEDICARE

## 2022-12-07 VITALS
HEIGHT: 70 IN | WEIGHT: 224 LBS | BODY MASS INDEX: 32.07 KG/M2 | DIASTOLIC BLOOD PRESSURE: 68 MMHG | HEART RATE: 62 BPM | SYSTOLIC BLOOD PRESSURE: 122 MMHG

## 2022-12-07 DIAGNOSIS — I10 BENIGN ESSENTIAL HYPERTENSION: ICD-10-CM

## 2022-12-07 DIAGNOSIS — Z98.890 S/P AORTIC DISSECTION REPAIR: ICD-10-CM

## 2022-12-07 PROCEDURE — 99214 OFFICE O/P EST MOD 30 MIN: CPT | Performed by: INTERNAL MEDICINE

## 2022-12-07 RX ORDER — BACLOFEN 10 MG/1
10 TABLET ORAL 3 TIMES DAILY
COMMUNITY
End: 2023-11-22

## 2022-12-07 RX ORDER — LOSARTAN POTASSIUM 100 MG/1
100 TABLET ORAL DAILY
COMMUNITY
End: 2023-11-22

## 2022-12-07 NOTE — LETTER
"12/7/2022    Michael Mcbride MD  [facility] [address]  [facility] [address]    RE: Vincenzo Moss       Dear Colleague,     I had the pleasure of seeing Vincenzo Moss in the [facility].  Cardiology Progress Note          Assessment and Plan:       1. Ascending aortic aneurysm and dissection status post repair 2018, valve sparing operation    Recheck echocardiogram to assess aortic valve and tube graft    We will follow-up on outside CT results when available      2. Hypertension, good control    Continue current medications.    30 minutes was spent with the patient, precharting and reviewing tests as well as post visit charting all done today..    This note was transcribed using electronic voice recognition software and there may be typographical errors present.                    Interval History:     The patient is a very pleasant 72 year old whom I have been following for ascending aortic dissection status post repair 2018, no arch or descending dissection.  He has had CT by urology that is not in the system.  He is also looking for a new primary care physician out of the [place] system and in our system if possible.    His blood pressure is under good control.  He has cut down on beer which has helped him lose weight.                     Review of Systems:     Review of Systems:  Skin:  Negative     Eyes:  Negative    ENT:  Positive for hearing loss  Respiratory:  Negative    Cardiovascular:    edema;Positive for  Gastroenterology: Negative    Genitourinary:  not assessed    Musculoskeletal:  Positive for neck pain  Neurologic:  Negative    Psychiatric:  Negative    Heme/Lymph/Imm:  Negative    Endocrine:  Negative                Physical Exam:     Vitals: /68   Pulse 62   Ht 1.778 m (5' 10\")   Wt 101.6 kg (224 lb)   BMI 32.14 kg/m    Constitutional:  cooperative, alert and oriented, well developed, well nourished, in no acute distress        Skin:  warm and " dry to the touch, no apparent skin lesions or masses noted surgical scars well-healed      Head:  normocephalic, no masses or lesions        Eyes:  pupils equal and round, conjunctivae and lids unremarkable, sclera white, no xanthalasma, EOMS intact, no nystagmus        Chest:  normal breath sounds, clear to auscultation, normal A-P diameter, normal symmetry, normal respiratory excursion, no use of accessory muscles        Cardiac: regular rhythm       RUSB;grade 1;early systolic murmur          Extremities and Back:  no deformities, clubbing, cyanosis, erythema observed;no edema        Neurological:  no gross motor deficits;affect appropriate             Medications:     Current Outpatient Medications   Medication Sig Dispense Refill     aspirin 81 MG chewable tablet Take 1 tablet (81 mg) by mouth daily 30 tablet 3     atorvastatin (LIPITOR) 10 MG tablet Take 1 tablet (10 mg) by mouth daily (Patient taking differently: Take 20 mg by mouth daily) 30 tablet 3     baclofen (LIORESAL) 10 MG tablet Take 10 mg by mouth 3 times daily       Coenzyme Q10 (COQ-10) 10 MG CAPS Take 100 mg by mouth daily       hydrochlorothiazide (HYDRODIURIL) 25 MG tablet Take 1 tablet (25 mg) by mouth daily 90 tablet 3     losartan (COZAAR) 100 MG tablet Take 100 mg by mouth daily       metoprolol tartrate (LOPRESSOR) 25 MG tablet Take 1 tablet (25 mg) by mouth 2 times daily 60 tablet 11     VITAMIN D, CHOLECALCIFEROL, PO Take 2,000 Units by mouth daily            Data:   All laboratory data reviewed  No results found for this or any previous visit (from the past 24 hour(s)).    All laboratory data reviewed  No results found for: CHOL  No results found for: HDL  No results found for: LDL  No results found for: TRIG  No results found for: CHOLHDLRATIO  No results found for: TSH  Last Basic Metabolic Panel:  Lab Results   Component Value Date     06/13/2018      Lab Results   Component Value Date    POTASSIUM 4.3 06/13/2018     Lab  Results   Component Value Date    CHLORIDE 102 06/13/2018     Lab Results   Component Value Date    TAMARA 9.6 06/13/2018     Lab Results   Component Value Date    CO2 30 06/13/2018     Lab Results   Component Value Date    BUN 11 06/13/2018     Lab Results   Component Value Date    CR 1.04 06/13/2018     Lab Results   Component Value Date     06/13/2018     Lab Results   Component Value Date    WBC 6.9 02/18/2018     Lab Results   Component Value Date    RBC 3.28 02/18/2018     Lab Results   Component Value Date    HGB 10.0 02/18/2018     Lab Results   Component Value Date    HCT 29.9 02/18/2018     Lab Results   Component Value Date    MCV 91 02/18/2018     Lab Results   Component Value Date    MCH 30.5 02/18/2018     Lab Results   Component Value Date    MCHC 33.4 02/18/2018     Lab Results   Component Value Date    RDW 13.7 02/18/2018     Lab Results   Component Value Date     02/19/2018     Thank you for allowing me to participate in the care of your patient.    Sincerely,     Can Velazquez MD     [facility]

## 2022-12-07 NOTE — PROGRESS NOTES
"Cardiology Progress Note          Assessment and Plan:       1. Ascending aortic aneurysm and dissection status post repair 2018, valve sparing operation    Recheck echocardiogram to assess aortic valve and tube graft    We will follow-up on outside CT results when available      2. Hypertension, good control    Continue current medications.    30 minutes was spent with the patient, precharting and reviewing tests as well as post visit charting all done today..    This note was transcribed using electronic voice recognition software and there may be typographical errors present.                    Interval History:     The patient is a very pleasant 72 year old whom I have been following for ascending aortic dissection status post repair 2018, no arch or descending dissection.  He has had CT by urology that is not in the system.  He is also looking for a new primary care physician out of the [place] system and in our system if possible.    His blood pressure is under good control.  He has cut down on beer which has helped him lose weight.                     Review of Systems:     Review of Systems:  Skin:  Negative     Eyes:  Negative    ENT:  Positive for hearing loss  Respiratory:  Negative    Cardiovascular:    edema;Positive for  Gastroenterology: Negative    Genitourinary:  not assessed    Musculoskeletal:  Positive for neck pain  Neurologic:  Negative    Psychiatric:  Negative    Heme/Lymph/Imm:  Negative    Endocrine:  Negative                Physical Exam:     Vitals: /68   Pulse 62   Ht 1.778 m (5' 10\")   Wt 101.6 kg (224 lb)   BMI 32.14 kg/m    Constitutional:  cooperative, alert and oriented, well developed, well nourished, in no acute distress        Skin:  warm and dry to the touch, no apparent skin lesions or masses noted surgical scars well-healed      Head:  normocephalic, no masses or lesions        Eyes:  pupils equal and round, conjunctivae and lids unremarkable, sclera white, no " xanthalasma, EOMS intact, no nystagmus        Chest:  normal breath sounds, clear to auscultation, normal A-P diameter, normal symmetry, normal respiratory excursion, no use of accessory muscles        Cardiac: regular rhythm       RUSB;grade 1;early systolic murmur          Extremities and Back:  no deformities, clubbing, cyanosis, erythema observed;no edema        Neurological:  no gross motor deficits;affect appropriate                 Medications:     Current Outpatient Medications   Medication Sig Dispense Refill     aspirin 81 MG chewable tablet Take 1 tablet (81 mg) by mouth daily 30 tablet 3     atorvastatin (LIPITOR) 10 MG tablet Take 1 tablet (10 mg) by mouth daily (Patient taking differently: Take 20 mg by mouth daily) 30 tablet 3     baclofen (LIORESAL) 10 MG tablet Take 10 mg by mouth 3 times daily       Coenzyme Q10 (COQ-10) 10 MG CAPS Take 100 mg by mouth daily       hydrochlorothiazide (HYDRODIURIL) 25 MG tablet Take 1 tablet (25 mg) by mouth daily 90 tablet 3     losartan (COZAAR) 100 MG tablet Take 100 mg by mouth daily       metoprolol tartrate (LOPRESSOR) 25 MG tablet Take 1 tablet (25 mg) by mouth 2 times daily 60 tablet 11     VITAMIN D, CHOLECALCIFEROL, PO Take 2,000 Units by mouth daily                  Data:   All laboratory data reviewed  No results found for this or any previous visit (from the past 24 hour(s)).    All laboratory data reviewed  No results found for: CHOL  No results found for: HDL  No results found for: LDL  No results found for: TRIG  No results found for: CHOLHDLRATIO  No results found for: TSH  Last Basic Metabolic Panel:  Lab Results   Component Value Date     06/13/2018      Lab Results   Component Value Date    POTASSIUM 4.3 06/13/2018     Lab Results   Component Value Date    CHLORIDE 102 06/13/2018     Lab Results   Component Value Date    TAMARA 9.6 06/13/2018     Lab Results   Component Value Date    CO2 30 06/13/2018     Lab Results   Component Value Date     BUN 11 06/13/2018     Lab Results   Component Value Date    CR 1.04 06/13/2018     Lab Results   Component Value Date     06/13/2018     Lab Results   Component Value Date    WBC 6.9 02/18/2018     Lab Results   Component Value Date    RBC 3.28 02/18/2018     Lab Results   Component Value Date    HGB 10.0 02/18/2018     Lab Results   Component Value Date    HCT 29.9 02/18/2018     Lab Results   Component Value Date    MCV 91 02/18/2018     Lab Results   Component Value Date    MCH 30.5 02/18/2018     Lab Results   Component Value Date    MCHC 33.4 02/18/2018     Lab Results   Component Value Date    RDW 13.7 02/18/2018     Lab Results   Component Value Date     02/19/2018

## 2022-12-20 ENCOUNTER — HOSPITAL ENCOUNTER (OUTPATIENT)
Dept: CARDIOLOGY | Facility: CLINIC | Age: 72
Discharge: HOME | End: 2022-12-20
Attending: INTERNAL MEDICINE | Admitting: INTERNAL MEDICINE
Payer: MEDICARE

## 2022-12-20 DIAGNOSIS — Z98.890 S/P AORTIC DISSECTION REPAIR: ICD-10-CM

## 2022-12-20 DIAGNOSIS — I10 BENIGN ESSENTIAL HYPERTENSION: ICD-10-CM

## 2022-12-20 LAB — LVEF ECHO: NORMAL

## 2022-12-20 PROCEDURE — 93306 TTE W/DOPPLER COMPLETE: CPT

## 2022-12-20 PROCEDURE — 93306 TTE W/DOPPLER COMPLETE: CPT | Mod: 26 | Performed by: INTERNAL MEDICINE

## 2023-11-04 ENCOUNTER — HEALTH MAINTENANCE LETTER (OUTPATIENT)
Age: 73
End: 2023-11-04

## 2023-11-15 ASSESSMENT — ENCOUNTER SYMPTOMS
NERVOUS/ANXIOUS: 1
ARTHRALGIAS: 1
PALPITATIONS: 0
PARESTHESIAS: 0
MYALGIAS: 1
CONSTIPATION: 0
ABDOMINAL PAIN: 0
EYE PAIN: 0
SHORTNESS OF BREATH: 1
FEVER: 0
WEAKNESS: 0
HEMATURIA: 0
DYSURIA: 0
NAUSEA: 0
JOINT SWELLING: 1
CHILLS: 0
HEARTBURN: 0
DIARRHEA: 0
COUGH: 0
FREQUENCY: 1
HEMATOCHEZIA: 0
SORE THROAT: 0
DIZZINESS: 0
HEADACHES: 0

## 2023-11-15 ASSESSMENT — ACTIVITIES OF DAILY LIVING (ADL): CURRENT_FUNCTION: NO ASSISTANCE NEEDED

## 2023-11-22 ENCOUNTER — OFFICE VISIT (OUTPATIENT)
Dept: FAMILY MEDICINE | Facility: CLINIC | Age: 73
End: 2023-11-22
Payer: MEDICARE

## 2023-11-22 VITALS
SYSTOLIC BLOOD PRESSURE: 134 MMHG | BODY MASS INDEX: 33.33 KG/M2 | HEART RATE: 64 BPM | RESPIRATION RATE: 18 BRPM | DIASTOLIC BLOOD PRESSURE: 78 MMHG | HEIGHT: 70 IN | TEMPERATURE: 97.3 F | WEIGHT: 232.8 LBS | OXYGEN SATURATION: 99 %

## 2023-11-22 DIAGNOSIS — I10 BENIGN ESSENTIAL HYPERTENSION: ICD-10-CM

## 2023-11-22 DIAGNOSIS — Z98.890 S/P AORTIC DISSECTION REPAIR: ICD-10-CM

## 2023-11-22 DIAGNOSIS — I10 ESSENTIAL HYPERTENSION, BENIGN: ICD-10-CM

## 2023-11-22 DIAGNOSIS — Z00.00 ROUTINE GENERAL MEDICAL EXAMINATION AT A HEALTH CARE FACILITY: Primary | ICD-10-CM

## 2023-11-22 DIAGNOSIS — Z11.59 NEED FOR HEPATITIS C SCREENING TEST: ICD-10-CM

## 2023-11-22 DIAGNOSIS — R10.11 RUQ ABDOMINAL PAIN: ICD-10-CM

## 2023-11-22 DIAGNOSIS — M79.662 PAIN OF LEFT CALF: ICD-10-CM

## 2023-11-22 DIAGNOSIS — Z12.11 SCREEN FOR COLON CANCER: ICD-10-CM

## 2023-11-22 PROBLEM — E66.9 OBESITY: Status: ACTIVE | Noted: 2018-12-25

## 2023-11-22 LAB
ERYTHROCYTE [DISTWIDTH] IN BLOOD BY AUTOMATED COUNT: 13.3 % (ref 10–15)
HBA1C MFR BLD: 5.3 % (ref 0–5.6)
HCT VFR BLD AUTO: 43.5 % (ref 40–53)
HGB BLD-MCNC: 14.1 G/DL (ref 13.3–17.7)
MCH RBC QN AUTO: 30.1 PG (ref 26.5–33)
MCHC RBC AUTO-ENTMCNC: 32.4 G/DL (ref 31.5–36.5)
MCV RBC AUTO: 93 FL (ref 78–100)
PLATELET # BLD AUTO: 122 10E3/UL (ref 150–450)
RBC # BLD AUTO: 4.69 10E6/UL (ref 4.4–5.9)
WBC # BLD AUTO: 5.5 10E3/UL (ref 4–11)

## 2023-11-22 PROCEDURE — 85027 COMPLETE CBC AUTOMATED: CPT | Performed by: INTERNAL MEDICINE

## 2023-11-22 PROCEDURE — G0103 PSA SCREENING: HCPCS | Performed by: INTERNAL MEDICINE

## 2023-11-22 PROCEDURE — 36415 COLL VENOUS BLD VENIPUNCTURE: CPT | Performed by: INTERNAL MEDICINE

## 2023-11-22 PROCEDURE — 80061 LIPID PANEL: CPT | Performed by: INTERNAL MEDICINE

## 2023-11-22 PROCEDURE — 99204 OFFICE O/P NEW MOD 45 MIN: CPT | Mod: 25 | Performed by: INTERNAL MEDICINE

## 2023-11-22 PROCEDURE — G0439 PPPS, SUBSEQ VISIT: HCPCS | Performed by: INTERNAL MEDICINE

## 2023-11-22 PROCEDURE — 80053 COMPREHEN METABOLIC PANEL: CPT | Performed by: INTERNAL MEDICINE

## 2023-11-22 PROCEDURE — 83036 HEMOGLOBIN GLYCOSYLATED A1C: CPT | Mod: GZ | Performed by: INTERNAL MEDICINE

## 2023-11-22 RX ORDER — HYDROCHLOROTHIAZIDE 25 MG/1
25 TABLET ORAL DAILY
Qty: 90 TABLET | Refills: 3 | Status: SHIPPED | OUTPATIENT
Start: 2023-11-22

## 2023-11-22 RX ORDER — METOPROLOL TARTRATE 25 MG/1
25 TABLET, FILM COATED ORAL 2 TIMES DAILY
Qty: 60 TABLET | Refills: 11 | Status: SHIPPED | OUTPATIENT
Start: 2023-11-22

## 2023-11-22 RX ORDER — LOSARTAN POTASSIUM 100 MG/1
100 TABLET ORAL DAILY
Qty: 90 TABLET | Refills: 3 | Status: SHIPPED | OUTPATIENT
Start: 2023-11-22

## 2023-11-22 RX ORDER — ATORVASTATIN CALCIUM 20 MG/1
20 TABLET, FILM COATED ORAL DAILY
Qty: 90 TABLET | Refills: 3 | Status: SHIPPED | OUTPATIENT
Start: 2023-11-22

## 2023-11-22 ASSESSMENT — ENCOUNTER SYMPTOMS
SHORTNESS OF BREATH: 1
JOINT SWELLING: 1
FEVER: 0
CONSTIPATION: 0
COUGH: 0
CHILLS: 0
HEADACHES: 0
FREQUENCY: 1
ARTHRALGIAS: 1
SORE THROAT: 0
NAUSEA: 0
EYE PAIN: 0
PARESTHESIAS: 0
MYALGIAS: 1
DIZZINESS: 0
HEARTBURN: 0
DYSURIA: 0
HEMATURIA: 0
WEAKNESS: 0
PALPITATIONS: 0
DIARRHEA: 0
ABDOMINAL PAIN: 0
HEMATOCHEZIA: 0
NERVOUS/ANXIOUS: 1

## 2023-11-22 ASSESSMENT — ACTIVITIES OF DAILY LIVING (ADL): CURRENT_FUNCTION: NO ASSISTANCE NEEDED

## 2023-11-22 ASSESSMENT — PAIN SCALES - GENERAL: PAINLEVEL: NO PAIN (0)

## 2023-11-22 NOTE — PATIENT INSTRUCTIONS
(Z00.00) Routine general medical examination at a health care facility  (primary encounter diagnosis)  Comment: For routine exam, we will draw labs as ordered, cholesterol, diabetes mellitus check, liver function, renal function, PSA and update colonoscopy.  We will also update vaccination history.  Plan: Prostate Specific Antigen Screen, Lipid panel         reflex to direct LDL Fasting, Comprehensive         metabolic panel, CBC with platelets            (Z11.59) Need for hepatitis C screening test  Comment: Check Hepatitis C   Plan:     (I10) Essential hypertension, benign  Comment: Check hemoglobin A1c as well.  Continue blood pressure medications   Plan: Hemoglobin A1c            (R10.11) RUQ abdominal pain  Comment: REcommend abdominal ultrasound [facility] phone #[phone]   Plan: US Abdomen Limited            (M79.662) Pain of left calf  Comment: Recommend ultrasound to rule out deep vein thrombosis [facility] phone #[phone]   Plan: US Lower Extremity Venous Duplex Left

## 2023-11-22 NOTE — RESULT ENCOUNTER NOTE
Huy Loya,    I have had the opportunity to review your recent results and an interpretation is as follows:  Your complete blood counts returned with stable white blood cell count and hemoglobin and notable low platelet count which we can continue to monitor  Your A1c also returned stable    Sincerely,  Stephon Dai MD

## 2023-11-22 NOTE — Clinical Note
Please abstract the following data from this visit with this patient into the appropriate field in Epic:  Tests that can be patient reported without a hard copy:   Other Tests found in the patient's chart through Chart Review/Care Everywhere:  Colonoscopy done by this group [facility] on this date: 02/02/2015 - see care everywhere - 10 year follow up   Note to [employer]st[employer]: If this section is blank, no results were found via Chart Review/Care Everywhere.

## 2023-11-23 LAB
ALBUMIN SERPL BCG-MCNC: 4.5 G/DL (ref 3.5–5.2)
ALP SERPL-CCNC: 68 U/L (ref 40–150)
ALT SERPL W P-5'-P-CCNC: 31 U/L (ref 0–70)
ANION GAP SERPL CALCULATED.3IONS-SCNC: 7 MMOL/L (ref 7–15)
AST SERPL W P-5'-P-CCNC: 37 U/L (ref 0–45)
BILIRUB SERPL-MCNC: 0.9 MG/DL
BUN SERPL-MCNC: 13.1 MG/DL (ref 8–23)
CALCIUM SERPL-MCNC: 9.5 MG/DL (ref 8.8–10.2)
CHLORIDE SERPL-SCNC: 103 MMOL/L (ref 98–107)
CHOLEST SERPL-MCNC: 120 MG/DL
CREAT SERPL-MCNC: 0.99 MG/DL (ref 0.67–1.17)
DEPRECATED HCO3 PLAS-SCNC: 29 MMOL/L (ref 22–29)
EGFRCR SERPLBLD CKD-EPI 2021: 80 ML/MIN/1.73M2
GLUCOSE SERPL-MCNC: 93 MG/DL (ref 70–99)
HDLC SERPL-MCNC: 50 MG/DL
LDLC SERPL CALC-MCNC: 58 MG/DL
NONHDLC SERPL-MCNC: 70 MG/DL
POTASSIUM SERPL-SCNC: 4 MMOL/L (ref 3.4–5.3)
PROT SERPL-MCNC: 7.3 G/DL (ref 6.4–8.3)
PSA SERPL DL<=0.01 NG/ML-MCNC: 1.84 NG/ML (ref 0–6.5)
SODIUM SERPL-SCNC: 139 MMOL/L (ref 135–145)
TRIGL SERPL-MCNC: 60 MG/DL

## 2023-11-24 NOTE — RESULT ENCOUNTER NOTE
Huy Loya,    I had the opportunity to review your recent labs and a summary of your labs reads as follows:    Your comprehensive metabolic panel showed normal renal function, normal liver function, and normal fasting blood glucose indicating no evidence of diabetes mellitus.  Your fasting lipid panel show  - normal HDL (good) cholesterol -as your goal is greater than 40  - low LDL (bad) cholesterol as your goal is less than 130  - normal triglyceride levels  Your PSA level is also stable indicating no evidence of prostate cancer      Sincerely,  Stephon Dai MD

## 2023-11-30 ENCOUNTER — HOSPITAL ENCOUNTER (OUTPATIENT)
Dept: ULTRASOUND IMAGING | Facility: CLINIC | Age: 73
Discharge: HOME | End: 2023-11-30
Attending: INTERNAL MEDICINE
Payer: MEDICARE

## 2023-11-30 DIAGNOSIS — M79.662 PAIN OF LEFT CALF: ICD-10-CM

## 2023-11-30 DIAGNOSIS — R10.11 RUQ ABDOMINAL PAIN: ICD-10-CM

## 2023-11-30 PROCEDURE — 93971 EXTREMITY STUDY: CPT | Mod: LT

## 2023-11-30 PROCEDURE — 76705 ECHO EXAM OF ABDOMEN: CPT

## 2023-11-30 NOTE — RESULT ENCOUNTER NOTE
Huy Loya,    I have had the opportunity to review your recent results and an interpretation is as follows:  Your follow-up ultrasound shows an unremarkable right upper quadrant ultrasound.  No evidence of any liver or gallbladder concerns.      Sincerely,  Stephon Dai MD

## 2023-11-30 NOTE — RESULT ENCOUNTER NOTE
Huy Loya,    I have had the opportunity to review your recent results and an interpretation is as follows:  Congratulations on your excellent results   No evidence of deep vein thrombosis    Sincerely,  Stephon Dai MD

## 2024-01-02 ENCOUNTER — OFFICE VISIT (OUTPATIENT)
Dept: CARDIOLOGY | Facility: CLINIC | Age: 74
End: 2024-01-02
Payer: MEDICARE

## 2024-01-02 VITALS
SYSTOLIC BLOOD PRESSURE: 136 MMHG | BODY MASS INDEX: 32.35 KG/M2 | HEART RATE: 67 BPM | OXYGEN SATURATION: 95 % | HEIGHT: 70 IN | WEIGHT: 226 LBS | DIASTOLIC BLOOD PRESSURE: 78 MMHG

## 2024-01-02 DIAGNOSIS — Z98.890 S/P AORTIC DISSECTION REPAIR: Primary | ICD-10-CM

## 2024-01-02 PROCEDURE — 99214 OFFICE O/P EST MOD 30 MIN: CPT | Performed by: INTERNAL MEDICINE

## 2024-01-02 NOTE — LETTER
1/2/2024    Stephon Dai MD, MD  [address]  [place] [address]    RE: Vincenzo Moss       Dear Colleague,     I had the pleasure of seeing Vincenzo Moss in the [facility].  Cardiology Progress Note          Assessment and Plan:       Fatigue, borderline bradycardia on beta-blocker  Patient will get an Apple Watch to track his exertional heart rates in the me know if they are above 110 with exercise  May consider reducing his metoprolol slightly but may need to add amlodipine or other antihypertensive as he is already maximized on his losartan and on a diuretic.      History of valve sparing ascending aortic repair 2018, no arch or descending aortic dissection  Last echocardiac 2022 with slightly more aortic regurgitation.  Unremarkable on physical exam today, recheck in 2025    Follow-up in 1 year with echocardiogram    30 minutes was spent with the patient, precharting and reviewing tests as well as post visit charting all done today..    This note was transcribed using electronic voice recognition software and there may be typographical errors present.                    Interval History:     The patient is a very pleasant 73 year old whom I have been following for ascending aortic dissection s/p repair 2018 with valve sparing repair.  Last echocardiogram 2022 compared to 2018 has a little bit more aortic valve regurgitation.  Patient feels pretty well overall with some fatigue.    He has borderline bradycardia at rest and does not know what his exertional heart rates are.                     Review of Systems:     Review of Systems:  Skin:  Negative     Eyes:  Negative    ENT:  Positive for hearing loss  Respiratory:  Negative    Cardiovascular:    edema;Positive for  Gastroenterology: Negative    Genitourinary:  Negative    Musculoskeletal:  Positive for neck pain;joint pain  Neurologic:  Negative    Psychiatric:  Negative    Heme/Lymph/Imm:  Negative   "  Endocrine:  Negative                Physical Exam:     Vitals: /78 (BP Location: Right arm, Patient Position: Sitting, Cuff Size: Adult Regular)   Pulse 67   Ht 1.778 m (5' 10\")   Wt 102.5 kg (226 lb)   SpO2 95%   BMI 32.43 kg/m    Constitutional:  cooperative, alert and oriented, well developed, well nourished, in no acute distress        Skin:  warm and dry to the touch, no apparent skin lesions or masses noted surgical scars well-healed      Head:  normocephalic, no masses or lesions        Eyes:  pupils equal and round, conjunctivae and lids unremarkable, sclera white, no xanthalasma, EOMS intact, no nystagmus        Chest:  normal breath sounds, clear to auscultation, normal A-P diameter, normal symmetry, normal respiratory excursion, no use of accessory muscles        Cardiac: regular rhythm       RUSB;grade 1;early systolic murmur          Extremities and Back:  no deformities, clubbing, cyanosis, erythema observed;no edema        Neurological:  no gross motor deficits;affect appropriate                 Medications:     Current Outpatient Medications   Medication Sig Dispense Refill    aspirin 81 MG chewable tablet Take 1 tablet (81 mg) by mouth daily 30 tablet 3    atorvastatin (LIPITOR) 20 MG tablet Take 1 tablet (20 mg) by mouth daily 90 tablet 3    Coenzyme Q10 (COQ-10) 10 MG CAPS Take 100 mg by mouth daily      hydrochlorothiazide (HYDRODIURIL) 25 MG tablet Take 1 tablet (25 mg) by mouth daily 90 tablet 3    losartan (COZAAR) 100 MG tablet Take 1 tablet (100 mg) by mouth daily 90 tablet 3    metoprolol tartrate (LOPRESSOR) 25 MG tablet Take 1 tablet (25 mg) by mouth 2 times daily 60 tablet 11    VITAMIN D, CHOLECALCIFEROL, PO Take 2,000 Units by mouth daily                  Data:   All laboratory data reviewed  No results found for this or any previous visit (from the past 24 hour(s)).    All laboratory data reviewed  Lab Results   Component Value Date    CHOL 120 11/22/2023     Lab Results " "  Component Value Date    HDL 50 11/22/2023     Lab Results   Component Value Date    LDL 58 11/22/2023     Lab Results   Component Value Date    TRIG 60 11/22/2023     No results found for: \"CHOLHDLRATIO\"  No results found for: \"TSH\"  Last Basic Metabolic Panel:  Lab Results   Component Value Date     11/22/2023     06/13/2018      Lab Results   Component Value Date    POTASSIUM 4.0 11/22/2023    POTASSIUM 4.3 06/13/2018     Lab Results   Component Value Date    CHLORIDE 103 11/22/2023    CHLORIDE 102 06/13/2018     Lab Results   Component Value Date    TAMARA 9.5 11/22/2023    TAMARA 9.6 06/13/2018     Lab Results   Component Value Date    CO2 29 11/22/2023    CO2 30 06/13/2018     Lab Results   Component Value Date    BUN 13.1 11/22/2023    BUN 11 06/13/2018     Lab Results   Component Value Date    CR 0.99 11/22/2023    CR 1.04 06/13/2018     Lab Results   Component Value Date    GLC 93 11/22/2023     06/13/2018     Lab Results   Component Value Date    WBC 5.5 11/22/2023    WBC 6.9 02/18/2018     Lab Results   Component Value Date    RBC 4.69 11/22/2023    RBC 3.28 02/18/2018     Lab Results   Component Value Date    HGB 14.1 11/22/2023    HGB 10.0 02/18/2018     Lab Results   Component Value Date    HCT 43.5 11/22/2023    HCT 29.9 02/18/2018     Lab Results   Component Value Date    MCV 93 11/22/2023    MCV 91 02/18/2018     Lab Results   Component Value Date    MCH 30.1 11/22/2023    MCH 30.5 02/18/2018     Lab Results   Component Value Date    MCHC 32.4 11/22/2023    MCHC 33.4 02/18/2018     Lab Results   Component Value Date    RDW 13.3 11/22/2023    RDW 13.7 02/18/2018     Lab Results   Component Value Date     11/22/2023     02/19/2018             Thank you for allowing me to participate in the care of your patient.      Sincerely,     Can Velazquez MD     [facility]  cc:   No referring provider defined for this " encounter.

## 2024-01-02 NOTE — PATIENT INSTRUCTIONS
Consider getting an apple watch to track heart rates. Ask kids to set up heart rate tracking and show you how to demonstrate range of heart rates with exercise and at rest.    If your heart rates don't get above 110 with exercise, you might feel better with less metoprolol.    If your heart rates don't get above 95 with exercise, you WILL feel better with less medication.    Message me in a few weeks with what you're noticing.

## 2024-01-02 NOTE — PROGRESS NOTES
"Cardiology Progress Note          Assessment and Plan:       Fatigue, borderline bradycardia on beta-blocker  Patient will get an Apple Watch to track his exertional heart rates in the me know if they are above 110 with exercise  May consider reducing his metoprolol slightly but may need to add amlodipine or other antihypertensive as he is already maximized on his losartan and on a diuretic.      History of valve sparing ascending aortic repair 2018, no arch or descending aortic dissection  Last echocardiac 2022 with slightly more aortic regurgitation.  Unremarkable on physical exam today, recheck in 2025    Follow-up in 1 year with echocardiogram    30 minutes was spent with the patient, precharting and reviewing tests as well as post visit charting all done today..    This note was transcribed using electronic voice recognition software and there may be typographical errors present.                    Interval History:     The patient is a very pleasant 73 year old whom I have been following for ascending aortic dissection s/p repair 2018 with valve sparing repair.  Last echocardiogram 2022 compared to 2018 has a little bit more aortic valve regurgitation.  Patient feels pretty well overall with some fatigue.    He has borderline bradycardia at rest and does not know what his exertional heart rates are.                     Review of Systems:     Review of Systems:  Skin:  Negative     Eyes:  Negative    ENT:  Positive for hearing loss  Respiratory:  Negative    Cardiovascular:    edema;Positive for  Gastroenterology: Negative    Genitourinary:  Negative    Musculoskeletal:  Positive for neck pain;joint pain  Neurologic:  Negative    Psychiatric:  Negative    Heme/Lymph/Imm:  Negative    Endocrine:  Negative                Physical Exam:     Vitals: /78 (BP Location: Right arm, Patient Position: Sitting, Cuff Size: Adult Regular)   Pulse 67   Ht 1.778 m (5' 10\")   Wt 102.5 kg (226 lb)   SpO2 95%   BMI " "32.43 kg/m    Constitutional:  cooperative, alert and oriented, well developed, well nourished, in no acute distress        Skin:  warm and dry to the touch, no apparent skin lesions or masses noted surgical scars well-healed      Head:  normocephalic, no masses or lesions        Eyes:  pupils equal and round, conjunctivae and lids unremarkable, sclera white, no xanthalasma, EOMS intact, no nystagmus        Chest:  normal breath sounds, clear to auscultation, normal A-P diameter, normal symmetry, normal respiratory excursion, no use of accessory muscles        Cardiac: regular rhythm       RUSB;grade 1;early systolic murmur          Extremities and Back:  no deformities, clubbing, cyanosis, erythema observed;no edema        Neurological:  no gross motor deficits;affect appropriate                 Medications:     Current Outpatient Medications   Medication Sig Dispense Refill    aspirin 81 MG chewable tablet Take 1 tablet (81 mg) by mouth daily 30 tablet 3    atorvastatin (LIPITOR) 20 MG tablet Take 1 tablet (20 mg) by mouth daily 90 tablet 3    Coenzyme Q10 (COQ-10) 10 MG CAPS Take 100 mg by mouth daily      hydrochlorothiazide (HYDRODIURIL) 25 MG tablet Take 1 tablet (25 mg) by mouth daily 90 tablet 3    losartan (COZAAR) 100 MG tablet Take 1 tablet (100 mg) by mouth daily 90 tablet 3    metoprolol tartrate (LOPRESSOR) 25 MG tablet Take 1 tablet (25 mg) by mouth 2 times daily 60 tablet 11    VITAMIN D, CHOLECALCIFEROL, PO Take 2,000 Units by mouth daily                  Data:   All laboratory data reviewed  No results found for this or any previous visit (from the past 24 hour(s)).    All laboratory data reviewed  Lab Results   Component Value Date    CHOL 120 11/22/2023     Lab Results   Component Value Date    HDL 50 11/22/2023     Lab Results   Component Value Date    LDL 58 11/22/2023     Lab Results   Component Value Date    TRIG 60 11/22/2023     No results found for: \"CHOLHDLRATIO\"  No results found for: " "\"TSH\"  Last Basic Metabolic Panel:  Lab Results   Component Value Date     11/22/2023     06/13/2018      Lab Results   Component Value Date    POTASSIUM 4.0 11/22/2023    POTASSIUM 4.3 06/13/2018     Lab Results   Component Value Date    CHLORIDE 103 11/22/2023    CHLORIDE 102 06/13/2018     Lab Results   Component Value Date    TAMARA 9.5 11/22/2023    TAMARA 9.6 06/13/2018     Lab Results   Component Value Date    CO2 29 11/22/2023    CO2 30 06/13/2018     Lab Results   Component Value Date    BUN 13.1 11/22/2023    BUN 11 06/13/2018     Lab Results   Component Value Date    CR 0.99 11/22/2023    CR 1.04 06/13/2018     Lab Results   Component Value Date    GLC 93 11/22/2023     06/13/2018     Lab Results   Component Value Date    WBC 5.5 11/22/2023    WBC 6.9 02/18/2018     Lab Results   Component Value Date    RBC 4.69 11/22/2023    RBC 3.28 02/18/2018     Lab Results   Component Value Date    HGB 14.1 11/22/2023    HGB 10.0 02/18/2018     Lab Results   Component Value Date    HCT 43.5 11/22/2023    HCT 29.9 02/18/2018     Lab Results   Component Value Date    MCV 93 11/22/2023    MCV 91 02/18/2018     Lab Results   Component Value Date    MCH 30.1 11/22/2023    MCH 30.5 02/18/2018     Lab Results   Component Value Date    MCHC 32.4 11/22/2023    MCHC 33.4 02/18/2018     Lab Results   Component Value Date    RDW 13.3 11/22/2023    RDW 13.7 02/18/2018     Lab Results   Component Value Date     11/22/2023     02/19/2018               "

## 2024-11-12 DIAGNOSIS — I10 BENIGN ESSENTIAL HYPERTENSION: ICD-10-CM

## 2024-11-12 DIAGNOSIS — Z98.890 S/P AORTIC DISSECTION REPAIR: ICD-10-CM

## 2024-11-12 RX ORDER — LOSARTAN POTASSIUM 100 MG/1
100 TABLET ORAL DAILY
Qty: 90 TABLET | Refills: 0 | Status: SHIPPED | OUTPATIENT
Start: 2024-11-12

## 2024-11-12 RX ORDER — HYDROCHLOROTHIAZIDE 25 MG/1
25 TABLET ORAL DAILY
Qty: 90 TABLET | Refills: 0 | Status: SHIPPED | OUTPATIENT
Start: 2024-11-12

## 2024-11-12 RX ORDER — METOPROLOL TARTRATE 25 MG/1
25 TABLET, FILM COATED ORAL 2 TIMES DAILY
Qty: 180 TABLET | Refills: 0 | Status: SHIPPED | OUTPATIENT
Start: 2024-11-12

## 2024-11-12 RX ORDER — ATORVASTATIN CALCIUM 20 MG/1
20 TABLET, FILM COATED ORAL DAILY
Qty: 90 TABLET | Refills: 0 | Status: SHIPPED | OUTPATIENT
Start: 2024-11-12

## 2024-11-18 ENCOUNTER — HOSPITAL ENCOUNTER (OUTPATIENT)
Dept: CARDIOLOGY | Facility: CLINIC | Age: 74
Discharge: HOME | End: 2024-11-18
Attending: INTERNAL MEDICINE | Admitting: INTERNAL MEDICINE
Payer: MEDICARE

## 2024-11-18 DIAGNOSIS — Z98.890 S/P AORTIC DISSECTION REPAIR: Primary | ICD-10-CM

## 2024-11-18 LAB — LVEF ECHO: NORMAL

## 2024-11-18 PROCEDURE — 93306 TTE W/DOPPLER COMPLETE: CPT | Mod: 26 | Performed by: INTERNAL MEDICINE

## 2024-11-18 PROCEDURE — 999N000208 ECHOCARDIOGRAM COMPLETE

## 2024-11-18 PROCEDURE — C8929 TTE W OR WO FOL WCON,DOPPLER: HCPCS

## 2024-11-18 PROCEDURE — 255N000002 HC RX 255 OP 636: Performed by: INTERNAL MEDICINE

## 2024-11-18 RX ADMIN — HUMAN ALBUMIN MICROSPHERES AND PERFLUTREN 9 ML: 10; .22 INJECTION, SOLUTION INTRAVENOUS at 10:32

## 2024-11-20 ENCOUNTER — OFFICE VISIT (OUTPATIENT)
Dept: CARDIOLOGY | Facility: CLINIC | Age: 74
End: 2024-11-20
Payer: MEDICARE

## 2024-11-20 VITALS
BODY MASS INDEX: 32.21 KG/M2 | HEIGHT: 70 IN | WEIGHT: 225 LBS | DIASTOLIC BLOOD PRESSURE: 81 MMHG | HEART RATE: 58 BPM | SYSTOLIC BLOOD PRESSURE: 135 MMHG

## 2024-11-20 DIAGNOSIS — Z98.890 S/P AORTIC DISSECTION REPAIR: ICD-10-CM

## 2024-11-20 NOTE — LETTER
11/20/2024    Stephon Dai MD, MD  [address] [facility] [address]  [facility] [address]    RE: Vincenzo Moss       Dear Colleague,     I had the pleasure of seeing Vincenzo Moss in the [facility].  Cardiology Progress Note          Assessment and Plan:       History of aortic dissection with valve sparing repair 2018  Continue conservative management.  Good blood pressure control.  Aortic regurgitation is stable on echocardiogram.  May consider reducing his metoprolol down to 12.5 mg twice per day if he feels his energy level or fatigue is worsening.  Follow-up with me in 1 year with echocardiogram    30 minutes was spent with the patient, precharting and reviewing tests as well as post visit charting all done today..    This note was transcribed using electronic voice recognition software and there may be typographical errors present.     The longitudinal plan of care for the diagnosis(es)/condition(s) as documented were addressed during this visit. Due to the added complexity in care, I will continue to support Vincenzo in the subsequent management and with ongoing continuity of care.                    Interval History:     The patient is a very pleasant 74 year old whom I have been following for ascending aortic dissection s/p repair 2018, valve sparing.  His most recent echocardiogram 2024 has improved aortic regurgitation to just trace, it was mild in 2022.    Blood pressure is under good control.  He feels that his energy level is good and he has been able to do everything that he wants to do.  He is currently taking his metoprolol 25 mg just once per day in the morning.  It is the short acting preparation.                     Review of Systems:     Review of Systems:  Skin:        Eyes:       ENT:       Respiratory:  Positive for shortness of breath;dyspnea on exertion  Cardiovascular:    edema;Positive for;dizziness;lightheadedness;fatigue  Gastroenterology:      Genitourinary:      "  Musculoskeletal:       Neurologic:       Psychiatric:       Heme/Lymph/Imm:       Endocrine:  Negative thyroid disorder;diabetes              Physical Exam:     Vitals: /81   Pulse 58   Ht 1.778 m (5' 10\")   Wt 102.1 kg (225 lb)   BMI 32.28 kg/m    Constitutional:  cooperative, alert and oriented, well developed, well nourished, in no acute distress        Skin:  warm and dry to the touch, no apparent skin lesions or masses noted surgical scars well-healed      Head:  normocephalic, no masses or lesions        Eyes:  pupils equal and round, conjunctivae and lids unremarkable, sclera white, no xanthalasma, EOMS intact, no nystagmus        Chest:  normal breath sounds, clear to auscultation, normal A-P diameter, normal symmetry, normal respiratory excursion, no use of accessory muscles        Cardiac: regular rhythm       RUSB;grade 1;early systolic murmur          Extremities and Back:  no deformities, clubbing, cyanosis, erythema observed;no edema        Neurological:  no gross motor deficits;affect appropriate                 Medications:     Current Outpatient Medications   Medication Sig Dispense Refill     aspirin 81 MG chewable tablet Take 1 tablet (81 mg) by mouth daily 30 tablet 3     atorvastatin (LIPITOR) 20 MG tablet TAKE 1 TABLET BY MOUTH EVERY DAY 90 tablet 0     Coenzyme Q10 (COQ-10) 10 MG CAPS Take 100 mg by mouth daily       hydrochlorothiazide (HYDRODIURIL) 25 MG tablet TAKE 1 TABLET BY MOUTH EVERY DAY 90 tablet 0     losartan (COZAAR) 100 MG tablet TAKE 1 TABLET BY MOUTH EVERY DAY 90 tablet 0     metoprolol tartrate (LOPRESSOR) 25 MG tablet TAKE 1 TABLET BY MOUTH TWICE A DAY (Patient taking differently: Take 25 mg by mouth daily.) 180 tablet 0     VITAMIN D, CHOLECALCIFEROL, PO Take 2,000 Units by mouth daily                  Data:   All laboratory data reviewed  No results found for this or any previous visit (from the past 24 hours).    All laboratory data reviewed  Lab Results " "  Component Value Date    CHOL 120 11/22/2023     Lab Results   Component Value Date    HDL 50 11/22/2023     Lab Results   Component Value Date    LDL 58 11/22/2023     Lab Results   Component Value Date    TRIG 60 11/22/2023     No results found for: \"CHOLHDLRATIO\"  No results found for: \"TSH\"  Last Basic Metabolic Panel:  Lab Results   Component Value Date     11/22/2023     06/13/2018      Lab Results   Component Value Date    POTASSIUM 4.0 11/22/2023    POTASSIUM 4.3 06/13/2018     Lab Results   Component Value Date    CHLORIDE 103 11/22/2023    CHLORIDE 102 06/13/2018     Lab Results   Component Value Date    TAMARA 9.5 11/22/2023    TAMARA 9.6 06/13/2018     Lab Results   Component Value Date    CO2 29 11/22/2023    CO2 30 06/13/2018     Lab Results   Component Value Date    BUN 13.1 11/22/2023    BUN 11 06/13/2018     Lab Results   Component Value Date    CR 0.99 11/22/2023    CR 1.04 06/13/2018     Lab Results   Component Value Date    GLC 93 11/22/2023     06/13/2018     Lab Results   Component Value Date    WBC 5.5 11/22/2023    WBC 6.9 02/18/2018     Lab Results   Component Value Date    RBC 4.69 11/22/2023    RBC 3.28 02/18/2018     Lab Results   Component Value Date    HGB 14.1 11/22/2023    HGB 10.0 02/18/2018     Lab Results   Component Value Date    HCT 43.5 11/22/2023    HCT 29.9 02/18/2018     Lab Results   Component Value Date    MCV 93 11/22/2023    MCV 91 02/18/2018     Lab Results   Component Value Date    MCH 30.1 11/22/2023    MCH 30.5 02/18/2018     Lab Results   Component Value Date    MCHC 32.4 11/22/2023    MCHC 33.4 02/18/2018     Lab Results   Component Value Date    RDW 13.3 11/22/2023    RDW 13.7 02/18/2018     Lab Results   Component Value Date     11/22/2023     02/19/2018             Thank you for allowing me to participate in the care of your patient.      Sincerely,     Can Velazquez MD     [facility] " [facility]  cc:   Can Velazquez MD  [id number]  [place] [id number]

## 2024-11-20 NOTE — PROGRESS NOTES
"Cardiology Progress Note          Assessment and Plan:       History of aortic dissection with valve sparing repair 2018  Continue conservative management.  Good blood pressure control.  Aortic regurgitation is stable on echocardiogram.  May consider reducing his metoprolol down to 12.5 mg twice per day if he feels his energy level or fatigue is worsening.  Follow-up with me in 1 year with echocardiogram    30 minutes was spent with the patient, precharting and reviewing tests as well as post visit charting all done today..    This note was transcribed using electronic voice recognition software and there may be typographical errors present.     The longitudinal plan of care for the diagnosis(es)/condition(s) as documented were addressed during this visit. Due to the added complexity in care, I will continue to support Vincenzo in the subsequent management and with ongoing continuity of care.                    Interval History:     The patient is a very pleasant 74 year old whom I have been following for ascending aortic dissection s/p repair 2018, valve sparing.  His most recent echocardiogram 2024 has improved aortic regurgitation to just trace, it was mild in 2022.    Blood pressure is under good control.  He feels that his energy level is good and he has been able to do everything that he wants to do.  He is currently taking his metoprolol 25 mg just once per day in the morning.  It is the short acting preparation.                     Review of Systems:     Review of Systems:  Skin:        Eyes:       ENT:       Respiratory:  Positive for shortness of breath;dyspnea on exertion  Cardiovascular:    edema;Positive for;dizziness;lightheadedness;fatigue  Gastroenterology:      Genitourinary:       Musculoskeletal:       Neurologic:       Psychiatric:       Heme/Lymph/Imm:       Endocrine:  Negative thyroid disorder;diabetes              Physical Exam:     Vitals: /81   Pulse 58   Ht 1.778 m (5' 10\")   Wt " 102.1 kg (225 lb)   BMI 32.28 kg/m    Constitutional:  cooperative, alert and oriented, well developed, well nourished, in no acute distress        Skin:  warm and dry to the touch, no apparent skin lesions or masses noted surgical scars well-healed      Head:  normocephalic, no masses or lesions        Eyes:  pupils equal and round, conjunctivae and lids unremarkable, sclera white, no xanthalasma, EOMS intact, no nystagmus        Chest:  normal breath sounds, clear to auscultation, normal A-P diameter, normal symmetry, normal respiratory excursion, no use of accessory muscles        Cardiac: regular rhythm       RUSB;grade 1;early systolic murmur          Extremities and Back:  no deformities, clubbing, cyanosis, erythema observed;no edema        Neurological:  no gross motor deficits;affect appropriate                 Medications:     Current Outpatient Medications   Medication Sig Dispense Refill    aspirin 81 MG chewable tablet Take 1 tablet (81 mg) by mouth daily 30 tablet 3    atorvastatin (LIPITOR) 20 MG tablet TAKE 1 TABLET BY MOUTH EVERY DAY 90 tablet 0    Coenzyme Q10 (COQ-10) 10 MG CAPS Take 100 mg by mouth daily      hydrochlorothiazide (HYDRODIURIL) 25 MG tablet TAKE 1 TABLET BY MOUTH EVERY DAY 90 tablet 0    losartan (COZAAR) 100 MG tablet TAKE 1 TABLET BY MOUTH EVERY DAY 90 tablet 0    metoprolol tartrate (LOPRESSOR) 25 MG tablet TAKE 1 TABLET BY MOUTH TWICE A DAY (Patient taking differently: Take 25 mg by mouth daily.) 180 tablet 0    VITAMIN D, CHOLECALCIFEROL, PO Take 2,000 Units by mouth daily                  Data:   All laboratory data reviewed  No results found for this or any previous visit (from the past 24 hours).    All laboratory data reviewed  Lab Results   Component Value Date    CHOL 120 11/22/2023     Lab Results   Component Value Date    HDL 50 11/22/2023     Lab Results   Component Value Date    LDL 58 11/22/2023     Lab Results   Component Value Date    TRIG 60 11/22/2023     No  "results found for: \"CHOLHDLRATIO\"  No results found for: \"TSH\"  Last Basic Metabolic Panel:  Lab Results   Component Value Date     11/22/2023     06/13/2018      Lab Results   Component Value Date    POTASSIUM 4.0 11/22/2023    POTASSIUM 4.3 06/13/2018     Lab Results   Component Value Date    CHLORIDE 103 11/22/2023    CHLORIDE 102 06/13/2018     Lab Results   Component Value Date    TAMARA 9.5 11/22/2023    TAMARA 9.6 06/13/2018     Lab Results   Component Value Date    CO2 29 11/22/2023    CO2 30 06/13/2018     Lab Results   Component Value Date    BUN 13.1 11/22/2023    BUN 11 06/13/2018     Lab Results   Component Value Date    CR 0.99 11/22/2023    CR 1.04 06/13/2018     Lab Results   Component Value Date    GLC 93 11/22/2023     06/13/2018     Lab Results   Component Value Date    WBC 5.5 11/22/2023    WBC 6.9 02/18/2018     Lab Results   Component Value Date    RBC 4.69 11/22/2023    RBC 3.28 02/18/2018     Lab Results   Component Value Date    HGB 14.1 11/22/2023    HGB 10.0 02/18/2018     Lab Results   Component Value Date    HCT 43.5 11/22/2023    HCT 29.9 02/18/2018     Lab Results   Component Value Date    MCV 93 11/22/2023    MCV 91 02/18/2018     Lab Results   Component Value Date    MCH 30.1 11/22/2023    MCH 30.5 02/18/2018     Lab Results   Component Value Date    MCHC 32.4 11/22/2023    MCHC 33.4 02/18/2018     Lab Results   Component Value Date    RDW 13.3 11/22/2023    RDW 13.7 02/18/2018     Lab Results   Component Value Date     11/22/2023     02/19/2018               "

## 2024-11-20 NOTE — PATIENT INSTRUCTIONS
If you feel like your motivation is worse and more fatigue, you may try taking 1/2 pill of the metoprolol in the morning. You don't have to take any of it in the evening. You can do this on your own as an experiment anytime, just let me know what you discover.    See you in a year with another echo.

## 2024-12-16 ENCOUNTER — MYC MEDICAL ADVICE (OUTPATIENT)
Dept: FAMILY MEDICINE | Facility: CLINIC | Age: 74
End: 2024-12-16

## 2025-01-28 ENCOUNTER — OFFICE VISIT (OUTPATIENT)
Dept: FAMILY MEDICINE | Facility: CLINIC | Age: 75
End: 2025-01-28
Payer: MEDICARE

## 2025-01-28 VITALS
RESPIRATION RATE: 16 BRPM | TEMPERATURE: 98.2 F | SYSTOLIC BLOOD PRESSURE: 175 MMHG | BODY MASS INDEX: 32.64 KG/M2 | DIASTOLIC BLOOD PRESSURE: 84 MMHG | WEIGHT: 228 LBS | HEART RATE: 62 BPM | OXYGEN SATURATION: 95 % | HEIGHT: 70 IN

## 2025-01-28 DIAGNOSIS — Z00.00 ROUTINE GENERAL MEDICAL EXAMINATION AT A HEALTH CARE FACILITY: Primary | ICD-10-CM

## 2025-01-28 DIAGNOSIS — Z12.5 SCREENING FOR PROSTATE CANCER: ICD-10-CM

## 2025-01-28 DIAGNOSIS — D35.02 ADENOMA OF LEFT ADRENAL GLAND: ICD-10-CM

## 2025-01-28 DIAGNOSIS — Z12.11 SPECIAL SCREENING FOR MALIGNANT NEOPLASMS, COLON: ICD-10-CM

## 2025-01-28 DIAGNOSIS — Z13.6 CARDIOVASCULAR SCREENING; LDL GOAL LESS THAN 160: ICD-10-CM

## 2025-01-28 DIAGNOSIS — I10 BENIGN ESSENTIAL HYPERTENSION: ICD-10-CM

## 2025-01-28 DIAGNOSIS — R29.818 FOCAL NEUROLOGICAL DEFICIT: ICD-10-CM

## 2025-01-28 DIAGNOSIS — E66.09 CLASS 1 OBESITY DUE TO EXCESS CALORIES WITHOUT SERIOUS COMORBIDITY WITH BODY MASS INDEX (BMI) OF 32.0 TO 32.9 IN ADULT: ICD-10-CM

## 2025-01-28 DIAGNOSIS — E66.811 CLASS 1 OBESITY DUE TO EXCESS CALORIES WITHOUT SERIOUS COMORBIDITY WITH BODY MASS INDEX (BMI) OF 32.0 TO 32.9 IN ADULT: ICD-10-CM

## 2025-01-28 DIAGNOSIS — Z98.890 S/P AORTIC DISSECTION REPAIR: ICD-10-CM

## 2025-01-28 LAB
ALBUMIN SERPL BCG-MCNC: 4.3 G/DL (ref 3.5–5.2)
ALP SERPL-CCNC: 70 U/L (ref 40–150)
ALT SERPL W P-5'-P-CCNC: 25 U/L (ref 0–70)
ANION GAP SERPL CALCULATED.3IONS-SCNC: 10 MMOL/L (ref 7–15)
AST SERPL W P-5'-P-CCNC: 31 U/L (ref 0–45)
BILIRUB SERPL-MCNC: 1 MG/DL
BUN SERPL-MCNC: 11.9 MG/DL (ref 8–23)
CALCIUM SERPL-MCNC: 9.9 MG/DL (ref 8.8–10.4)
CHLORIDE SERPL-SCNC: 102 MMOL/L (ref 98–107)
CHOLEST SERPL-MCNC: 130 MG/DL
CREAT SERPL-MCNC: 0.93 MG/DL (ref 0.67–1.17)
EGFRCR SERPLBLD CKD-EPI 2021: 86 ML/MIN/1.73M2
ERYTHROCYTE [DISTWIDTH] IN BLOOD BY AUTOMATED COUNT: 13.7 % (ref 10–15)
FASTING STATUS PATIENT QL REPORTED: NO
FASTING STATUS PATIENT QL REPORTED: NO
GLUCOSE SERPL-MCNC: 105 MG/DL (ref 70–99)
HCO3 SERPL-SCNC: 26 MMOL/L (ref 22–29)
HCT VFR BLD AUTO: 43.6 % (ref 40–53)
HDLC SERPL-MCNC: 58 MG/DL
HGB BLD-MCNC: 14.3 G/DL (ref 13.3–17.7)
LDLC SERPL CALC-MCNC: 62 MG/DL
MCH RBC QN AUTO: 30.1 PG (ref 26.5–33)
MCHC RBC AUTO-ENTMCNC: 32.8 G/DL (ref 31.5–36.5)
MCV RBC AUTO: 92 FL (ref 78–100)
NONHDLC SERPL-MCNC: 72 MG/DL
PLATELET # BLD AUTO: 129 10E3/UL (ref 150–450)
POTASSIUM SERPL-SCNC: 4 MMOL/L (ref 3.4–5.3)
PROT SERPL-MCNC: 7.1 G/DL (ref 6.4–8.3)
PSA SERPL DL<=0.01 NG/ML-MCNC: 2.27 NG/ML (ref 0–6.5)
RBC # BLD AUTO: 4.75 10E6/UL (ref 4.4–5.9)
SODIUM SERPL-SCNC: 138 MMOL/L (ref 135–145)
TRIGL SERPL-MCNC: 48 MG/DL
WBC # BLD AUTO: 7 10E3/UL (ref 4–11)

## 2025-01-28 PROCEDURE — 36415 COLL VENOUS BLD VENIPUNCTURE: CPT | Performed by: INTERNAL MEDICINE

## 2025-01-28 PROCEDURE — 80061 LIPID PANEL: CPT | Performed by: INTERNAL MEDICINE

## 2025-01-28 PROCEDURE — 80053 COMPREHEN METABOLIC PANEL: CPT | Performed by: INTERNAL MEDICINE

## 2025-01-28 PROCEDURE — 99214 OFFICE O/P EST MOD 30 MIN: CPT | Mod: 25 | Performed by: INTERNAL MEDICINE

## 2025-01-28 PROCEDURE — G0439 PPPS, SUBSEQ VISIT: HCPCS | Performed by: INTERNAL MEDICINE

## 2025-01-28 PROCEDURE — G2211 COMPLEX E/M VISIT ADD ON: HCPCS | Performed by: INTERNAL MEDICINE

## 2025-01-28 PROCEDURE — 85027 COMPLETE CBC AUTOMATED: CPT | Performed by: INTERNAL MEDICINE

## 2025-01-28 PROCEDURE — G0103 PSA SCREENING: HCPCS | Performed by: INTERNAL MEDICINE

## 2025-01-28 RX ORDER — ATORVASTATIN CALCIUM 20 MG/1
20 TABLET, FILM COATED ORAL DAILY
Qty: 90 TABLET | Refills: 3 | Status: SHIPPED | OUTPATIENT
Start: 2025-01-28

## 2025-01-28 RX ORDER — METOPROLOL SUCCINATE 25 MG/1
25 TABLET, EXTENDED RELEASE ORAL DAILY
Qty: 90 TABLET | Refills: 3 | Status: SHIPPED | OUTPATIENT
Start: 2025-01-28

## 2025-01-28 RX ORDER — LOSARTAN POTASSIUM 100 MG/1
100 TABLET ORAL DAILY
Qty: 90 TABLET | Refills: 3 | Status: SHIPPED | OUTPATIENT
Start: 2025-01-28

## 2025-01-28 RX ORDER — METOPROLOL TARTRATE 25 MG/1
25 TABLET, FILM COATED ORAL DAILY
Qty: 90 TABLET | Refills: 3 | Status: CANCELLED | OUTPATIENT
Start: 2025-01-28

## 2025-01-28 RX ORDER — HYDROCHLOROTHIAZIDE 25 MG/1
25 TABLET ORAL DAILY
Qty: 90 TABLET | Refills: 3 | Status: SHIPPED | OUTPATIENT
Start: 2025-01-28

## 2025-01-28 SDOH — HEALTH STABILITY: PHYSICAL HEALTH: ON AVERAGE, HOW MANY DAYS PER WEEK DO YOU ENGAGE IN MODERATE TO STRENUOUS EXERCISE (LIKE A BRISK WALK)?: 4 DAYS

## 2025-01-28 ASSESSMENT — SOCIAL DETERMINANTS OF HEALTH (SDOH): HOW OFTEN DO YOU GET TOGETHER WITH FRIENDS OR RELATIVES?: ONCE A WEEK

## 2025-01-28 ASSESSMENT — PAIN SCALES - GENERAL: PAINLEVEL_OUTOF10: NO PAIN (0)

## 2025-01-28 NOTE — PATIENT INSTRUCTIONS
(Z00.00) Routine general medical examination at a health care facility  (primary encounter diagnosis)  Comment: For routine exam, we will draw labs as ordered, cholesterol, diabetes mellitus check, liver function, renal function, PSA and refer for colonoscopy.  We will also update vaccination history.  Plan:     (Z98.890) S/P aortic dissection repair  Comment: Doing very well.  Continue annual follow up in cardiology.  Note that metoprolol dose adjusted to take Metoprolol Succinate 25 mg daily   Plan: atorvastatin (LIPITOR) 20 MG tablet,         hydrochlorothiazide (HYDRODIURIL) 25 MG tablet,        metoprolol succinate ER (TOPROL XL) 25 MG 24 hr        tablet            (I10) Benign essential hypertension  Comment: blood pressure is stable usually when checked at home  Plan: hydrochlorothiazide (HYDRODIURIL) 25 MG tablet,        losartan (COZAAR) 100 MG tablet, metoprolol         succinate ER (TOPROL XL) 25 MG 24 hr tablet,         Comprehensive metabolic panel, CBC with         platelets            (E66.811,  E66.09,  Z68.32) Class 1 obesity due to excess calories without serious comorbidity with body mass index (BMI) of 32.0 to 32.9 in adult  Comment: Continue to work on weight loss   Plan:     (R29.818) Focal neurological deficit  Comment: Noted periodic loss of function of left hand - recommend priority neurology consult   Plan: Adult Neurology  Referral            (Z13.6) CARDIOVASCULAR SCREENING; LDL GOAL LESS THAN 160  Comment: check fasting lipid panel   Plan: Lipid panel reflex to direct LDL Non-fasting            (Z12.5) Screening for prostate cancer  Comment:   Plan: Prostate Specific Antigen Screen            (D35.02) Adenoma of left adrenal gland  Comment: Request CT abdomen to follow up on this [facility] phone #[phone]   Plan: CT Abdomen Pelvis w/o Contrast              7

## 2025-01-28 NOTE — PROGRESS NOTES
Preventive Care Visit  [facility]  Stephon Dai MD, MD, Internal Medicine  Jan 28, 2025      Stan Loya is a 74 year old, presenting for the following:  Physical (Nonfasting)          HPI        Health Care Directive  Patient does not have a Health Care Directive: Discussed advance care planning with patient; information given to patient to review.      1/28/2025   General Health   How would you rate your overall physical health? Good   Feel stress (tense, anxious, or unable to sleep) Only a little   (!) STRESS CONCERN      1/28/2025   Nutrition   Diet: Regular (no restrictions)         1/28/2025   Exercise   Days per week of moderate/strenous exercise 4 days         1/28/2025   Social Factors   Frequency of gathering with friends or relatives Once a week   Worry food won't last until get money to buy more No   Food not last or not have enough money for food? No   Do you have housing? (Housing is defined as stable permanent housing and does not include staying ouside in a car, in a tent, in an abandoned building, in an overnight shelter, or couch-surfing.) Yes   Are you worried about losing your housing? No   Lack of transportation? No   Unable to get utilities (heat,electricity)? No         1/28/2025   Fall Risk   Fallen 2 or more times in the past year? No   Trouble with walking or balance? No          1/28/2025   Activities of Daily Living- Home Safety   Needs help with the following daily activites None of the above   Safety concerns in the home None of the above         1/28/2025   Dental   Dentist two times every year? Yes         1/28/2025   Hearing Screening   Hearing concerns? None of the above         1/28/2025   Driving Risk Screening   Patient/family members have concerns about driving No         1/28/2025   General Alertness/Fatigue Screening   Have you been more tired than usual lately? No         1/28/2025   Urinary Incontinence Screening   Bothered by leaking  urine in past 6 months Yes         12/11/2024   TB Screening   Were you born outside of the [place]? No         Today's PHQ-2 Score:       1/28/2025     8:14 AM   PHQ-2 ( 1999 [employer])   Q1: Little interest or pleasure in doing things 0   Q2: Feeling down, depressed or hopeless 1   PHQ-2 Score 1    Q1: Little interest or pleasure in doing things Not at all   Q2: Feeling down, depressed or hopeless Several days   PHQ-2 Score 1       Patient-reported           1/28/2025   Substance Use   Alcohol more than 3/day or more than 7/wk No   Do you have a current opioid prescription? No   How severe/bad is pain from 1 to 10? 0/10 (No Pain)   Do you use any other substances recreationally? No     Social History     Tobacco Use    Smoking status: Never    Smokeless tobacco: Never   Vaping Use    Vaping status: Never Used   Substance Use Topics    Alcohol use: Yes     Comment: light use    Drug use: No           1/28/2025   AAA Screening   Family history of Abdominal Aortic Aneurysm (AAA)? No   ASCVD Risk   The ASCVD Risk score (Aba GAMA, et al., 2019) failed to calculate for the following reasons:    The valid total cholesterol range is 130 to 320 mg/dL          Reviewed and updated as needed this visit by Provider                    Past Medical History:   Diagnosis Date    Arthritis     Hands and feet    BPH (benign prostatic hyperplasia)     GERD (gastroesophageal reflux disease)     [place] (hard of hearing)     Hyperlipidemia     Hypertension     Urinary frequency      Current providers sharing in care for this patient include:  Patient Care Team:  Stephon Dai MD as PCP - General  Can Velazquez MD as Assigned Heart and Vascular Provider  Stephon Dai MD as Assigned PCP    The following health maintenance items are reviewed in Epic and correct as of today:  Health Maintenance   Topic Date Due    MEDICARE ANNUAL WELLNESS VISIT  11/22/2024    BMP  11/22/2024    LIPID  11/22/2024    ANNUAL  "REVIEW OF HM ORDERS  11/22/2024    COLORECTAL CANCER SCREENING  02/02/2025    FALL RISK ASSESSMENT  01/28/2026    DTAP/TDAP/TD IMMUNIZATION (5 - Td or Tdap) 08/20/2026    GLUCOSE  11/22/2026    ADVANCE CARE PLANNING  11/26/2028    HEPATITIS C SCREENING  Completed    PHQ-2 (once per calendar year)  Completed    INFLUENZA VACCINE  Completed    Pneumococcal Vaccine: 50+ Years  Completed    ZOSTER IMMUNIZATION  Completed    RSV VACCINE  Completed    COVID-19 Vaccine  Completed    HPV IMMUNIZATION  Aged Out    MENINGITIS IMMUNIZATION  Aged Out    RSV MONOCLONAL ANTIBODY  Aged Out        S/P aortic dissection repair  Benign essential hypertension  Blood pressure readings have been in the 125 systolic range on average when checked at home.  Taking metoprolol only 25 mg daily.    Class 1 obesity due to excess calories without serious comorbidity with body mass index (BMI) of 32.0 to 32.9 in adult    Has been starting to get back into weight lifting and exercising    Review of Systems  Constitutional, HEENT, cardiovascular, pulmonary, GI, , musculoskeletal - degenerative joint disease left knee, neurology -  had a recent episode of absent ability to move left hand x 2 minutes on two occasions, skin, endocrine and psych systems are negative, except as otherwise noted.     Objective    Exam  BP (!) 151/74   Pulse 79   Temp 98.2  F (36.8  C)   Resp 16   Ht 1.778 m (5' 10\")   Wt 103.4 kg (228 lb)   SpO2 95%   BMI 32.71 kg/m     Estimated body mass index is 32.71 kg/m  as calculated from the following:    Height as of this encounter: 1.778 m (5' 10\").    Weight as of this encounter: 103.4 kg (228 lb).    Physical Exam  GENERAL: alert and no distress  EYES: Eyes grossly normal to inspection, PERRL and conjunctivae and sclerae normal  HENT: ear canals and TM's normal, nose and mouth without ulcers or lesions  NECK: no adenopathy, no asymmetry, masses, or scars  RESP: lungs clear to auscultation - no rales, rhonchi or " wheezes  CV: regular rate and rhythm, normal S1 S2, no S3 or S4, no murmur, click or rub, no peripheral edema  ABDOMEN: soft, nontender, no hepatosplenomegaly, no masses and bowel sounds normal  MS: no gross musculoskeletal defects noted, no edema  SKIN: no suspicious lesions or rashes  NEURO: Normal strength and tone, mentation intact and speech normal  PSYCH: mentation appears normal, affect normal/bright         1/28/2025   Mini Cog   Clock Draw Score 0 Abnormal   3 Item Recall 3 objects recalled   Mini Cog Total Score 3            Patient Instructions   (Z00.00) Routine general medical examination at a health care facility  (primary encounter diagnosis)  Comment: For routine exam, we will draw labs as ordered, cholesterol, diabetes mellitus check, liver function, renal function, PSA and refer for colonoscopy.  We will also update vaccination history.  Plan:     (Z98.890) S/P aortic dissection repair  Comment: Doing very well.  Continue annual follow up in cardiology.  Note that metoprolol dose adjusted to take Metoprolol Succinate 25 mg daily   Plan: atorvastatin (LIPITOR) 20 MG tablet,         hydrochlorothiazide (HYDRODIURIL) 25 MG tablet,        metoprolol succinate ER (TOPROL XL) 25 MG 24 hr        tablet            (I10) Benign essential hypertension  Comment: blood pressure is stable usually when checked at home  Plan: hydrochlorothiazide (HYDRODIURIL) 25 MG tablet,        losartan (COZAAR) 100 MG tablet, metoprolol         succinate ER (TOPROL XL) 25 MG 24 hr tablet,         Comprehensive metabolic panel, CBC with         platelets            (E66.811,  E66.09,  Z68.32) Class 1 obesity due to excess calories without serious comorbidity with body mass index (BMI) of 32.0 to 32.9 in adult  Comment: Continue to work on weight loss   Plan:     (R29.818) Focal neurological deficit  Comment: Noted periodic loss of function of left hand - recommend priority neurology consult   Plan: Adult Neurology   Referral            (Z13.6) CARDIOVASCULAR SCREENING; LDL GOAL LESS THAN 160  Comment: check fasting lipid panel   Plan: Lipid panel reflex to direct LDL Non-fasting            (Z12.5) Screening for prostate cancer  Comment:   Plan: Prostate Specific Antigen Screen            (D35.02) Adenoma of left adrenal gland  Comment: Request CT abdomen to follow up on this [facility] phone #[phone]   Plan: CT Abdomen Pelvis w/o Contrast              Appropriate preventative services were discussed with the patient, including applicable screening as appropriate for fall preventation, nutrition, physical activity, tobacco-use cessation, weight loss and cognition.  Checklist reviewing preventive services available has been given to the patient.  Reviewed patients plan of care and provided an AVS. The Basic Care Plan (routing screening as documented in Health Maintenance) for the patient meets the Care Plan requirement.  This Care Plan has been established and reviewed with the patient.     The longitudinal plan of care for the diagnosis(es)/condition(s) as documented were addressed during this visit. Due to the added complexity in care, I will continue to support Vincenzo in the subsequent management and with ongoing continuity of care.    Signed Electronically by: Stephon Dai MD, MD

## 2025-02-11 ENCOUNTER — ANCILLARY PROCEDURE (OUTPATIENT)
Dept: CT IMAGING | Facility: CLINIC | Age: 75
End: 2025-02-11
Attending: INTERNAL MEDICINE
Payer: MEDICARE

## 2025-02-11 DIAGNOSIS — D35.02 ADENOMA OF LEFT ADRENAL GLAND: ICD-10-CM

## 2025-02-11 PROBLEM — K57.32 DIVERTICULITIS OF COLON: Status: ACTIVE | Noted: 2025-02-11

## 2025-02-11 PROCEDURE — 74176 CT ABD & PELVIS W/O CONTRAST: CPT

## 2025-03-20 ENCOUNTER — TRANSFERRED RECORDS (OUTPATIENT)
Dept: LAB | Facility: CLINIC | Age: 75
End: 2025-03-20
Payer: MEDICARE

## 2025-03-20 NOTE — PROCEDURES
[facility]   [address], [address], [place] [address]     Patient Name: Vincenzo Moss  Gender:   Male  Exam Date: 03/20/2025 Visit Number:   [id number]  Age: 74 Years 9 Months YOB: 1950  Attending MD: Arley Cantor MD Medical Record#:   [id number]  -----------------------------------------------------------------------------------------------------------------------------   Procedure: Colonoscopy   Indications: Colorectal cancer screening  Referring MD: Stephon Dai MD  Primary MD:      Stephon Dai MD   Medications: Admitting Medications:   0.9% Normal Saline at TKO   Intra Procedure Medications:   Patient received monitored anesthesia care.     Complications: No immediate complications  ______________________________________________________________________________  Procedure:   An examination of the heart and lungs was performed and found to be within acceptable limits.  The patient was therefore deemed a reasonable candidate for endoscopy and monitored anesthesia care.   The risks, benefits and plan of the procedure were discussed with the patient and/or patient representative and all questions were answered.  After obtaining informed consent, the patient received monitored anesthesia care and I passed the scope   without difficulty via the rectum to the cecum.  The appendiceal orifice and ic valve were identified.  The quality of the prep was good  (Miralax/Gatorade/2 tablets Bisacodyl/Magnesium Citrate).    This was a complete examination throughout the entire colon.    Findings:  Diverticulosis.  Location: - sigmoid.    Description:  mild.    Size:  medium.    Quantity:  several.  Hemorrhoids.  Internal hemorrhoids without bleeding.    Impression:  1.  Diverticulosis of sigmoid colon.  2.  Internal hemorrhoids.  3.  Otherwise unremarkable colonoscopy exam.    Plan:  1.  High fiber diet.  2.  Routine repeat surveillance colonoscopy not recommended  given advanced age.        Electronically signed by:___________________  Arley Cantor MD                 03/20/2025    Medications:  Medication Dose Sig Description PRN Status PRN Reason Comments   atorvastatin 20 mg tablet 20 mg take 1 tablet by oral route  every day N  taking as directed   hydrochlorothiazide 25 mg tablet 25 mg take 1 tablet by oral route  every day N  taking as directed   losartan 100 mg tablet 100 mg take 1 tablet by oral route  every day N  taking as directed   metoprolol succinate ER 25 mg tablet,extended release 24 hr 25 mg take 1 tablet by oral route  every day N  taking as directed     Allergies:  Medication Name Ingredient Reaction Comment    NO KNOWN ALLERGIES       Vital Signs:  Date Time Systolic Diastolic Height Weight BMI   03/20/2025 644  83 70 in 225.99 32.40     Race:  White  Ethnicity:  Not  or   Preferred Language:  English      cc: Stephon Dai MD  cc: Stephon Dai MD        [facility] [phone]

## 2025-04-02 NOTE — PROGRESS NOTES
INITIAL NEUROLOGY CONSULTATION    DATE OF VISIT: 4/3/2025  CLINIC LOCATION: [facility]  MRN: [id number]  PATIENT NAME: Vincenzo Moss  YOB: 1950    REASON FOR VISIT: No chief complaint on file.    HISTORY OF PRESENT ILLNESS:                                                    Mr. Vincenzo Moss is 74 year old right handed male patient with past medical history of aortic dissection, status postrepair, anemia, obesity, hypertension, and BPH, who was seen today for transient left hand weakness.    Per patient's report, symptoms started *** ago.  He had 2 transient episodes of left hand paralysis.  Was unable to move his left hand/fingers.  Both time it happened in the evening while sitting down.  Denies any additional focal neurological symptoms.  Had head injury when he was 8 years old that required cranial surgery.  Family history is positive for Parkinson's disease.    Laboratory evaluation from January 2025 demonstrated unremarkable CMP, except slightly elevated glucose of 105, normal LDL (62), and low platelet count of 129 on CBC.  His hemoglobin A1C was 5.3 in November 2023.    Head CT from 2/13/2018 was normal.  Head and neck CTA from 6/25/2020 was negative.  Images were personally reviewed and independently interpreted.    No additional useful information is available in Care Everywhere, which was reviewed.  PAST MEDICAL/SURGICAL HISTORY:                                                    I personally reviewed patient's past medical and surgical history with the patient at today's visit.  MEDICATIONS:                                                    I personally reviewed patient's medications and allergies with the patient at today's visit.  ALLERGIES:                                                    No Known Allergies  EXAM:                                                    VITAL SIGNS:   There were no vitals taken for this visit.  Mini-Cog Assessment:        General: pt is in NAD, cooperative.  Skin: normal turgor, moist mucous membranes, no lesions/rashes noticed.  HEENT: ATNC, EOMI, PERRL, white sclera, normal conjunctiva, no nystagmus or ptosis. No carotid bruits bilaterally.  Respiratory: lung sounds clear to auscultation bilaterally, no crackles, wheezes, rhonchi. Symmetric lung excursion, no accessory respiratory muscle use.  Cardiovascular: normal S1/S2, no murmurs/rubs/gallops.   Abdomen: Not distended.  : deferred.    Neurological:  Mental: alert, follows commands,  /5 with ***/3 on memory recall, no aphasia or dysarthria. Fund of knowledge is {MYAPPROPRIATE:[id number]}  Cranial Nerves:  CN II: visual acuity - able to accurately count fingers with each eye. Visual fields intact, fundi: discs sharp, no papilledema and normal vessels bilaterally.  CN III, IV, VI: EOM intact, pupils equal and reactive  CN V: facial sensation nl  CN VII: face symmetric, no facial droop  CN VIII: hearing normal  CN IX: palate elevation symmetric, uvula at midline  CN XI SCM normal, shoulder shrug nl  CN XII: tongue midline  Motor: Strength: 5/5 in all major groups of all extremities. Normal tone. No abnormal movements. No pronator drift b/l.  Reflexes: Triceps, biceps, brachioradialis, patellar, and achilles reflexes normal and symmetric. No clonus noted. Toes are down-going b/l.   Sensory: light touch, pinprick, and vibration intact. Romberg: negative.  Coordination: FNF and heel-shin tests intact b/l.   Gait:  Normal, able to tandem walk *** without difficulty.  DATA:   LABS/EEG/IMAGING/OTHER STUDIES: I reviewed pertinent medical records, as detailed in the history of present illness.  ASSESSMENT AND PLAN:      ASSESSMENT: Vincenzo Moss is a 74 year old male patient with listed above past medical history, who presents with ***.    We had a detailed discussion with the patient regarding his presenting complaints.  The neurological exam today is ***.    DIAGNOSES:  No  diagnosis found.  PLAN: There are no Patient Instructions on file for this visit.    Total Time: *** minutes spent on the date of the encounter doing chart review, history and exam, documentation and further activities per the note.    Josh Mancuso MD  [facility]  (Chart documentation was completed in part with Dragon voice-recognition software. Even though reviewed, some grammatical, spelling, and word errors may remain.)

## 2025-04-03 ENCOUNTER — LAB (OUTPATIENT)
Dept: LAB | Facility: CLINIC | Age: 75
End: 2025-04-03
Payer: MEDICARE

## 2025-04-03 ENCOUNTER — OFFICE VISIT (OUTPATIENT)
Dept: NEUROLOGY | Facility: CLINIC | Age: 75
End: 2025-04-03
Payer: MEDICARE

## 2025-04-03 VITALS
OXYGEN SATURATION: 98 % | BODY MASS INDEX: 33.43 KG/M2 | WEIGHT: 233 LBS | HEART RATE: 75 BPM | SYSTOLIC BLOOD PRESSURE: 151 MMHG | DIASTOLIC BLOOD PRESSURE: 79 MMHG

## 2025-04-03 DIAGNOSIS — R29.818 TRANSIENT NEUROLOGICAL SYMPTOMS: Primary | ICD-10-CM

## 2025-04-03 DIAGNOSIS — R29.818 TRANSIENT NEUROLOGICAL SYMPTOMS: ICD-10-CM

## 2025-04-03 DIAGNOSIS — R79.9 ABNORMAL FINDING OF BLOOD CHEMISTRY, UNSPECIFIED: ICD-10-CM

## 2025-04-03 LAB
EST. AVERAGE GLUCOSE BLD GHB EST-MCNC: 100 MG/DL
HBA1C MFR BLD: 5.1 % (ref 0–5.6)

## 2025-04-03 NOTE — PATIENT INSTRUCTIONS
AFTER VISIT SUMMARY (AVS):    At today's visit we thoroughly discussed current symptoms, needed evaluation, symptoms and signs of stroke, stroke prevention measures, and the plan.    Will pursue brain MRI, head and neck MRA, heart monitoring for 2 weeks, and lab test (hemoglobin A1C).    Symptoms and signs of stroke were discussed.  You should call 911 with any SUDDEN onset of weakness, vision loss, speech problems, numbness, or severe headache of unknown cause.    For stroke prevention, you should:  -Continue aspirin and atorvastatin with LDL goals as listed below  -Long-term blood pressure goal is less than 130/85  -Long-term LDL goal is 40-70  -Long-term goal of hemoglobin A1C is less than 7.0  -Low-salt low-fat diet  -Regular aerobic exercise 30 minutes 3-4 times per week    Next follow-up appointment is in the next 8-12 weeks or earlier if needed.    Please do not hesitate to call me with any questions or concerns.    Thanks.

## 2025-04-03 NOTE — LETTER
4/3/2025      Vincenzo Moss  [address] [place] [id number]      Dear Colleague,    Thank you for referring your patient, Vincenzo Moss, to the [facility] NEUROLOG[facility]. Please see a copy of my visit note below.    INITIAL NEUROLOGY CONSULTATION    DATE OF VISIT: 4/3/2025  CLINIC LOCATION: [facility]  MRN: [id number]  PATIENT NAME: Vincenzo Moss  YOB: 1950    REASON FOR VISIT:   Chief Complaint   Patient presents with     Consult     Sitting and then could do anything from his wrist down for about 30-60 seconds - two separate episodes     HISTORY OF PRESENT ILLNESS:                                                    Mr. Vincenzo Moss is 74 year old right handed male patient with past medical history of aortic dissection, status postrepair, anemia, obesity, hypertension, and BPH, who was seen today for transient left hand weakness.    Per patient's report, symptoms started approximately 4 months ago.  He had 2 transient episodes of left hand paralysis.  Was unable to move his left hand/fingers for approximately 1 to 2 minutes.  No sensory symptoms.  No other focal neurological symptoms at that time.  The last episode was approximately 2 months ago.  Both times it happened in the evening while sitting down.  Denies any additional focal neurological symptoms.  Had head injury when he was 8 years old that required cranial surgery.  Family history is positive for Parkinson's disease.    Laboratory evaluation from January 2025 demonstrated unremarkable CMP, except slightly elevated glucose of 105, normal LDL (62), and low platelet count of 129 on CBC.  His hemoglobin A1C was 5.3 in November 2023.    Head CT from 2/13/2018 was normal.  Head and neck CTA from 6/25/2020 was negative.  Images were personally reviewed and independently interpreted.    Echocardiogram from 1/2/2024 was negative for atrial shunt, but demonstrated ascending aorta graft  repair.    No additional useful information is available in Care Everywhere, which was reviewed.  PAST MEDICAL/SURGICAL HISTORY:                                                    I personally reviewed patient's past medical and surgical history with the patient at today's visit.  MEDICATIONS:                                                    I personally reviewed patient's medications and allergies with the patient at today's visit.  ALLERGIES:                                                    No Known Allergies  EXAM:                                                    VITAL SIGNS:   BP (!) 151/79 (BP Location: Right arm, Patient Position: Sitting, Cuff Size: Adult Large)   Pulse 75   Wt 105.7 kg (233 lb)   SpO2 98%   BMI 33.43 kg/m    Mini-Cog Assessment:  Mini Cog Assessment  Clock Draw Score: 2 Normal  3 Item Recall: 3 objects recalled  Mini Cog Total Score: 5  Administered by: : ORTEGA Ash    General: pt is in NAD, cooperative.  Skin: normal turgor, moist mucous membranes, no lesions/rashes noticed.  HEENT: ATNC, EOMI, PERRL, white sclera, normal conjunctiva, no nystagmus or ptosis. No carotid bruits bilaterally.  Respiratory: lung sounds clear to auscultation bilaterally, no crackles, wheezes, rhonchi. Symmetric lung excursion, no accessory respiratory muscle use.  Cardiovascular: normal S1/S2, no murmurs/rubs/gallops.   Abdomen: Not distended.  : deferred.    Neurological:  Mental: alert, follows commands, Mini Cog Total Score: 5/5 with 3/3 on memory recall, no aphasia or dysarthria. Fund of knowledge is appropriate for age.  Cranial Nerves:  CN II: visual acuity - able to accurately count fingers with each eye. Visual fields intact, fundi: discs sharp, no papilledema and normal vessels bilaterally.  CN III, IV, VI: EOM intact, pupils equal and reactive  CN V: facial sensation nl  CN VII: face symmetric, no facial droop  CN VIII: hearing normal  CN IX: palate elevation symmetric, uvula at  midline  CN XI SCM normal, shoulder shrug nl  CN XII: tongue midline  Motor: Strength: 5/5 in all major groups of all extremities. Normal tone. No abnormal movements. No pronator drift b/l.  Reflexes: Triceps, biceps, brachioradialis, patellar, and achilles reflexes normal and symmetric. No clonus noted. Toes are down-going b/l.   Sensory: light touch, pinprick, and vibration intact. Romberg: negative.  Coordination: FNF and heel-shin tests intact b/l.   Gait:  Normal, able to tandem walk with mild difficulty.  DATA:   LABS/EEG/IMAGING/OTHER STUDIES: I reviewed pertinent medical records, as detailed in the history of present illness.  ASSESSMENT AND PLAN:      ASSESSMENT: Vincenzo Moss is a 74 year old male patient with listed above past medical history, who presents with 2 transient episodes of left hand weakness.    We had a detailed discussion with the patient regarding his presenting complaints.  The neurological exam today is non-focal.  We reviewed his prior brain/vessel imaging, which was unrevealing, but performed a long time ago.  He had recent LDL of 62 (no need to repeat).  Hemoglobin A1C was normal in November 2023.  Echocardiogram was negative for atrial shunt in January 2024.    I discussed with the patient that clinical presentation is concerning for possibility of TIA.  Additional differential include compression nerve injury, but complete inability to move his hand and absence of sensory symptoms would be unusual for it.      We decided to pursue TIA workup, including brain MRI, head and neck MRA, 14-day heart monitoring, and recheck his hemoglobin A1C.  No need to repeat LDL or TTE.    Meanwhile, I advised the patient to continue atorvastatin and aspirin without changes.    DIAGNOSES:    ICD-10-CM    1. Transient neurological symptoms  R29.818 Adult Neurology  Referral     MR Brain w/o Contrast     MRA Head w/o Contrast Angiogram     ZIO PATCH MAIL OUT     Hemoglobin A1c     MR  Angiogram Neck w/o Contrast      2. Abnormal finding of blood chemistry, unspecified  R79.9 Hemoglobin A1c        PLAN: At today's visit we thoroughly discussed current symptoms, needed evaluation, symptoms and signs of stroke, stroke prevention measures, and the plan.    Will pursue brain MRI, head and neck MRA, heart monitoring for 2 weeks, and lab test (hemoglobin A1C).    Symptoms and signs of stroke were discussed.  The patient was advised to call 911 with any SUDDEN onset of weakness, vision loss, speech problems, numbness, or severe headache of unknown cause.    For stroke prevention, I recommended to:  -Continue aspirin and atorvastatin with LDL goals as listed below  -Long-term blood pressure goal is less than 130/85  -Long-term LDL goal is 40-70  -Long-term goal of hemoglobin A1C is less than 7.0  -Low-salt low-fat diet  -Regular aerobic exercise 30 minutes 3-4 times per week    Next follow-up appointment is in the next 8-12 weeks or earlier if needed.    Total Time: 69 minutes spent on the date of the encounter doing chart review, history and exam, documentation and further activities per the note.    Josh Mancuso MD  [facility] Neurology  (Chart documentation was completed in part with Dragon voice-recognition software. Even though reviewed, some grammatical, spelling, and word errors may remain.)            Again, thank you for allowing me to participate in the care of your patient.        Sincerely,        Josh Mancuso MD    Electronically signed

## 2025-04-03 NOTE — PROGRESS NOTES
INITIAL NEUROLOGY CONSULTATION    DATE OF VISIT: 4/3/2025  CLINIC LOCATION: [facility]  MRN: [id number]  PATIENT NAME: Vincenzo Moss  YOB: 1950    REASON FOR VISIT:   Chief Complaint   Patient presents with    Consult     Sitting and then could do anything from his wrist down for about 30-60 seconds - two separate episodes     HISTORY OF PRESENT ILLNESS:                                                    Mr. Vincenzo Moss is 74 year old right handed male patient with past medical history of aortic dissection, status postrepair, anemia, obesity, hypertension, and BPH, who was seen today for transient left hand weakness.    Per patient's report, symptoms started approximately 4 months ago.  He had 2 transient episodes of left hand paralysis.  Was unable to move his left hand/fingers for approximately 1 to 2 minutes.  No sensory symptoms.  No other focal neurological symptoms at that time.  The last episode was approximately 2 months ago.  Both times it happened in the evening while sitting down.  Denies any additional focal neurological symptoms.  Had head injury when he was 8 years old that required cranial surgery.  Family history is positive for Parkinson's disease.    Laboratory evaluation from January 2025 demonstrated unremarkable CMP, except slightly elevated glucose of 105, normal LDL (62), and low platelet count of 129 on CBC.  His hemoglobin A1C was 5.3 in November 2023.    Head CT from 2/13/2018 was normal.  Head and neck CTA from 6/25/2020 was negative.  Images were personally reviewed and independently interpreted.    Echocardiogram from 1/2/2024 was negative for atrial shunt, but demonstrated ascending aorta graft repair.    No additional useful information is available in Care Everywhere, which was reviewed.  PAST MEDICAL/SURGICAL HISTORY:                                                    I personally reviewed patient's past medical and surgical history with  the patient at today's visit.  MEDICATIONS:                                                    I personally reviewed patient's medications and allergies with the patient at today's visit.  ALLERGIES:                                                    No Known Allergies  EXAM:                                                    VITAL SIGNS:   BP (!) 151/79 (BP Location: Right arm, Patient Position: Sitting, Cuff Size: Adult Large)   Pulse 75   Wt 105.7 kg (233 lb)   SpO2 98%   BMI 33.43 kg/m    Mini-Cog Assessment:  Mini Cog Assessment  Clock Draw Score: 2 Normal  3 Item Recall: 3 objects recalled  Mini Cog Total Score: 5  Administered by: : ORTEGA Ash    General: pt is in NAD, cooperative.  Skin: normal turgor, moist mucous membranes, no lesions/rashes noticed.  HEENT: ATNC, EOMI, PERRL, white sclera, normal conjunctiva, no nystagmus or ptosis. No carotid bruits bilaterally.  Respiratory: lung sounds clear to auscultation bilaterally, no crackles, wheezes, rhonchi. Symmetric lung excursion, no accessory respiratory muscle use.  Cardiovascular: normal S1/S2, no murmurs/rubs/gallops.   Abdomen: Not distended.  : deferred.    Neurological:  Mental: alert, follows commands, Mini Cog Total Score: 5/5 with 3/3 on memory recall, no aphasia or dysarthria. Fund of knowledge is appropriate for age.  Cranial Nerves:  CN II: visual acuity - able to accurately count fingers with each eye. Visual fields intact, fundi: discs sharp, no papilledema and normal vessels bilaterally.  CN III, IV, VI: EOM intact, pupils equal and reactive  CN V: facial sensation nl  CN VII: face symmetric, no facial droop  CN VIII: hearing normal  CN IX: palate elevation symmetric, uvula at midline  CN XI SCM normal, shoulder shrug nl  CN XII: tongue midline  Motor: Strength: 5/5 in all major groups of all extremities. Normal tone. No abnormal movements. No pronator drift b/l.  Reflexes: Triceps, biceps, brachioradialis, patellar, and achilles  reflexes normal and symmetric. No clonus noted. Toes are down-going b/l.   Sensory: light touch, pinprick, and vibration intact. Romberg: negative.  Coordination: FNF and heel-shin tests intact b/l.   Gait:  Normal, able to tandem walk with mild difficulty.  DATA:   LABS/EEG/IMAGING/OTHER STUDIES: I reviewed pertinent medical records, as detailed in the history of present illness.  ASSESSMENT AND PLAN:      ASSESSMENT: Vincenzo Moss is a 74 year old male patient with listed above past medical history, who presents with 2 transient episodes of left hand weakness.    We had a detailed discussion with the patient regarding his presenting complaints.  The neurological exam today is non-focal.  We reviewed his prior brain/vessel imaging, which was unrevealing, but performed a long time ago.  He had recent LDL of 62 (no need to repeat).  Hemoglobin A1C was normal in November 2023.  Echocardiogram was negative for atrial shunt in January 2024.    I discussed with the patient that clinical presentation is concerning for possibility of TIA.  Additional differential include compression nerve injury, but complete inability to move his hand and absence of sensory symptoms would be unusual for it.      We decided to pursue TIA workup, including brain MRI, head and neck MRA, 14-day heart monitoring, and recheck his hemoglobin A1C.  No need to repeat LDL or TTE.    Meanwhile, I advised the patient to continue atorvastatin and aspirin without changes.    DIAGNOSES:    ICD-10-CM    1. Transient neurological symptoms  R29.818 Adult Neurology  Referral     MR Brain w/o Contrast     MRA Head w/o Contrast Angiogram     ZIO PATCH MAIL OUT     Hemoglobin A1c     MR Angiogram Neck w/o Contrast      2. Abnormal finding of blood chemistry, unspecified  R79.9 Hemoglobin A1c        PLAN: At today's visit we thoroughly discussed current symptoms, needed evaluation, symptoms and signs of stroke, stroke prevention measures, and the  plan.    Will pursue brain MRI, head and neck MRA, heart monitoring for 2 weeks, and lab test (hemoglobin A1C).    Symptoms and signs of stroke were discussed.  The patient was advised to call 911 with any SUDDEN onset of weakness, vision loss, speech problems, numbness, or severe headache of unknown cause.    For stroke prevention, I recommended to:  -Continue aspirin and atorvastatin with LDL goals as listed below  -Long-term blood pressure goal is less than 130/85  -Long-term LDL goal is 40-70  -Long-term goal of hemoglobin A1C is less than 7.0  -Low-salt low-fat diet  -Regular aerobic exercise 30 minutes 3-4 times per week    Next follow-up appointment is in the next 8-12 weeks or earlier if needed.    Total Time: 69 minutes spent on the date of the encounter doing chart review, history and exam, documentation and further activities per the note.    Josh Mancuso MD  [facility] Neurology  (Chart documentation was completed in part with Dragon voice-recognition software. Even though reviewed, some grammatical, spelling, and word errors may remain.)

## 2025-04-14 ENCOUNTER — ANCILLARY PROCEDURE (OUTPATIENT)
Dept: MRI IMAGING | Facility: CLINIC | Age: 75
End: 2025-04-14
Attending: PSYCHIATRY & NEUROLOGY
Payer: MEDICARE

## 2025-04-14 DIAGNOSIS — R29.818 TRANSIENT NEUROLOGICAL SYMPTOMS: ICD-10-CM

## 2025-04-14 PROCEDURE — 70544 MR ANGIOGRAPHY HEAD W/O DYE: CPT

## 2025-04-14 PROCEDURE — 70551 MRI BRAIN STEM W/O DYE: CPT

## 2025-04-14 PROCEDURE — 70547 MR ANGIOGRAPHY NECK W/O DYE: CPT

## 2025-04-17 ENCOUNTER — ORDERS ONLY (AUTO-RELEASED) (OUTPATIENT)
Dept: NEUROLOGY | Facility: CLINIC | Age: 75
End: 2025-04-17
Payer: MEDICARE

## 2025-04-17 DIAGNOSIS — R29.818 TRANSIENT NEUROLOGICAL SYMPTOMS: ICD-10-CM

## 2025-05-12 LAB — CV ZIO PRELIM RESULTS: NORMAL

## 2025-05-19 ENCOUNTER — RESULTS FOLLOW-UP (OUTPATIENT)
Dept: NEUROLOGY | Facility: CLINIC | Age: 75
End: 2025-05-19

## 2025-06-11 NOTE — PROGRESS NOTES
ESTABLISHED PATIENT NEUROLOGY NOTE    DATE OF VISIT: 6/12/2025  CLINIC LOCATION: [facility]  MRN: [id number]  PATIENT NAME: Vincenzo Moss  YOB: 1950    REASON FOR VISIT: No chief complaint on file.    SUBJECTIVE:                                                      HISTORY OF PRESENT ILLNESS: Patient is here to follow up regarding transient left hand weakness. Please refer to my initial note from 4/3/2025 for further information.    Since the last visit, the patient reports ***.  He denies interval development of new focal neurological symptoms.    Laboratory evaluation demonstrated normal hemoglobin A1C of 5.1.    Cardiac monitoring was negative for atrial fibrillation.      Brain MRI with and without contrast from 4/14/2025 demonstrated old cortical infarct in the right parietal lobe without evidence of acute stroke in the background of scattered nonspecific T2 hyperintensities, felt to represent mild chronic small vessel ischemic disease.  Head and neck MRA from the same day were unrevealing.  Images were personally reviewed independently interpreted.  EXAM:                                                    Physical Exam:   Vitals: There were no vitals taken for this visit.    General: pt is in NAD, cooperative.  Skin: normal turgor, moist mucous membranes, no lesions/rashes noticed.  HEENT: ATNC, white sclera, normal conjunctiva.  Respiratory: Symmetric lung excursion, no accessory respiratory muscle use.  Abdomen: Non distended.  Neurological: awake, cooperative, follows commands, no exam changes compared to the initial visit.  ASSESSMENT AND PLAN:                                                    Assessment: 74 year old male patient presents for follow-up of 2 transient episodes of left hand weakness after completion of recommended workup.  We reviewed images together, including findings overall cortical stroke in the right parietal lobe.  We went over other workup  results.  He is already on aspirin and atorvastatin, which should be continued lifelong.  I do not have any additional recommendations at this time, but EEG and potentially EMG could be considered if episodes continue to recur.    Diagnoses:    ICD-10-CM    1. Transient neurological symptoms  R29.818       2. History of stroke  Z86.73         Plan:  There are no Patient Instructions on file for this visit.    Total Time: *** minutes spent on the date of the encounter doing chart review, history and exam, documentation and further activities per the note.    Josh Mancuso MD  [facility] Neurology  (Chart documentation was completed in part with Dragon voice-recognition software. Even though reviewed, some grammatical, spelling, and word errors may remain.)

## 2025-06-12 ENCOUNTER — OFFICE VISIT (OUTPATIENT)
Dept: NEUROLOGY | Facility: CLINIC | Age: 75
End: 2025-06-12
Payer: MEDICARE

## 2025-06-12 VITALS — SYSTOLIC BLOOD PRESSURE: 144 MMHG | DIASTOLIC BLOOD PRESSURE: 75 MMHG | HEART RATE: 65 BPM | OXYGEN SATURATION: 96 %

## 2025-06-12 DIAGNOSIS — Z86.73 HISTORY OF STROKE: ICD-10-CM

## 2025-06-12 DIAGNOSIS — R29.818 TRANSIENT NEUROLOGICAL SYMPTOMS: Primary | ICD-10-CM

## 2025-06-12 NOTE — PROGRESS NOTES
ESTABLISHED PATIENT NEUROLOGY NOTE    DATE OF VISIT: 6/12/2025  CLINIC LOCATION: [facility]  MRN: [id number]  PATIENT NAME: Vincenzo Moss  YOB: 1950    REASON FOR VISIT:   Chief Complaint   Patient presents with    Follow Up     Results of labs, mri and zio patch     SUBJECTIVE:                                                      HISTORY OF PRESENT ILLNESS: Patient is here to follow up regarding transient left hand weakness. Please refer to my initial note from 4/3/2025 for further information.    Since the last visit, the patient reports no changes.  He experienced milder symptoms while sitting on the couch in certain position, but they resolved after he moved his left arm.  He denies interval development of new focal neurological symptoms.    Laboratory evaluation demonstrated normal hemoglobin A1C of 5.1.    Cardiac monitoring was negative for atrial fibrillation.      Brain MRI with and without contrast from 4/14/2025 demonstrated old cortical infarct in the right parietal lobe without evidence of acute stroke in the background of scattered nonspecific T2 hyperintensities, felt to represent mild chronic small vessel ischemic disease.  Head and neck MRA from the same day were unrevealing.  Images were personally reviewed and independently interpreted.  EXAM:                                                    Physical Exam:   Vitals: BP (!) 144/75   Pulse 65   SpO2 96%     General: pt is in NAD, cooperative.  Skin: normal turgor, moist mucous membranes, no lesions/rashes noticed.  HEENT: ATNC, white sclera, normal conjunctiva.  Respiratory: Symmetric lung excursion, no accessory respiratory muscle use.  Abdomen: Non distended.  Neurological: awake, cooperative, follows commands, no exam changes compared to the initial visit.  ASSESSMENT AND PLAN:                                                    Assessment: 74 year old male patient presents for follow-up of 2 transient  episodes of left hand weakness after completion of recommended workup.      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  We went over other workup results.  He is already on aspirin and atorvastatin, which should be continued lifelong.      We discussed that cervical spine MRI and EMG could be considered to evaluate for peripheral causes, including cervical radiculopathy and potentially radial nerve involvement, but the patient wanted to hold off.  He will monitor his symptoms and change his position on the couch to see if it helps.  He will come back for additional testing if symptoms worsen in the future.    Vincenzo to follow up with Primary Care provider regarding elevated blood pressure.     Diagnoses:    ICD-10-CM    1. Transient neurological symptoms  R29.818       2. History of stroke  Z86.73         Plan: At today's visit we thoroughly discussed current symptoms, evaluation results, and the plan.    We decided to monitor patient's symptoms over time.  Advised him to come back for additional testing if they worsen in the future.    Next follow-up appointment is on as needed basis.    Total Time: 34 minutes spent on the date of the encounter doing chart review, history and exam, documentation and further activities per the note.    Josh Mancuso MD  [facility] Neurology  (Chart documentation was completed in part with Dragon voice-recognition software. Even though reviewed, some grammatical, spelling, and word errors may remain.)

## 2025-06-12 NOTE — PATIENT INSTRUCTIONS
AFTER VISIT SUMMARY (AVS):    At today's visit we thoroughly discussed current symptoms, evaluation results, and the plan.    We decided to monitor your symptoms over time.  Please come back for additional testing if they worsen in the future.    Next follow-up appointment is on as needed basis.    Please do not hesitate to call me with any questions or concerns.    Thanks.

## 2025-06-12 NOTE — LETTER
6/12/2025      Vincenzo Moss  [address] [place] [id number]      Dear Colleague,    Thank you for referring your patient, Vincenzo Moss, to the [facility]. Please see a copy of my visit note below.    ESTABLISHED PATIENT NEUROLOGY NOTE    DATE OF VISIT: 6/12/2025  CLINIC LOCATION: [facility]  MRN: [id number]  PATIENT NAME: Vincenzo Moss  YOB: 1950    REASON FOR VISIT:   Chief Complaint   Patient presents with     Follow Up     Results of labs, mri and zio patch     SUBJECTIVE:                                                      HISTORY OF PRESENT ILLNESS: Patient is here to follow up regarding transient left hand weakness. Please refer to my initial note from 4/3/2025 for further information.    Since the last visit, the patient reports no changes.  He experienced milder symptoms while sitting on the couch in certain position, but they resolved after he moved his left arm.  He denies interval development of new focal neurological symptoms.    Laboratory evaluation demonstrated normal hemoglobin A1C of 5.1.    Cardiac monitoring was negative for atrial fibrillation.      Brain MRI with and without contrast from 4/14/2025 demonstrated old cortical infarct in the right parietal lobe without evidence of acute stroke in the background of scattered nonspecific T2 hyperintensities, felt to represent mild chronic small vessel ischemic disease.  Head and neck MRA from the same day were unrevealing.  Images were personally reviewed and independently interpreted.  EXAM:                                                    Physical Exam:   Vitals: BP (!) 144/75   Pulse 65   SpO2 96%     General: pt is in NAD, cooperative.  Skin: normal turgor, moist mucous membranes, no lesions/rashes noticed.  HEENT: ATNC, white sclera, normal conjunctiva.  Respiratory: Symmetric lung excursion, no accessory respiratory muscle use.  Abdomen: Non  distended.  Neurological: awake, cooperative, follows commands, no exam changes compared to the initial visit.  ASSESSMENT AND PLAN:                                                    Assessment: 74 year old male patient presents for follow-up of 2 transient episodes of left hand weakness after completion of recommended workup.      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  We went over other workup results.  He is already on aspirin and atorvastatin, which should be continued lifelong.      We discussed that cervical spine MRI and EMG could be considered to evaluate for peripheral causes, including cervical radiculopathy and potentially radial nerve involvement, but the patient wanted to hold off.  He will monitor his symptoms and change his position on the couch to see if it helps.  He will come back for additional testing if symptoms worsen in the future.    Vincenzo to follow up with Primary Care provider regarding elevated blood pressure.     Diagnoses:    ICD-10-CM    1. Transient neurological symptoms  R29.818       2. History of stroke  Z86.73         Plan: At today's visit we thoroughly discussed current symptoms, evaluation results, and the plan.    We decided to monitor patient's symptoms over time.  Advised him to come back for additional testing if they worsen in the future.    Next follow-up appointment is on as needed basis.    Total Time: 34 minutes spent on the date of the encounter doing chart review, history and exam, documentation and further activities per the note.    Josh Mancuso MD  [facility]  (Chart documentation was completed in part with Dragon voice-recognition software. Even though reviewed, some grammatical,  spelling, and word errors may remain.)    Again, thank you for allowing me to participate in the care of your patient.        Sincerely,        Josh Mancuso MD    Electronically signed

## 2025-06-14 ENCOUNTER — HEALTH MAINTENANCE LETTER (OUTPATIENT)
Age: 75
End: 2025-06-14

## (undated) DEVICE — SU PROLENE 3-0 SHDA 48" 8534H

## (undated) DEVICE — CANNULA PERFUSION CORONARY ARTERY OSTIAL 12FR 6" SS 30012

## (undated) DEVICE — CONNECTOR PERFUSION Y 3/8X3/8" W/O LL 6031

## (undated) DEVICE — SU ETHIBOND 3-0 BBDA 36" X588H

## (undated) DEVICE — SU VICRYL 0 CT-2 27" J334H

## (undated) DEVICE — VESSEL LOOPS RED MAXI

## (undated) DEVICE — SU STEEL 6 CCS 4X18" M654G

## (undated) DEVICE — SUCTION CATH 18FR ORAL TRI-FLO T62

## (undated) DEVICE — GLOVE PROTEXIS POWDER FREE 7.5 ORTHOPEDIC 2D73ET75

## (undated) DEVICE — LINEN TOWEL PACK X30 5481

## (undated) DEVICE — SU PLEDGET SOFT TFE 13MMX7MMX1.5MM D7044

## (undated) DEVICE — CONNECTOR DRAIN CHEST Y EXTENSION SET 19909

## (undated) DEVICE — SUCTION CANISTER MEDIVAC LINER 3000ML W/LID 65651-530

## (undated) DEVICE — DRAIN CHEST TUBE 32FR STR 8032

## (undated) DEVICE — DEVICE ASSEMBLY SUCTION/ANTI COAG BTC93

## (undated) DEVICE — SU PROLENE 3-0 SHDA 36" 8522H

## (undated) DEVICE — PEN MARKING SKIN W/LABELS 31145884

## (undated) DEVICE — CANNULA PERFUSION VENOUS 32FR 12-15" SGL STAGE STR 66132

## (undated) DEVICE — SU ETHIBOND 2-0 V-5DA 10X30" PXX52

## (undated) DEVICE — PACK TUBING MINI VAC CUSTOM 3/8X3/8T BB7V94R1

## (undated) DEVICE — SUCTION TIP YANKAUER W/O VENT K86

## (undated) DEVICE — RX SURGIFLO HEMOSTATIC MATRIX W/THROMBIN 8ML NEXTGEN 2993

## (undated) DEVICE — LINEN TOWEL PACK X6 WHITE 5487

## (undated) DEVICE — Device

## (undated) DEVICE — SU ETHIBOND 0 CT-1 CR 8X18" CX21D

## (undated) DEVICE — PROTECTOR ARM ONE-STEP TRENDELENBURG 40418

## (undated) DEVICE — SU PROLENE 5-0 RB-2DA 30" 8710H

## (undated) DEVICE — SU VICRYL 3-0 FS-1 27" J442H

## (undated) DEVICE — ADH BIOGLUE CARTRIDGE 10ML BG3510-5-US

## (undated) DEVICE — SU PROLENE 4-0 SHDA 36" 8521H

## (undated) DEVICE — CABLE MYO/LEAD PACING WHITE DISP 019-530

## (undated) DEVICE — COVER TABLE POLY 65X90" 8186

## (undated) DEVICE — TOURNIQUET VASCULAR

## (undated) DEVICE — TUBING PERFUSION 1/4X1/16X8FT

## (undated) DEVICE — SPONGE RAY-TEC 4X8" 7318

## (undated) DEVICE — SU SILK 1 TIE 10X30" SA87G

## (undated) DEVICE — DRAIN SUMP INTRACARDIAC 20FR 12" POOL TIP 12112

## (undated) DEVICE — RESERVOIR CELL SAVING BLOOD COLLECTION EL2120

## (undated) DEVICE — VALVE VRV 9156R2

## (undated) DEVICE — PACK OPEN HEART PV12OH524

## (undated) DEVICE — CANNULA PERFUSION 14FRX16" LEFT 12016

## (undated) DEVICE — PACK MINI VAC CUSTOM CARDOPULMONARY BB5Z97R15

## (undated) DEVICE — KIT VASC DILATOR ESTECH 200-120

## (undated) DEVICE — DRAIN CHEST TUBE RIGHT ANGLED 32FR 8132

## (undated) DEVICE — LEAD PACER MYOCARDIAL BIPOLAR TEMPORARY 53CM 6495F

## (undated) DEVICE — SU VICRYL 0 CTX 36" J370H

## (undated) DEVICE — CANNULA PERFUSION AORTIC ROOT 22FR 20012

## (undated) DEVICE — SU MYOSTERNAL WIRE  046-267

## (undated) DEVICE — ESU EYE HIGH TEMP 65410-183

## (undated) DEVICE — SPONGE PEANUT

## (undated) DEVICE — SU UMBILICAL TAPE 36X.125" U12T

## (undated) DEVICE — KIT WASH CELL SAVING ATL2001

## (undated) DEVICE — SU ETHIBOND 2-0 SHDA 30" X563H

## (undated) DEVICE — SURGICEL HEMOSTAT 2X14" 1951

## (undated) DEVICE — BONE WAX 2.5GM W31G

## (undated) DEVICE — BONE WAX OSTENE 3.5GM CT410

## (undated) DEVICE — SU VICRYL 2-0 CT-1 27" UND J259H

## (undated) DEVICE — ESU ELEC BLADE 4" COATED

## (undated) DEVICE — SU PROLENE 4-0 RB-1DA 36" 8557H

## (undated) DEVICE — SURGICEL HEMOSTAT 3X4" NUKNIT 1943

## (undated) DEVICE — GLOVE PROTEXIS W/NEU-THERA 7.5  2D73TE75

## (undated) DEVICE — SUCTION TIP YANKAUER STR K87

## (undated) DEVICE — SOL NACL 0.9% IRRIG 1000ML BOTTLE 07138-09

## (undated) DEVICE — LINEN TOWEL PACK X5 5464

## (undated) RX ORDER — NALOXONE HYDROCHLORIDE 0.4 MG/ML
INJECTION, SOLUTION INTRAMUSCULAR; INTRAVENOUS; SUBCUTANEOUS
Status: DISPENSED
Start: 2018-02-13

## (undated) RX ORDER — FENTANYL CITRATE 50 UG/ML
INJECTION, SOLUTION INTRAMUSCULAR; INTRAVENOUS
Status: DISPENSED
Start: 2018-02-13

## (undated) RX ORDER — CEFAZOLIN SODIUM 1 G/3ML
INJECTION, POWDER, FOR SOLUTION INTRAMUSCULAR; INTRAVENOUS
Status: DISPENSED
Start: 2018-02-13

## (undated) RX ORDER — FENTANYL CITRATE 0.05 MG/ML
INJECTION, SOLUTION INTRAMUSCULAR; INTRAVENOUS
Status: DISPENSED
Start: 2018-02-13

## (undated) RX ORDER — FLUMAZENIL 0.1 MG/ML
INJECTION, SOLUTION INTRAVENOUS
Status: DISPENSED
Start: 2018-02-13

## (undated) RX ORDER — ALBUMIN, HUMAN INJ 5% 5 %
SOLUTION INTRAVENOUS
Status: DISPENSED
Start: 2018-02-13

## (undated) RX ORDER — HEPARIN SODIUM 1000 [USP'U]/ML
INJECTION, SOLUTION INTRAVENOUS; SUBCUTANEOUS
Status: DISPENSED
Start: 2018-02-13

## (undated) RX ORDER — PROPOFOL 10 MG/ML
INJECTION, EMULSION INTRAVENOUS
Status: DISPENSED
Start: 2018-02-13

## (undated) RX ORDER — PROTAMINE SULFATE 10 MG/ML
INJECTION, SOLUTION INTRAVENOUS
Status: DISPENSED
Start: 2018-02-13

## (undated) RX ORDER — GLYCOPYRROLATE 0.2 MG/ML
INJECTION, SOLUTION INTRAMUSCULAR; INTRAVENOUS
Status: DISPENSED
Start: 2018-02-13